# Patient Record
Sex: MALE | Race: BLACK OR AFRICAN AMERICAN | NOT HISPANIC OR LATINO | Employment: FULL TIME | URBAN - METROPOLITAN AREA
[De-identification: names, ages, dates, MRNs, and addresses within clinical notes are randomized per-mention and may not be internally consistent; named-entity substitution may affect disease eponyms.]

---

## 2017-01-03 ENCOUNTER — ALLSCRIPTS OFFICE VISIT (OUTPATIENT)
Dept: OTHER | Facility: OTHER | Age: 52
End: 2017-01-03

## 2017-02-10 ENCOUNTER — TRANSCRIBE ORDERS (OUTPATIENT)
Dept: URGENT CARE | Age: 52
End: 2017-02-10

## 2017-02-10 ENCOUNTER — HOSPITAL ENCOUNTER (OUTPATIENT)
Dept: RADIOLOGY | Age: 52
Discharge: HOME | End: 2017-02-10
Admitting: FAMILY MEDICINE
Payer: COMMERCIAL

## 2017-02-10 ENCOUNTER — OFFICE VISIT (OUTPATIENT)
Dept: URGENT CARE | Age: 52
End: 2017-02-10
Payer: COMMERCIAL

## 2017-02-10 DIAGNOSIS — M25.512 PAIN IN LEFT SHOULDER: ICD-10-CM

## 2017-02-10 PROCEDURE — 73030 X-RAY EXAM OF SHOULDER: CPT

## 2017-02-10 PROCEDURE — G0382 LEV 3 HOSP TYPE B ED VISIT: HCPCS | Performed by: FAMILY MEDICINE

## 2017-06-13 ENCOUNTER — ALLSCRIPTS OFFICE VISIT (OUTPATIENT)
Dept: OTHER | Facility: OTHER | Age: 52
End: 2017-06-13

## 2018-01-06 ENCOUNTER — OFFICE VISIT (OUTPATIENT)
Dept: URGENT CARE | Age: 53
End: 2018-01-06
Payer: COMMERCIAL

## 2018-01-06 PROCEDURE — 99213 OFFICE O/P EST LOW 20 MIN: CPT | Performed by: FAMILY MEDICINE

## 2018-01-06 PROCEDURE — 69209 REMOVE IMPACTED EAR WAX UNI: CPT

## 2018-01-14 VITALS
BODY MASS INDEX: 27.74 KG/M2 | WEIGHT: 183 LBS | SYSTOLIC BLOOD PRESSURE: 122 MMHG | HEART RATE: 82 BPM | DIASTOLIC BLOOD PRESSURE: 78 MMHG | RESPIRATION RATE: 16 BRPM | TEMPERATURE: 95.6 F | HEIGHT: 68 IN

## 2018-01-14 VITALS
HEIGHT: 69 IN | HEART RATE: 74 BPM | TEMPERATURE: 97 F | DIASTOLIC BLOOD PRESSURE: 80 MMHG | WEIGHT: 179 LBS | RESPIRATION RATE: 16 BRPM | SYSTOLIC BLOOD PRESSURE: 140 MMHG | BODY MASS INDEX: 26.51 KG/M2

## 2018-01-15 NOTE — RESULT NOTES
Message   pt needs appt for full physical, labs are back, PSA is elevated     Verified Results  (1) CBC/PLT/DIFF 01Apr2016 07:04AM Hue Zhou     Test Name Result Flag Reference   WBC 4 0 x10E3/uL  3 4-10 8   RBC 4 97 x10E6/uL  4 14-5 80   Hemoglobin 15 1 g/dL  12 6-17 7   Hematocrit 43 8 %  37 5-51 0   MCV 88 fL  79-97   MCH 30 4 pg  26 6-33 0   MCHC 34 5 g/dL  31 5-35 7   RDW 13 1 %  12 3-15 4   Platelets 190 Q54C0/XX  150-379   Neutrophils 32 %     Lymphs 57 %     Monocytes 6 %     Eos 4 %     Basos 1 %     Neutrophils (Absolute) 1 3 x10E3/uL L 1 4-7 0   Lymphs (Absolute) 2 3 x10E3/uL  0 7-3 1   Monocytes(Absolute) 0 2 x10E3/uL  0 1-0 9   Eos (Absolute) 0 2 x10E3/uL  0 0-0 4   Baso (Absolute) 0 0 x10E3/uL  0 0-0 2   Immature Granulocytes 0 %     Immature Grans (Abs) 0 0 x10E3/uL  0 0-0 1     (1) COMPREHENSIVE METABOLIC PANEL [id number] 40:91MN [employer]     Test Name Result Flag Reference   Glucose, Serum 98 mg/dL  65-99   BUN 12 mg/dL  6-24   Creatinine, Serum 1 08 mg/dL  0 76-1 27   eGFR If NonAfricn Am 79 mL/min/1 73  >59   eGFR If Africn Am 91 mL/min/1 73  >59   BUN/Creatinine Ratio 11  9-20   Sodium, Serum 139 mmol/L  134-144   Potassium, Serum 4 1 mmol/L  3 5-5 2   Chloride, Serum 99 mmol/L     Carbon Dioxide, Total 24 mmol/L  18-29   Calcium, Serum 9 3 mg/dL  8 7-10 2   Protein, Total, Serum 6 9 g/dL  6 0-8 5   Albumin, Serum 4 4 g/dL  3 5-5 5   Globulin, Total 2 5 g/dL  1 5-4 5   A/G Ratio 1 8  1 1-2 5   Bilirubin, Total 0 8 mg/dL  0 0-1 2   Alkaline Phosphatase, S 42 IU/L     AST (SGOT) 27 IU/L  0-40   ALT (SGPT) 30 IU/L  0-44     (1) LIPID PANEL, FASTING 01Apr2016 07:04AM Hue Epperson     Test Name Result Flag Reference   Cholesterol, Total 196 mg/dL  100-199   Triglycerides 142 mg/dL  0-149   HDL Cholesterol 45 mg/dL  >39   According to ATP-III Guidelines, HDL-C >59 mg/dL is considered a  negative risk factor for CHD     VLDL Cholesterol Sal 28 mg/dL  5-40   LDL Cholesterol Calc 123 mg/dL H 0-99   T  Chol/HDL Ratio 4 4 ratio units  0 0-5 0   T  Chol/HDL Ratio                                                             Men  Women                                               1/2 Avg  Risk  3 4    3 3                                                   Avg Risk  5 0    4 4                                                2X Avg  Risk  9 6    7 1                                                3X Avg  Risk 23 4   11 0     (1) PSA (SCREEN) (Dx V76 44 Screen for Prostate Cancer) 01Apr2016 07:04AM Lanette Wheeler     Test Name Result Flag Reference   Prostate Specific Ag, Serum 4 2 ng/mL H 0 0-4 0   Roche ECLIA methodology  According to the American Urological Association, Serum PSA should  decrease and remain at undetectable levels after radical  prostatectomy  The AUA defines biochemical recurrence as an initial  PSA value 0 2 ng/mL or greater followed by a subsequent confirmatory  PSA value 0 2 ng/mL or greater  Values obtained with different assay methods or kits cannot be used  interchangeably  Results cannot be interpreted as absolute evidence  of the presence or absence of malignant disease  (1) TSH 01Apr2016 07:04AM Lanette Wheeler     Test Name Result Flag Reference   TSH 1 380 uIU/mL  0 450-4 500     [facility]) Cardiovascular Risk Assessment 01Apr2016 07:04AM Silvino Mcnamara courtesy copy of this report has been sent to  the patient  Test Name Result Flag Reference   Interpretation Note     Supplement report is available  PDF Image

## 2018-01-23 VITALS
OXYGEN SATURATION: 100 % | BODY MASS INDEX: 27.28 KG/M2 | HEART RATE: 61 BPM | WEIGHT: 180 LBS | RESPIRATION RATE: 18 BRPM | HEIGHT: 68 IN | DIASTOLIC BLOOD PRESSURE: 75 MMHG | SYSTOLIC BLOOD PRESSURE: 134 MMHG | TEMPERATURE: 97.3 F
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
## 2018-05-14 ENCOUNTER — OFFICE VISIT (OUTPATIENT)
Dept: FAMILY MEDICINE CLINIC | Facility: CLINIC | Age: 53
End: 2018-05-14
Payer: COMMERCIAL

## 2018-05-14 VITALS
HEIGHT: 68 IN | BODY MASS INDEX: 27.58 KG/M2 | SYSTOLIC BLOOD PRESSURE: 116 MMHG | DIASTOLIC BLOOD PRESSURE: 74 MMHG | HEART RATE: 72 BPM | WEIGHT: 182 LBS | RESPIRATION RATE: 16 BRPM | TEMPERATURE: 95.9 F

## 2018-05-14 DIAGNOSIS — Z12.5 SCREENING FOR PROSTATE CANCER: ICD-10-CM

## 2018-05-14 DIAGNOSIS — Z13.6 SCREENING FOR CARDIOVASCULAR CONDITION: ICD-10-CM

## 2018-05-14 DIAGNOSIS — J02.8 PHARYNGITIS DUE TO OTHER ORGANISM: Primary | ICD-10-CM

## 2018-05-14 DIAGNOSIS — Z12.11 SCREENING FOR COLON CANCER: ICD-10-CM

## 2018-05-14 DIAGNOSIS — Z12.11 SCREEN FOR COLON CANCER: ICD-10-CM

## 2018-05-14 PROCEDURE — 99213 OFFICE O/P EST LOW 20 MIN: CPT | Performed by: FAMILY MEDICINE

## 2018-05-14 PROCEDURE — 3008F BODY MASS INDEX DOCD: CPT | Performed by: FAMILY MEDICINE

## 2018-05-14 PROCEDURE — 1036F TOBACCO NON-USER: CPT | Performed by: FAMILY MEDICINE

## 2018-05-14 RX ORDER — AZITHROMYCIN 250 MG/1
TABLET, FILM COATED ORAL
Qty: 6 TABLET | Refills: 0 | Status: SHIPPED | OUTPATIENT
Start: 2018-05-14 | End: 2018-05-19

## 2018-05-14 RX ORDER — ALPRAZOLAM 0.25 MG/1
TABLET ORAL
Refills: 0 | COMMUNITY
Start: 2018-05-05 | End: 2018-05-14 | Stop reason: ALTCHOICE

## 2018-05-14 NOTE — PROGRESS NOTES
Assessment/Plan:    Problem List Items Addressed This Visit     None      Visit Diagnoses     Pharyngitis due to other organism    -  Primary    Relevant Medications    azithromycin (ZITHROMAX) 250 mg tablet    Screening for cardiovascular condition        Relevant Orders    Comprehensive metabolic panel    Lipid Panel with Direct LDL reflex    Screening for prostate cancer        Relevant Orders    PSA, ultrasensitive    Screen for colon cancer        Relevant Orders    Ambulatory referral to Gastroenterology    Screening for colon cancer        Relevant Orders    Ambulatory referral to Gastroenterology          There are no Patient Instructions on file for this visit  Return for Annual physical     Subjective:      Patient ID: Deena Paulino is a 47 y o  male  Chief Complaint   Patient presents with    Cold Like Symptoms     lj    Cough    Sore Throat       Pt states he has a sore throat some HA  Pt states his left ear hurts states three days ago he started using an abx he was given by an ent in June - its a drop and he used it for three days /  Has helped    Pt states he would like to have labs for a follow up      Cough   Associated symptoms include chills, ear congestion, ear pain, headaches, myalgias, nasal congestion, postnasal drip and a sore throat  Pertinent negatives include no chest pain, fever, heartburn, hemoptysis, rash, rhinorrhea, shortness of breath, sweats, weight loss or wheezing  Sore Throat    Associated symptoms include coughing, ear pain and headaches  Pertinent negatives include no shortness of breath  The following portions of the patient's history were reviewed and updated as appropriate: allergies, current medications, past family history, past medical history, past social history, past surgical history and problem list     Review of Systems   Constitutional: Positive for chills  Negative for fever and weight loss     HENT: Positive for ear pain, postnasal drip and sore throat  Negative for rhinorrhea  Respiratory: Positive for cough  Negative for hemoptysis, shortness of breath and wheezing  Cardiovascular: Negative for chest pain  Gastrointestinal: Negative for heartburn  Musculoskeletal: Positive for myalgias  Skin: Negative for rash  Neurological: Positive for headaches  Current Outpatient Prescriptions   Medication Sig Dispense Refill    azithromycin (ZITHROMAX) 250 mg tablet 2 tabs on day 1, 1 tab a day for 4 days after 6 tablet 0     No current facility-administered medications for this visit  Objective:    /74   Pulse 72   Temp (!) 95 9 °F (35 5 °C)   Resp 16   Ht 5' 8" (1 727 m)   Wt 82 6 kg (182 lb)   BMI 27 67 kg/m²        Physical Exam   Constitutional: He appears well-developed and well-nourished  HENT:   Head: Normocephalic and atraumatic  Right Ear: Tympanic membrane is erythematous and bulging  Left Ear: Tympanic membrane is not bulging  Nose: Mucosal edema present  Mouth/Throat: Posterior oropharyngeal erythema present  Eyes: Conjunctivae and EOM are normal  Pupils are equal, round, and reactive to light  Neck: Normal range of motion  Cardiovascular: Normal rate, regular rhythm and intact distal pulses  No murmur heard  Pulmonary/Chest: Effort normal and breath sounds normal    Abdominal: Soft  Musculoskeletal: Normal range of motion  Lymphadenopathy:     He has no cervical adenopathy  Neurological: He is alert  Skin: He is not diaphoretic  Nursing note and vitals reviewed        Labs ordered for full physical       Isabela Ayers DO

## 2018-05-14 NOTE — LETTER
May 14, 2018     Patient: Isela Olivarez   YOB: 1965   Date of Visit: 5/14/2018       To Whom it May Concern:    Isela Olivarez is under my professional care  He was seen in my office on 5/14/2018  He may return to work on 5/15  If you have any questions or concerns, please don't hesitate to call           Sincerely,          Michaela Estes DO        CC: No Recipients

## 2018-05-21 LAB
ALBUMIN SERPL-MCNC: 4.7 G/DL (ref 3.5–5.5)
ALBUMIN/GLOB SERPL: 2 {RATIO} (ref 1.2–2.2)
ALP SERPL-CCNC: 44 IU/L (ref 39–117)
ALT SERPL-CCNC: 14 IU/L (ref 0–44)
AMBIG ABBREV DEFAULT: NORMAL
AST SERPL-CCNC: 17 IU/L (ref 0–40)
BILIRUB SERPL-MCNC: 1.1 MG/DL (ref 0–1.2)
BUN SERPL-MCNC: 14 MG/DL (ref 6–24)
BUN/CREAT SERPL: 12 (ref 9–20)
CALCIUM SERPL-MCNC: 9.5 MG/DL (ref 8.7–10.2)
CHLORIDE SERPL-SCNC: 101 MMOL/L (ref 96–106)
CHOLEST SERPL-MCNC: 187 MG/DL (ref 100–199)
CO2 SERPL-SCNC: 27 MMOL/L (ref 18–29)
CREAT SERPL-MCNC: 1.21 MG/DL (ref 0.76–1.27)
GLOBULIN SER-MCNC: 2.3 G/DL (ref 1.5–4.5)
GLUCOSE SERPL-MCNC: 104 MG/DL (ref 65–99)
HDLC SERPL-MCNC: 53 MG/DL
LDLC SERPL CALC-MCNC: 117 MG/DL (ref 0–99)
MICRODELETION SYND BLD/T FISH: NORMAL
POTASSIUM SERPL-SCNC: 4.4 MMOL/L (ref 3.5–5.2)
PROT SERPL-MCNC: 7 G/DL (ref 6–8.5)
PSA SERPL DL<=0.01 NG/ML-MCNC: 5.17 NG/ML (ref 0–4)
SL AMB EGFR AFRICAN AMERICAN: 79 ML/MIN/1.73
SL AMB EGFR NON AFRICAN AMERICAN: 68 ML/MIN/1.73
SODIUM SERPL-SCNC: 142 MMOL/L (ref 134–144)
TRIGL SERPL-MCNC: 84 MG/DL (ref 0–149)

## 2019-05-28 ENCOUNTER — OFFICE VISIT (OUTPATIENT)
Dept: FAMILY MEDICINE CLINIC | Facility: CLINIC | Age: 54
End: 2019-05-28
Payer: COMMERCIAL

## 2019-05-28 VITALS
WEIGHT: 189 LBS | HEART RATE: 64 BPM | SYSTOLIC BLOOD PRESSURE: 128 MMHG | RESPIRATION RATE: 16 BRPM | TEMPERATURE: 96.9 F | BODY MASS INDEX: 27.99 KG/M2 | DIASTOLIC BLOOD PRESSURE: 70 MMHG | HEIGHT: 69 IN

## 2019-05-28 DIAGNOSIS — Z13.6 SCREENING FOR CARDIOVASCULAR CONDITION: ICD-10-CM

## 2019-05-28 DIAGNOSIS — J30.9 ALLERGIC RHINITIS, UNSPECIFIED SEASONALITY, UNSPECIFIED TRIGGER: ICD-10-CM

## 2019-05-28 DIAGNOSIS — M25.512 CHRONIC LEFT SHOULDER PAIN: Primary | ICD-10-CM

## 2019-05-28 DIAGNOSIS — Z12.5 PROSTATE CANCER SCREENING: ICD-10-CM

## 2019-05-28 DIAGNOSIS — G89.29 CHRONIC LEFT SHOULDER PAIN: Primary | ICD-10-CM

## 2019-05-28 PROCEDURE — 99214 OFFICE O/P EST MOD 30 MIN: CPT | Performed by: NURSE PRACTITIONER

## 2019-05-28 RX ORDER — PREDNISONE 10 MG/1
TABLET ORAL
Qty: 20 TABLET | Refills: 0 | Status: SHIPPED | OUTPATIENT
Start: 2019-05-28 | End: 2019-06-07

## 2019-05-29 ENCOUNTER — APPOINTMENT (OUTPATIENT)
Dept: RADIOLOGY | Facility: CLINIC | Age: 54
End: 2019-05-29
Payer: COMMERCIAL

## 2019-05-29 ENCOUNTER — CONSULT (OUTPATIENT)
Dept: OBGYN CLINIC | Facility: CLINIC | Age: 54
End: 2019-05-29
Payer: COMMERCIAL

## 2019-05-29 VITALS
HEART RATE: 90 BPM | DIASTOLIC BLOOD PRESSURE: 80 MMHG | SYSTOLIC BLOOD PRESSURE: 133 MMHG | HEIGHT: 68 IN | BODY MASS INDEX: 28.04 KG/M2 | WEIGHT: 185 LBS

## 2019-05-29 DIAGNOSIS — M25.512 CHRONIC LEFT SHOULDER PAIN: ICD-10-CM

## 2019-05-29 DIAGNOSIS — G89.29 CHRONIC LEFT SHOULDER PAIN: ICD-10-CM

## 2019-05-29 PROCEDURE — 99203 OFFICE O/P NEW LOW 30 MIN: CPT | Performed by: ORTHOPAEDIC SURGERY

## 2019-05-29 PROCEDURE — 73030 X-RAY EXAM OF SHOULDER: CPT

## 2019-06-17 ENCOUNTER — HOSPITAL ENCOUNTER (OUTPATIENT)
Dept: RADIOLOGY | Facility: HOSPITAL | Age: 54
Discharge: HOME | End: 2019-06-17
Attending: ORTHOPAEDIC SURGERY
Payer: COMMERCIAL

## 2019-06-17 DIAGNOSIS — M25.512 CHRONIC LEFT SHOULDER PAIN: ICD-10-CM

## 2019-06-17 DIAGNOSIS — G89.29 CHRONIC LEFT SHOULDER PAIN: ICD-10-CM

## 2019-06-17 PROCEDURE — 73221 MRI JOINT UPR EXTREM W/O DYE: CPT

## 2019-06-21 ENCOUNTER — TELEPHONE (OUTPATIENT)
Dept: FAMILY MEDICINE CLINIC | Facility: CLINIC | Age: 54
End: 2019-06-21

## 2019-06-21 DIAGNOSIS — M75.102 TEAR OF LEFT SUPRASPINATUS TENDON: Primary | ICD-10-CM

## 2019-06-21 LAB
ALBUMIN SERPL-MCNC: 4.5 G/DL (ref 3.5–5.5)
ALBUMIN/GLOB SERPL: 1.9 {RATIO} (ref 1.2–2.2)
ALP SERPL-CCNC: 39 IU/L (ref 39–117)
ALT SERPL-CCNC: 13 IU/L (ref 0–44)
AST SERPL-CCNC: 16 IU/L (ref 0–40)
BILIRUB SERPL-MCNC: 0.9 MG/DL (ref 0–1.2)
BUN SERPL-MCNC: 15 MG/DL (ref 6–24)
BUN/CREAT SERPL: 12 (ref 9–20)
CALCIUM SERPL-MCNC: 9.4 MG/DL (ref 8.7–10.2)
CHLORIDE SERPL-SCNC: 102 MMOL/L (ref 96–106)
CHOLEST SERPL-MCNC: 196 MG/DL (ref 100–199)
CO2 SERPL-SCNC: 22 MMOL/L (ref 20–29)
CREAT SERPL-MCNC: 1.25 MG/DL (ref 0.76–1.27)
GLOBULIN SER-MCNC: 2.4 G/DL (ref 1.5–4.5)
GLUCOSE SERPL-MCNC: 98 MG/DL (ref 65–99)
HDLC SERPL-MCNC: 52 MG/DL
LABCORP COMMENT: NORMAL
LDLC SERPL CALC-MCNC: 129 MG/DL (ref 0–99)
MICRODELETION SYND BLD/T FISH: NORMAL
POTASSIUM SERPL-SCNC: 4 MMOL/L (ref 3.5–5.2)
PROT SERPL-MCNC: 6.9 G/DL (ref 6–8.5)
PSA SERPL-MCNC: 6.3 NG/ML (ref 0–4)
SL AMB EGFR AFRICAN AMERICAN: 75 ML/MIN/1.73
SL AMB EGFR NON AFRICAN AMERICAN: 65 ML/MIN/1.73
SODIUM SERPL-SCNC: 140 MMOL/L (ref 134–144)
TRIGL SERPL-MCNC: 75 MG/DL (ref 0–149)

## 2019-06-21 NOTE — TELEPHONE ENCOUNTER
Patient is asking if he can come August 7 for a physical he will be out of town before then  He stated he will be available next week Monday, however you are booked solid  [employer] told me that his PSA is abnormal please advise    Jacky Epley, MA

## 2019-06-21 NOTE — TELEPHONE ENCOUNTER
August 6th which is tuesday he will be coming up from [place]  It this the Tuesday you are referring to ?

## 2019-06-21 NOTE — TELEPHONE ENCOUNTER
Please call  Patient and let him know that to schedule physical  appt with dr Bina Deo to discuss his blood work as advised him on his appt   RAJENDRA Saini

## 2019-06-24 ENCOUNTER — OFFICE VISIT (OUTPATIENT)
Dept: OBGYN CLINIC | Facility: CLINIC | Age: 54
End: 2019-06-24
Payer: COMMERCIAL

## 2019-06-24 VITALS
SYSTOLIC BLOOD PRESSURE: 119 MMHG | DIASTOLIC BLOOD PRESSURE: 74 MMHG | HEART RATE: 57 BPM | BODY MASS INDEX: 27.28 KG/M2 | WEIGHT: 180 LBS | HEIGHT: 68 IN

## 2019-06-24 DIAGNOSIS — M75.102 TEAR OF LEFT SUPRASPINATUS TENDON: Primary | ICD-10-CM

## 2019-06-24 PROCEDURE — 99213 OFFICE O/P EST LOW 20 MIN: CPT | Performed by: ORTHOPAEDIC SURGERY

## 2019-06-24 PROCEDURE — 20610 DRAIN/INJ JOINT/BURSA W/O US: CPT | Performed by: ORTHOPAEDIC SURGERY

## 2019-06-24 RX ORDER — LIDOCAINE HYDROCHLORIDE 10 MG/ML
4 INJECTION, SOLUTION INFILTRATION; PERINEURAL
Status: COMPLETED | OUTPATIENT
Start: 2019-06-24 | End: 2019-06-24

## 2019-06-24 RX ORDER — DEXAMETHASONE SODIUM PHOSPHATE 100 MG/10ML
40 INJECTION INTRAMUSCULAR; INTRAVENOUS
Status: COMPLETED | OUTPATIENT
Start: 2019-06-24 | End: 2019-06-24

## 2019-06-24 RX ADMIN — LIDOCAINE HYDROCHLORIDE 4 ML: 10 INJECTION, SOLUTION INFILTRATION; PERINEURAL at 16:11

## 2019-06-24 RX ADMIN — DEXAMETHASONE SODIUM PHOSPHATE 40 MG: 100 INJECTION INTRAMUSCULAR; INTRAVENOUS at 16:11

## 2019-08-04 ENCOUNTER — APPOINTMENT (EMERGENCY)
Dept: RADIOLOGY | Facility: HOSPITAL | Age: 54
End: 2019-08-04
Payer: COMMERCIAL

## 2019-08-04 ENCOUNTER — HOSPITAL ENCOUNTER (EMERGENCY)
Facility: HOSPITAL | Age: 54
Discharge: HOME | End: 2019-08-04
Attending: EMERGENCY MEDICINE | Admitting: EMERGENCY MEDICINE
Payer: COMMERCIAL

## 2019-08-04 DIAGNOSIS — R50.9 FEVER: Primary | ICD-10-CM

## 2019-08-04 DIAGNOSIS — M79.10 MYALGIA: ICD-10-CM

## 2019-08-04 LAB
ALBUMIN SERPL BCP-MCNC: 4.1 G/DL (ref 3.5–5)
ALP SERPL-CCNC: 66 U/L (ref 46–116)
ALT SERPL W P-5'-P-CCNC: 28 U/L (ref 12–78)
ANION GAP SERPL CALCULATED.3IONS-SCNC: 9 MMOL/L (ref 4–13)
APTT PPP: 30 SECONDS (ref 23–37)
AST SERPL W P-5'-P-CCNC: 15 U/L (ref 5–45)
BASOPHILS # BLD AUTO: 0.03 THOUSANDS/ΜL (ref 0–0.1)
BASOPHILS NFR BLD AUTO: 0 % (ref 0–1)
BILIRUB SERPL-MCNC: 0.9 MG/DL (ref 0.2–1)
BILIRUB UR QL STRIP: NEGATIVE
BUN SERPL-MCNC: 11 MG/DL (ref 5–25)
CALCIUM SERPL-MCNC: 9.3 MG/DL (ref 8.3–10.1)
CHLORIDE SERPL-SCNC: 104 MMOL/L (ref 100–108)
CK SERPL-CCNC: 131 U/L (ref 39–308)
CLARITY UR: CLEAR
CO2 SERPL-SCNC: 30 MMOL/L (ref 21–32)
COLOR UR: YELLOW
CREAT SERPL-MCNC: 1.32 MG/DL (ref 0.6–1.3)
EOSINOPHIL # BLD AUTO: 0.48 THOUSAND/ΜL (ref 0–0.61)
EOSINOPHIL NFR BLD AUTO: 6 % (ref 0–6)
ERYTHROCYTE [DISTWIDTH] IN BLOOD BY AUTOMATED COUNT: 11.7 % (ref 11.6–15.1)
GFR SERPL CREATININE-BSD FRML MDRD: 70 ML/MIN/1.73SQ M
GLUCOSE SERPL-MCNC: 94 MG/DL (ref 65–140)
GLUCOSE UR STRIP-MCNC: NEGATIVE MG/DL
HCT VFR BLD AUTO: 47.7 % (ref 36.5–49.3)
HGB BLD-MCNC: 16.2 G/DL (ref 12–17)
HGB UR QL STRIP.AUTO: NEGATIVE
IMM GRANULOCYTES # BLD AUTO: 0.02 THOUSAND/UL (ref 0–0.2)
IMM GRANULOCYTES NFR BLD AUTO: 0 % (ref 0–2)
INR PPP: 1.05 (ref 0.84–1.19)
KETONES UR STRIP-MCNC: NEGATIVE MG/DL
LACTATE SERPL-SCNC: 1.4 MMOL/L (ref 0.5–2)
LEUKOCYTE ESTERASE UR QL STRIP: NEGATIVE
LYMPHOCYTES # BLD AUTO: 0.57 THOUSANDS/ΜL (ref 0.6–4.47)
LYMPHOCYTES NFR BLD AUTO: 7 % (ref 14–44)
MAGNESIUM SERPL-MCNC: 2.1 MG/DL (ref 1.6–2.6)
MCH RBC QN AUTO: 30.7 PG (ref 26.8–34.3)
MCHC RBC AUTO-ENTMCNC: 34 G/DL (ref 31.4–37.4)
MCV RBC AUTO: 90 FL (ref 82–98)
MONOCYTES # BLD AUTO: 0.49 THOUSAND/ΜL (ref 0.17–1.22)
MONOCYTES NFR BLD AUTO: 6 % (ref 4–12)
NEUTROPHILS # BLD AUTO: 6.42 THOUSANDS/ΜL (ref 1.85–7.62)
NEUTS SEG NFR BLD AUTO: 81 % (ref 43–75)
NITRITE UR QL STRIP: NEGATIVE
NRBC BLD AUTO-RTO: 0 /100 WBCS
PH UR STRIP.AUTO: 6 [PH] (ref 4.5–8)
PLATELET # BLD AUTO: 271 THOUSANDS/UL (ref 149–390)
PMV BLD AUTO: 9.3 FL (ref 8.9–12.7)
POTASSIUM SERPL-SCNC: 3.9 MMOL/L (ref 3.5–5.3)
PROT SERPL-MCNC: 7.8 G/DL (ref 6.4–8.2)
PROT UR STRIP-MCNC: NEGATIVE MG/DL
PROTHROMBIN TIME: 13.1 SECONDS (ref 11.6–14.5)
RBC # BLD AUTO: 5.28 MILLION/UL (ref 3.88–5.62)
SODIUM SERPL-SCNC: 143 MMOL/L (ref 136–145)
SP GR UR STRIP.AUTO: <=1.005 (ref 1–1.03)
TSH SERPL DL<=0.05 MIU/L-ACNC: 0.62 UIU/ML (ref 0.36–3.74)
UROBILINOGEN UR QL STRIP.AUTO: 0.2 E.U./DL
WBC # BLD AUTO: 8.01 THOUSAND/UL (ref 4.31–10.16)

## 2019-08-04 PROCEDURE — 86618 LYME DISEASE ANTIBODY: CPT | Performed by: EMERGENCY MEDICINE

## 2019-08-04 PROCEDURE — 84443 ASSAY THYROID STIM HORMONE: CPT | Performed by: EMERGENCY MEDICINE

## 2019-08-04 PROCEDURE — 83605 ASSAY OF LACTIC ACID: CPT | Performed by: EMERGENCY MEDICINE

## 2019-08-04 PROCEDURE — 85610 PROTHROMBIN TIME: CPT | Performed by: EMERGENCY MEDICINE

## 2019-08-04 PROCEDURE — 36415 COLL VENOUS BLD VENIPUNCTURE: CPT | Performed by: EMERGENCY MEDICINE

## 2019-08-04 PROCEDURE — 87177 OVA AND PARASITES SMEARS: CPT | Performed by: EMERGENCY MEDICINE

## 2019-08-04 PROCEDURE — 85730 THROMBOPLASTIN TIME PARTIAL: CPT | Performed by: EMERGENCY MEDICINE

## 2019-08-04 PROCEDURE — 82550 ASSAY OF CK (CPK): CPT | Performed by: EMERGENCY MEDICINE

## 2019-08-04 PROCEDURE — 83735 ASSAY OF MAGNESIUM: CPT | Performed by: EMERGENCY MEDICINE

## 2019-08-04 PROCEDURE — 96361 HYDRATE IV INFUSION ADD-ON: CPT

## 2019-08-04 PROCEDURE — 87207 SMEAR SPECIAL STAIN: CPT | Performed by: EMERGENCY MEDICINE

## 2019-08-04 PROCEDURE — 85025 COMPLETE CBC W/AUTO DIFF WBC: CPT | Performed by: EMERGENCY MEDICINE

## 2019-08-04 PROCEDURE — 80053 COMPREHEN METABOLIC PANEL: CPT | Performed by: EMERGENCY MEDICINE

## 2019-08-04 PROCEDURE — 87505 NFCT AGENT DETECTION GI: CPT | Performed by: EMERGENCY MEDICINE

## 2019-08-04 PROCEDURE — 96375 TX/PRO/DX INJ NEW DRUG ADDON: CPT

## 2019-08-04 PROCEDURE — 81003 URINALYSIS AUTO W/O SCOPE: CPT

## 2019-08-04 PROCEDURE — 71046 X-RAY EXAM CHEST 2 VIEWS: CPT

## 2019-08-04 PROCEDURE — 87209 SMEAR COMPLEX STAIN: CPT | Performed by: EMERGENCY MEDICINE

## 2019-08-04 PROCEDURE — 96365 THER/PROPH/DIAG IV INF INIT: CPT

## 2019-08-04 PROCEDURE — 99284 EMERGENCY DEPT VISIT MOD MDM: CPT

## 2019-08-04 PROCEDURE — 87040 BLOOD CULTURE FOR BACTERIA: CPT | Performed by: EMERGENCY MEDICINE

## 2019-08-04 PROCEDURE — 99284 EMERGENCY DEPT VISIT MOD MDM: CPT | Performed by: EMERGENCY MEDICINE

## 2019-08-04 RX ORDER — DOXYCYCLINE HYCLATE 100 MG/1
100 CAPSULE ORAL 2 TIMES DAILY
Qty: 14 CAPSULE | Refills: 0 | Status: SHIPPED | OUTPATIENT
Start: 2019-08-04 | End: 2019-08-11

## 2019-08-04 RX ORDER — ACETAMINOPHEN 325 MG/1
975 TABLET ORAL ONCE
Status: COMPLETED | OUTPATIENT
Start: 2019-08-04 | End: 2019-08-04

## 2019-08-04 RX ORDER — KETOROLAC TROMETHAMINE 30 MG/ML
15 INJECTION, SOLUTION INTRAMUSCULAR; INTRAVENOUS ONCE
Status: COMPLETED | OUTPATIENT
Start: 2019-08-04 | End: 2019-08-04

## 2019-08-04 RX ADMIN — DOXYCYCLINE 100 MG: 100 INJECTION, POWDER, LYOPHILIZED, FOR SOLUTION INTRAVENOUS at 21:01

## 2019-08-04 RX ADMIN — SODIUM CHLORIDE 1000 ML: 0.9 INJECTION, SOLUTION INTRAVENOUS at 19:13

## 2019-08-04 RX ADMIN — ACETAMINOPHEN 975 MG: 325 TABLET ORAL at 19:45

## 2019-08-04 RX ADMIN — KETOROLAC TROMETHAMINE 15 MG: 30 INJECTION, SOLUTION INTRAMUSCULAR at 19:13

## 2019-08-04 NOTE — ED PROVIDER NOTES
History  Chief Complaint   Patient presents with    Generalized Body Aches     Pt  c/o generalized body aches with diarrhea for two days  Pt  visited [place] ([place] two weeks ago and was dx with malaria  Pt  treated there before coming home  History provided by:  Patient   used: No    Generalized Body Aches   Associated symptoms: fatigue, fever and myalgias    Associated symptoms: no abdominal pain, no nausea, no shortness of breath and no vomiting     46year-old otherwise healthy male who returned from a three-week trip to [place] ([place]) 2 days ago presented with generalized body and joint aches as well as some diarrhea  States that he started feeling unwell initially 5 days into his trip with similar sx  Reports having a history of malaria, grew up in [facility] ([place])  Reports that he saw care there and was diagnosed with malaria and treated with a 3 day course of a medicine he believes was called aplax  He had been taking mefloquine once weekly as prophylaxis  Stated that after he was treated he felt better until he returned to the [place]  He does have a low-grade temperature here appears nontoxic  He states all of his joints and muscles ache  He has not taken any medication prior to arrival   No difficulty breathing, abdominal pain, nausea, vomiting, headache, neck pain  Plan labs, fluids, analgesics, re-evaluate  None       Past Medical History:   Diagnosis Date    Dermatitis     Resolved 3/31/2016     Dyshidrosis     Resolved 3/31/2016        Past Surgical History:   Procedure Laterality Date    SKIN BIOPSY         Family History   Problem Relation Age of Onset    Arthritis Mother     Deep vein thrombosis Mother     Prostate cancer Father    [facility] Other Son         Blurring vision  Vision problems due to auto-immune   Mental illness Neg Hx      I have reviewed and agree with the history as documented      Social History     Tobacco Use    Smoking status: Never Smoker    Smokeless tobacco: Never Used   Substance Use Topics    Alcohol use: Not on file    Drug use: Not on file        Review of Systems   Constitutional: Positive for activity change, fatigue and fever  Negative for appetite change  Respiratory: Negative for chest tightness and shortness of breath  Gastrointestinal: Negative for abdominal pain, nausea and vomiting  Musculoskeletal: Positive for arthralgias and myalgias  Skin: Negative for color change  Neurological: Negative for dizziness and weakness  All other systems reviewed and are negative  Physical Exam  Physical Exam   Constitutional: He is oriented to person, place, and time  He appears well-developed and well-nourished  No distress  HENT:   Head: Normocephalic  Mouth/Throat: Oropharynx is clear and moist    Cardiovascular: Normal rate and regular rhythm  Pulmonary/Chest: Effort normal    Abdominal: Soft  He exhibits no distension  There is no tenderness  Musculoskeletal: Normal range of motion  He exhibits no edema  Neurological: He is alert and oriented to person, place, and time  Skin: Skin is warm and dry  He is not diaphoretic  Psychiatric: He has a normal mood and affect  His behavior is normal    Nursing note and vitals reviewed        Vital Signs  ED Triage Vitals   Temperature Pulse Respirations Blood Pressure SpO2   08/04/19 1638 08/04/19 1638 08/04/19 1638 08/04/19 1638 08/04/19 1638   99 °F (37 2 °C) 103 20 (!) 171/80 97 %      Temp Source Heart Rate Source Patient Position - Orthostatic VS BP Location FiO2 (%)   08/04/19 1638 08/04/19 1913 08/04/19 1913 08/04/19 1913 --   Oral Monitor Sitting Left arm       Pain Score       08/04/19 1638       Worst Possible Pain           Vitals:    08/04/19 1638 08/04/19 1913 08/04/19 2108 08/04/19 2220   BP: (!) 171/80 142/67 132/69 141/61   Pulse: 103 84 92 83   Patient Position - Orthostatic VS:  Sitting Lying Lying         Visual Acuity      ED Medications  Medications   sodium chloride 0 9 % bolus 1,000 mL (0 mL Intravenous Stopped 8/4/19 2013)   ketorolac (TORADOL) injection 15 mg (15 mg Intravenous Given 8/4/19 1913)   acetaminophen (TYLENOL) tablet 975 mg (975 mg Oral Given 8/4/19 1945)   doxycycline (VIBRAMYCIN) 100 mg in sodium chloride 0 9 % 100 mL IVPB (0 mg Intravenous Stopped 8/4/19 2220)       Diagnostic Studies  Results Reviewed     Procedure Component Value Units Date/Time    Stool Enteric Bacterial Panel by PCR [[id number]]  (Normal) Collected:  08/04/19 2132    Lab Status:  Final result Specimen:  Stool from Rectum Updated:  08/05/19 1450     Salmonella sp PCR None Detected     Shigella sp/Enteroinvasive E  coli (EIEC) PCR None Detected     Campylobacter sp (jejuni and coli) PCR None Detected     Shiga toxin 1/Shiga toxin 2 genes PCR None Detected    Path Slide Review [[id number]] Collected:  08/04/19 1844    Lab Status:  Final result Specimen:  Blood from Arm, Left Updated:  08/05/19 0954     Path Review No blood parasites seen  Reviewed by Dr Irineo Riley  Blood Parasite smear [[id number]] Collected:  08/04/19 1844    Lab Status:  Final result Specimen:  Blood from Arm, Left Updated:  08/05/19 0954     % Parasitemia 0     Is Blood Parasite Present No    Ova and parasite examination [[id number]] Collected:  08/04/19 2132    Lab Status:   In process Specimen:  Stool from Rectum Updated:  08/04/19 2135    ED Urine Macroscopic [[id number]] Collected:  08/04/19 2137    Lab Status:  Final result Specimen:  Urine Updated:  08/04/19 2127     Color, UA Yellow     Clarity, UA Clear     pH, UA 6 0     Leukocytes, UA Negative     Nitrite, UA Negative     Protein, UA Negative mg/dl      Glucose, UA Negative mg/dl      Ketones, UA Negative mg/dl      Urobilinogen, UA 0 2 E U /dl      Bilirubin, UA Negative     Blood, UA Negative     Specific Gravity, UA <=1 005    Narrative:       CLINITEK RESULT    TSH [[id number]]  (Normal) Collected:  08/04/19 1844    Lab Status:  Final result Specimen:  Blood from Arm, Left Updated:  08/04/19 1958     TSH 3RD GENERATON 0 624 uIU/mL     Narrative:       Patients undergoing fluorescein dye angiography may retain small amounts of fluorescein in the body for 48-72 hours post procedure  Samples containing fluorescein can produce falsely depressed TSH values  If the patient had this procedure,a specimen should be resubmitted post fluorescein clearance  Magnesium [[id number]]  (Normal) Collected:  08/04/19 1844    Lab Status:  Final result Specimen:  Blood from Arm, Left Updated:  08/04/19 1958     Magnesium 2 1 mg/dL     CK Total with Reflex CKMB [[id number]]  (Normal) Collected:  08/04/19 1844    Lab Status:  Final result Specimen:  Blood from Arm, Left Updated:  08/04/19 1958     Total  U/L     yellow fever IGM and IGG - Miscellaneous Test [[id number]] Collected:  08/04/19 1944    Lab Status: In process Specimen:  Blood from Arm, Right Updated:  08/04/19 1950    Blood culture #2 [[id number]] Collected:  08/04/19 1943    Lab Status: In process Specimen:  Blood from Arm, Right Updated:  08/04/19 1949    Lactic acid, plasma [[id number]]  (Normal) Collected:  08/04/19 1844    Lab Status:  Final result Specimen:  Blood from Arm, Left Updated:  08/04/19 1936     LACTIC ACID 1 4 mmol/L     Narrative:       Result may be elevated if tourniquet was used during collection      Comprehensive metabolic panel [[id number]]  (Abnormal) Collected:  08/04/19 1844    Lab Status:  Final result Specimen:  Blood from Arm, Left Updated:  08/04/19 1933     Sodium 143 mmol/L      Potassium 3 9 mmol/L      Chloride 104 mmol/L      CO2 30 mmol/L      ANION GAP 9 mmol/L      BUN 11 mg/dL      Creatinine 1 32 mg/dL      Glucose 94 mg/dL      Calcium 9 3 mg/dL      AST 15 U/L      ALT 28 U/L      Alkaline Phosphatase 66 U/L      Total Protein 7 8 g/dL      Albumin 4 1 g/dL      Total Bilirubin 0 90 mg/dL      eGFR 70 ml/min/1 73sq m     Narrative:       [place] guidelines for Chronic Kidney Disease (CKD):     Stage 1 with normal or high GFR (GFR > 90 mL/min/1 73 square meters)    Stage 2 Mild CKD (GFR = 60-89 mL/min/1 73 square meters)    Stage 3A Moderate CKD (GFR = 45-59 mL/min/1 73 square meters)    Stage 3B Moderate CKD (GFR = 30-44 mL/min/1 73 square meters)    Stage 4 Severe CKD (GFR = 15-29 mL/min/1 73 square meters)    Stage 5 End Stage CKD (GFR <15 mL/min/1 73 square meters)  Note: GFR calculation is accurate only with a steady state creatinine    Protime-INR [[id number]]  (Normal) Collected:  08/04/19 1844    Lab Status:  Final result Specimen:  Blood from Arm, Left Updated:  08/04/19 1928     Protime 13 1 seconds      INR 1 05    APTT [[id number]]  (Normal) Collected:  08/04/19 1844    Lab Status:  Final result Specimen:  Blood from Arm, Left Updated:  08/04/19 1928     PTT 30 seconds     CBC and differential [[id number]]  (Abnormal) Collected:  08/04/19 1844    Lab Status:  Final result Specimen:  Blood from Arm, Left Updated:  08/04/19 1914     WBC 8 01 Thousand/uL      RBC 5 28 Million/uL      Hemoglobin 16 2 g/dL      Hematocrit 47 7 %      MCV 90 fL      MCH 30 7 pg      MCHC 34 0 g/dL      RDW 11 7 %      MPV 9 3 fL      Platelets 664 Thousands/uL      nRBC 0 /100 WBCs      Neutrophils Relative 81 %      Immat GRANS % 0 %      Lymphocytes Relative 7 %      Monocytes Relative 6 %      Eosinophils Relative 6 %      Basophils Relative 0 %      Neutrophils Absolute 6 42 Thousands/µL      Immature Grans Absolute 0 02 Thousand/uL      Lymphocytes Absolute 0 57 Thousands/µL      Monocytes Absolute 0 49 Thousand/µL      Eosinophils Absolute 0 48 Thousand/µL      Basophils Absolute 0 03 Thousands/µL     Blood culture #1 [[id number]] Collected:  08/04/19 1859    Lab Status: In process Specimen:  Blood Updated:  08/04/19 1859    Lyme Antibody Profile with reflex to [facility] [[id number]] Collected:  08/04/19 1844    Lab Status:   In process Specimen:  Blood from Arm, Left Updated: 08/04/19 1859                 XR chest 2 views   ED Interpretation by Donna Cantu MD (75/80 [id number])   Normal       Final Result by Joe Cali MD (08/05 [id number])      No acute cardiopulmonary disease  Workstation performed: [id number]                    Procedures  Procedures       ED Course                               MDM  Number of Diagnoses or Management Options  Fever: new and requires workup  Myalgia: new and requires workup  Diagnosis management comments: 58-year-old male returning from [facility] ([place]) presented with generalized body aches and low-grade fever  He was treated for malaria while in [facility] ([place])  Reports having symptomatic improvement and then worsening 2 days ago  Lab work thus far unremarkable  Peripheral blood smear, Lyme antibody, yellow fever antibody pending  Will begin treatment empirically with doxycycline for possible recurrent malaria  Amount and/or Complexity of Data Reviewed  Clinical lab tests: ordered and reviewed  Obtain history from someone other than the patient: yes    Patient Progress  Patient progress: improved      Disposition  Final diagnoses:   Fever   Myalgia     Time reflects when diagnosis was documented in both MDM as applicable and the Disposition within this note     Time User Action Codes Description Comment    8/4/2019  8:11 PM Isabell FERNANDEZ Add [R50 9] Fever     8/4/2019  8:11 PM Jimmy Yang Add [M79 10] Myalgia       ED Disposition     ED Disposition Condition Date/Time Comment    Discharge Stable Sun Aug 4, 2019  8:11 PM [facility] discharge to home/self care              Follow-up Information     Follow up With Specialties Details Why Contact Info Additional [address] Emergency Department Emergency Medicine  If symptoms worsen [address]  [phone] [facility] ED, [address], [place], [facility],  Family Medicine, Wound [address]  [phone]             Discharge Medication List as of 8/4/2019  9:21 PM      START taking these medications    Details   doxycycline hyclate (VIBRAMYCIN) 100 mg capsule Take 1 capsule (100 mg total) by mouth 2 (two) times a day for 7 days, Starting Sun 8/4/2019, Until Sun 8/11/2019, Print           No discharge procedures on file      ED Provider  Electronically Signed by           Samaria Lee MD  08/05/19 0719

## 2019-08-05 VITALS
RESPIRATION RATE: 16 BRPM | BODY MASS INDEX: 28.41 KG/M2 | WEIGHT: 181 LBS | SYSTOLIC BLOOD PRESSURE: 141 MMHG | HEIGHT: 67 IN | OXYGEN SATURATION: 97 % | TEMPERATURE: 98.4 F | HEART RATE: 83 BPM | DIASTOLIC BLOOD PRESSURE: 61 MMHG

## 2019-08-05 LAB
% PARASITEMIA: 0
CAMPYLOBACTER DNA SPEC NAA+PROBE: NORMAL
PARASITE BLD: NO
PATHOLOGIST INTERPRETATION: NORMAL
SALMONELLA DNA SPEC QL NAA+PROBE: NORMAL
SHIGA TOXIN STX GENE SPEC NAA+PROBE: NORMAL
SHIGELLA DNA SPEC QL NAA+PROBE: NORMAL

## 2019-08-06 LAB
B BURGDOR IGG SER IA-ACNC: 0.08
B BURGDOR IGM SER IA-ACNC: 0.23

## 2019-08-10 LAB
BACTERIA BLD CULT: NORMAL
BACTERIA BLD CULT: NORMAL

## 2019-08-12 LAB — O+P STL CONC: NORMAL

## 2019-08-23 ENCOUNTER — OFFICE VISIT (OUTPATIENT)
Dept: OBGYN CLINIC | Facility: CLINIC | Age: 54
End: 2019-08-23
Payer: COMMERCIAL

## 2019-08-23 VITALS
HEART RATE: 58 BPM | HEIGHT: 67 IN | WEIGHT: 186 LBS | BODY MASS INDEX: 29.19 KG/M2 | SYSTOLIC BLOOD PRESSURE: 113 MMHG | DIASTOLIC BLOOD PRESSURE: 70 MMHG

## 2019-08-23 DIAGNOSIS — M75.102 TEAR OF LEFT SUPRASPINATUS TENDON: Primary | ICD-10-CM

## 2019-08-23 PROCEDURE — 99214 OFFICE O/P EST MOD 30 MIN: CPT | Performed by: ORTHOPAEDIC SURGERY

## 2019-08-23 RX ORDER — ACETAMINOPHEN 325 MG/1
650 TABLET ORAL EVERY 6 HOURS PRN
COMMUNITY

## 2019-08-23 NOTE — PROGRESS NOTES
Assessment/Plan:  1  Tear of left supraspinatus tendon  Ambulatory referral to Physical Therapy    Comfort Arm Sling    [facility]ude w/Pad       Scribe Attestation    I,:   Usha Tyson am acting as a scribe while in the presence of the attending physician :        I,:   Kimberlyn Lyons MD personally performed the services described in this documentation    as scribed in my presence :              Lynne Hirsch upon examination and review the MRI of his left shoulder does demonstrate a high-grade partial-thickness tear of the supraspinatus tendon  He does demonstrate painful symptoms that correlate with the findings on the MRI such as a painful empty can test as well as a positive drop arm test   I did discuss with Didier conservative measures of treatment of more physical therapy as well as corticosteroid injections  However he did recently have a corticosteroid injection that did only provided him with 1 weeks worth of relief  Additionally I did note to him that should he undergo a corticosteroid injection he would be restricted from having a left shoulder arthroscopy with rotator cuff repair due to the increased risk of infection from the corticosteroid injection  I do recommend that he use Tylenol and ice to control his painful symptoms  He may also heat his shoulder in the morning  I did discuss the left shoulder arthroscopy with rotator cuff repair and subacromial decompression with him today and associated recovery and risks including but not limited to bleeding, infection, blood clots, nerve injury resulting weakness and pain, stiffness, failure of surgery, recurrent injury, and need for further surgery  Additionally I did note the Lynne Hirsch that he would be restricted from driving until he is progressed out of his postoperative sling    Lynne Hirsch did verbalize understanding to all treatment options put forth for him today and wished to provide signed consent for a left shoulder arthroscopy with rotator cuff repair subacromial decompression  He will be fit with a postoperative sling today by my DME fitter  He will meet with my surgical scheduler to set up a date and time as per my schedule  Additionally Hugo Contreras will meet with his employer in regards to appropriate timing that he may undergo the procedure and recover  I will see Hugo Contreras back on the date of his surgery  Subjective:   Jr Green is a 46 y o  male who presents to the office today for surgical evaluation of his left shoulder  He is referred to us today by Dr Litzy Orozco for surgical consultation  He has had ongoing pain into his left shoulder for many years  However recently it has progressed into an intermittent and moderate to severe sharp pain about the anterior lateral aspect of her shoulder  He notes that reaching overhead as well as at the side exacerbates painful symptoms  He notes that he did do physical therapy in the past unfortunately this did fail  He is also received corticosteroid injections with the latest being on 6/24/2019  He states the received approximately 1 week of relief from the corticosteroid injection  He was recently on a surgery trip to [facility] ([place]) and contracted malaria that was subsequently treated there as well as back here  He states that he is feeling much better  However he notes that the compensation that he is doing in regards to his painful left shoulder is causing postural issues that is resulted subsequent lower extremity discomfort  Hugo Contreras denies experiencing any numbness or tingling sensations into his left upper extremity  Review of Systems   Constitutional: Negative for chills, fever and unexpected weight change  HENT: Negative for hearing loss, nosebleeds and sore throat  Eyes: Negative for pain, redness and visual disturbance  Respiratory: Negative for cough, shortness of breath and wheezing  Cardiovascular: Negative for chest pain, palpitations and leg swelling     Gastrointestinal: Negative for abdominal pain, nausea and vomiting  Endocrine: Negative for polyphagia and polyuria  Genitourinary: Negative for dysuria and hematuria  Musculoskeletal: Positive for arthralgias and myalgias  See HPI   Skin: Negative for rash and wound  Neurological: Negative for dizziness, numbness and headaches  Psychiatric/Behavioral: Negative for decreased concentration and suicidal ideas  The patient is not nervous/anxious  Past Medical History:   Diagnosis Date    Dermatitis     Resolved 3/31/2016     Dyshidrosis     Resolved 3/31/2016        Past Surgical History:   Procedure Laterality Date    SKIN BIOPSY         Family History   Problem Relation Age of Onset    Arthritis Mother     Deep vein thrombosis Mother     Prostate cancer Father    [facility] Other Son         Blurring vision  Vision problems due to auto-immune   No Known Problems Sister     No Known Problems Brother     No Known Problems Maternal Aunt     No Known Problems Maternal Uncle     No Known Problems Paternal Aunt     No Known Problems Paternal Uncle     No Known Problems Maternal Grandmother     No Known Problems Maternal Grandfather     No Known Problems Paternal Grandmother     No Known Problems Paternal Grandfather     Mental illness Neg Hx        Social History     Occupational History    Not on file   Tobacco Use    Smoking status: Never Smoker    Smokeless tobacco: Never Used   Substance and Sexual Activity    Alcohol use: Not on file    Drug use: Not on file    Sexual activity: Not on file         Current Outpatient Medications:     acetaminophen (TYLENOL) 325 mg tablet, Take 650 mg by mouth every 6 (six) hours as needed for mild pain, Disp: , Rfl:     No Known Allergies    Objective:  Vitals:    08/23/19 1428   BP: 113/70   Pulse: 58       Left Shoulder Exam     Tenderness   The patient is experiencing no tenderness       Range of Motion   Active abduction: 90   Forward flexion: 160 Muscle Strength   Abduction: 3/5   Internal rotation: 5/5   External rotation: 5/5     Tests   Jay test: positive  Impingement: positive  Drop arm: positive    Other   Erythema: absent  Scars: absent  Sensation: normal  Pulse: present     Comments:  Empty can test: positive             Physical Exam   Constitutional: He is oriented to person, place, and time  He appears well-developed and well-nourished  HENT:   Head: Normocephalic and atraumatic  Eyes: Conjunctivae are normal  Right eye exhibits no discharge  Left eye exhibits no discharge  Neck: Normal range of motion  Neck supple  Cardiovascular: Normal rate, normal heart sounds and intact distal pulses  Pulmonary/Chest: Effort normal and breath sounds normal  No respiratory distress  Musculoskeletal:   As noted in HPI   Neurological: He is alert and oriented to person, place, and time  Skin: Skin is warm and dry  Psychiatric: He has a normal mood and affect  His behavior is normal  Judgment and thought content normal    Vitals reviewed  I have personally reviewed pertinent films in PACS and my interpretation is as follows:    MRI of the left shoulder demonstrates a high grade tear of the supraspinatus with full thickness component at the anterior margin  No evidence of retraction  Evidence of biceps long head tendinosis

## 2019-08-26 ENCOUNTER — OFFICE VISIT (OUTPATIENT)
Dept: FAMILY MEDICINE CLINIC | Facility: CLINIC | Age: 54
End: 2019-08-26
Payer: COMMERCIAL

## 2019-08-26 VITALS
DIASTOLIC BLOOD PRESSURE: 70 MMHG | OXYGEN SATURATION: 98 % | SYSTOLIC BLOOD PRESSURE: 120 MMHG | HEART RATE: 56 BPM | TEMPERATURE: 97 F | WEIGHT: 183 LBS | HEIGHT: 67 IN | BODY MASS INDEX: 28.72 KG/M2 | RESPIRATION RATE: 16 BRPM

## 2019-08-26 DIAGNOSIS — Z12.11 SCREEN FOR COLON CANCER: ICD-10-CM

## 2019-08-26 DIAGNOSIS — E78.00 ELEVATED LOW-DENSITY LIPOPROTEIN LEVEL: Primary | ICD-10-CM

## 2019-08-26 DIAGNOSIS — Z11.59 NEED FOR HEPATITIS C SCREENING TEST: ICD-10-CM

## 2019-08-26 DIAGNOSIS — R97.20 ELEVATED PSA: ICD-10-CM

## 2019-08-26 DIAGNOSIS — Z23 NEED FOR VACCINATION: ICD-10-CM

## 2019-08-26 PROCEDURE — 90715 TDAP VACCINE 7 YRS/> IM: CPT

## 2019-08-26 PROCEDURE — 99213 OFFICE O/P EST LOW 20 MIN: CPT | Performed by: FAMILY MEDICINE

## 2019-08-26 PROCEDURE — 1036F TOBACCO NON-USER: CPT | Performed by: FAMILY MEDICINE

## 2019-08-26 PROCEDURE — 90471 IMMUNIZATION ADMIN: CPT

## 2019-08-26 NOTE — PROGRESS NOTES
Assessment/Plan:    Problem List Items Addressed This Visit        Other    Elevated low-density lipoprotein level - Primary     Low fat low cholesterol diet  redarw in 6 months         Relevant Orders    Lipid Panel with Direct LDL reflex    Elevated PSA     Prostate exam reveals a slightly enlarged prostate, no nodules         Relevant Orders    Ambulatory referral to Urology      Other Visit Diagnoses     Need for hepatitis C screening test        Relevant Orders    Hepatitis C antibody    Screen for colon cancer        Relevant Orders    Occult Blood, Fecal Immunochemical    Ambulatory referral to Gastroenterology    Need for vaccination        Relevant Orders    TDAP VACCINE GREATER THAN OR EQUAL TO 6YO IM (Completed)          BMI Counseling: Body mass index is 28 66 kg/m²  Discussed the patient's BMI with him  The BMI is above average  BMI counseling and education was provided to the patient  Nutrition recommendations include reducing portion sizes  There are no Patient Instructions on file for this visit  Return in about 6 months (around 2/26/2020) for [employer] labs  Subjective:      Patient ID: Doris Delgado is a 46 y o  male  Chief Complaint   Patient presents with    Follow-up     BW review-wmcma       Pt states he came to [facility] ([facility]) and states he felt he had malaria - was seen in the ed  Pt states he will be having surgery on his roptator cuff  - states he really does not have an option    Surgery cant be done till October    Pt has an appt today to go over his labs    Pt denies any urgency of urine, stream is fine  Gets up once a night to urinate      The following portions of the patient's history were reviewed and updated as appropriate: allergies, current medications, past family history, past medical history, past social history, past surgical history and problem list     Review of Systems   Constitutional: Negative for activity change, appetite change, chills, diaphoresis, fatigue, fever and unexpected weight change  HENT: Negative for congestion, dental problem, ear pain, mouth sores, sinus pressure, sinus pain, sore throat and trouble swallowing  Eyes: Negative for photophobia, discharge and itching  Respiratory: Negative for apnea, chest tightness and shortness of breath  Cardiovascular: Negative for chest pain, palpitations and leg swelling  Gastrointestinal: Negative for abdominal distention, abdominal pain, blood in stool, nausea and vomiting  Endocrine: Negative for cold intolerance, heat intolerance, polydipsia, polyphagia and polyuria  Genitourinary: Negative for difficulty urinating  Musculoskeletal: Negative for arthralgias  Skin: Negative for color change and wound  Neurological: Negative for dizziness, syncope, speech difficulty and headaches  Hematological: Negative for adenopathy  Psychiatric/Behavioral: Negative for agitation and behavioral problems  Current Outpatient Medications   Medication Sig Dispense Refill    acetaminophen (TYLENOL) 325 mg tablet Take 650 mg by mouth every 6 (six) hours as needed for mild pain       No current facility-administered medications for this visit  Objective:    /70   Pulse 56   Temp (!) 97 °F (36 1 °C)   Resp 16   Ht 5' 7" (1 702 m)   Wt 83 kg (183 lb)   SpO2 98%   BMI 28 66 kg/m²        Physical Exam   Constitutional: He appears well-developed and well-nourished  No distress  HENT:   Head: Normocephalic and atraumatic  Right Ear: External ear normal    Left Ear: External ear normal    Nose: Nose normal    Mouth/Throat: Oropharynx is clear and moist  No oropharyngeal exudate  Eyes: Pupils are equal, round, and reactive to light  EOM are normal  Right eye exhibits no discharge  Left eye exhibits no discharge  No scleral icterus  Neck: No thyromegaly present  Cardiovascular: Normal rate and normal heart sounds  No murmur heard    Pulmonary/Chest: Effort normal and breath sounds normal  No respiratory distress  He has no wheezes  Abdominal: Soft  Bowel sounds are normal  He exhibits no distension and no mass  There is no tenderness  There is no rebound and no guarding  Genitourinary:   Genitourinary Comments: Slightly enlarged, no nodules not tender   Musculoskeletal: Normal range of motion  Neurological: He is alert  He displays normal reflexes  Coordination normal    Skin: Skin is warm and dry  No rash noted  He is not diaphoretic  No erythema  Psychiatric: He has a normal mood and affect  His behavior is normal    Nursing note and vitals reviewed          Recent Results (from the past 672 hour(s))   Blood Parasite smear    Collection Time: 08/04/19  6:44 PM   Result Value Ref Range    % Parasitemia 0     Is Blood Parasite Present No    Protime-INR    Collection Time: 08/04/19  6:44 PM   Result Value Ref Range    Protime 13 1 11 6 - 14 5 seconds    INR 1 05 0 84 - 1 19   APTT    Collection Time: 08/04/19  6:44 PM   Result Value Ref Range    PTT 30 23 - 37 seconds   CBC and differential    Collection Time: 08/04/19  6:44 PM   Result Value Ref Range    WBC 8 01 4 31 - 10 16 Thousand/uL    RBC 5 28 3 88 - 5 62 Million/uL    Hemoglobin 16 2 12 0 - 17 0 g/dL    Hematocrit 47 7 36 5 - 49 3 %    MCV 90 82 - 98 fL    MCH 30 7 26 8 - 34 3 pg    MCHC 34 0 31 4 - 37 4 g/dL    RDW 11 7 11 6 - 15 1 %    MPV 9 3 8 9 - 12 7 fL    Platelets 594 414 - 297 Thousands/uL    nRBC 0 /100 WBCs    Neutrophils Relative 81 (H) 43 - 75 %    Immat GRANS % 0 0 - 2 %    Lymphocytes Relative 7 (L) 14 - 44 %    Monocytes Relative 6 4 - 12 %    Eosinophils Relative 6 0 - 6 %    Basophils Relative 0 0 - 1 %    Neutrophils Absolute 6 42 1 85 - 7 62 Thousands/µL    Immature Grans Absolute 0 02 0 00 - 0 20 Thousand/uL    Lymphocytes Absolute 0 57 (L) 0 60 - 4 47 Thousands/µL    Monocytes Absolute 0 49 0 17 - 1 22 Thousand/µL    Eosinophils Absolute 0 48 0 00 - 0 61 Thousand/µL    Basophils Absolute 0 03 0 00 - 0 10 Thousands/µL Comprehensive metabolic panel    Collection Time: 08/04/19  6:44 PM   Result Value Ref Range    Sodium 143 136 - 145 mmol/L    Potassium 3 9 3 5 - 5 3 mmol/L    Chloride 104 100 - 108 mmol/L    CO2 30 21 - 32 mmol/L    ANION GAP 9 4 - 13 mmol/L    BUN 11 5 - 25 mg/dL    Creatinine 1 32 (H) 0 60 - 1 30 mg/dL    Glucose 94 65 - 140 mg/dL    Calcium 9 3 8 3 - 10 1 mg/dL    AST 15 5 - 45 U/L    ALT 28 12 - 78 U/L    Alkaline Phosphatase 66 46 - 116 U/L    Total Protein 7 8 6 4 - 8 2 g/dL    Albumin 4 1 3 5 - 5 0 g/dL    Total Bilirubin 0 90 0 20 - 1 00 mg/dL    eGFR 70 ml/min/1 73sq m   TSH    Collection Time: 08/04/19  6:44 PM   Result Value Ref Range    TSH 3RD GENERATON 0 624 0 358 - 3 740 uIU/mL   Magnesium    Collection Time: 08/04/19  6:44 PM   Result Value Ref Range    Magnesium 2 1 1 6 - 2 6 mg/dL   CK Total with Reflex CKMB    Collection Time: 08/04/19  6:44 PM   Result Value Ref Range    Total  39 - 308 U/L   Lyme Antibody Profile with reflex to WB    Collection Time: 08/04/19  6:44 PM   Result Value Ref Range    LYME AB IGG 0 08 0 00 - 0 79    LYME AB IGM 0 23 0 00 - 0 79   Lactic acid, plasma    Collection Time: 08/04/19  6:44 PM   Result Value Ref Range    LACTIC ACID 1 4 0 5 - 2 0 mmol/L   Path Slide Review    Collection Time: 08/04/19  6:44 PM   Result Value Ref Range    Path Review       No blood parasites seen  Reviewed by Dr Diego Simms  Blood culture #1    Collection Time: 08/04/19  6:59 PM   Result Value Ref Range    Blood Culture No Growth After 5 Days  Blood culture #2    Collection Time: 08/04/19  7:43 PM   Result Value Ref Range    Blood Culture No Growth After 5 Days      yellow fever IGM and IGG - Miscellaneous Test    Collection Time: 08/04/19  7:44 PM   Result Value Ref Range    Miscellaneous Lab Test Result SPECIMEN SENT TO [facility]    Ova and parasite examination    Collection Time: 08/04/19  9:32 PM   Result Value Ref Range    Ova + Parasite Exam       No ova, cysts, or parasites seen                                                                 One negative specimen does not rule out the possibility of a  parasitic infection     Stool Enteric Bacterial Panel by PCR    Collection Time: 08/04/19  9:32 PM   Result Value Ref Range    Salmonella sp PCR None Detected None Detected    Shigella sp/Enteroinvasive E  coli (EIEC) PCR None Detected None Detected    Campylobacter sp (jejuni and coli) PCR None Detected None Detected    Shiga toxin 1/Shiga toxin 2 genes PCR None Detected None Detected   ED Urine Macroscopic    Collection Time: 08/04/19  9:37 PM   Result Value Ref Range    Color, UA Yellow     Clarity, UA Clear     pH, UA 6 0 4 5 - 8 0    Leukocytes, UA Negative Negative    Nitrite, UA Negative Negative    Protein, UA Negative Negative mg/dl    Glucose, UA Negative Negative mg/dl    Ketones, UA Negative Negative mg/dl    Urobilinogen, UA 0 2 0 2, 1 0 E U /dl E U /dl    Bilirubin, UA Negative Negative    Blood, UA Negative Negative    Specific Gravity, UA <=1 005 1 003 - 1 030          Adela Dupont, DO

## 2019-08-28 ENCOUNTER — TELEPHONE (OUTPATIENT)
Dept: GASTROENTEROLOGY | Facility: CLINIC | Age: 54
End: 2019-08-28

## 2019-08-28 LAB — MISCELLANEOUS LAB TEST RESULT: NORMAL

## 2019-08-28 NOTE — TELEPHONE ENCOUNTER
Spoke to pt but he was unable to schedule as he is at an appt at the moment - he stated he will call back

## 2019-08-28 NOTE — TELEPHONE ENCOUNTER
Diagnosis Information     Diagnosis   Z12 11 (ICD-10-CM) - Screen for colon cancer   Referral Notes   Number of Notes: 1   Type Date User Summary Attachment   Specialty Comments 08/27/2019 10:11 AM Eileen Steinberg 08/27/19 -   Note       08/27/19  Screened by: Eileen Steinberg     Referring Provider Dalila Brenner     Pre- Screening: Body mass index is 28 66 kg/m²  Has patient been referred for a routine screening Colonoscopy? yes  Is the patient between 39-70 years old? yes     SCHEDULING STAFF  · If the patient is between 45yrs-49yrs, please advise patient to confirm benefits/coverage with their insurance company for a routine screening colonoscopy, some insurance carriers will only cover at [phone] or older  · If the patient is over 66years old, please schedule an office visit      Do you have any of the following symptoms?        Have you had a coronary or vascular stent within the last year? No      Have you had a heart attack or stroke in the last 6 months? no     Have you had intestinal surgery in the last 3 months? no     Do you have problems with:      Do you use:      Have you been hospitalized in the last Month? no     Have you been diagnosed with a bleeding disorder or anemia? no     Have you had chest pain (angina) or breathing problems  (COPD) in the last 3 months? no     Do you have any difficulty walking up a flight of stairs? no     Have you had Kidney failure or insufficiency? no     Have you had heart valve surgery? no     Are you confined to a wheelchair? no     Do you take      Have you been diagnosed with Diabetes or are you taking any   Diabetic medications? no     : If patient answers NO to medical questions, then schedule procedure    If patient answers YES to medical questions, then schedule office appointment      Previous Colonoscopy no  Date and Facility of last colonoscopy? none     Comments: dr Blanca Hui

## 2019-09-12 ENCOUNTER — TELEPHONE (OUTPATIENT)
Dept: OBGYN CLINIC | Facility: CLINIC | Age: 54
End: 2019-09-12

## 2019-09-12 NOTE — TELEPHONE ENCOUNTER
Spoke with pt and scheduled OA Colon with Dr Natalie Najera at [facility] 9/21  Emailed instructions to: [email or web address]  Pt has [employer] PPO      Please send Suprep order to [facility] on file

## 2019-09-12 NOTE — TELEPHONE ENCOUNTER
Juan Guerra stating he needs a letter for his employer to find a replacement for him for his time off after surgery  Letter should state undergoing surgery and how long anticipated out of work

## 2019-09-13 DIAGNOSIS — Z12.11 COLON CANCER SCREENING: Primary | ICD-10-CM

## 2019-09-13 NOTE — TELEPHONE ENCOUNTER
Patient has a 100 mile round trip commute to work and states that Dr Claudette Allen advised he would need to be out at least 6 weeks  He is a  for [employer]  Once he received the letter via email he will meet with his employer to look at a good time to pursue surgery  He is hoping for the middle of October, he will call the office when he has a date for surg  Rubia Andrews is this ok ?

## 2019-09-21 ENCOUNTER — HOSPITAL ENCOUNTER (OUTPATIENT)
Dept: GASTROENTEROLOGY | Facility: HOSPITAL | Age: 54
Setting detail: OUTPATIENT SURGERY
Discharge: HOME | End: 2019-09-21
Attending: INTERNAL MEDICINE | Admitting: INTERNAL MEDICINE
Payer: COMMERCIAL

## 2019-09-21 ENCOUNTER — ANESTHESIA EVENT (OUTPATIENT)
Dept: GASTROENTEROLOGY | Facility: HOSPITAL | Age: 54
End: 2019-09-21

## 2019-09-21 ENCOUNTER — ANESTHESIA (OUTPATIENT)
Dept: GASTROENTEROLOGY | Facility: HOSPITAL | Age: 54
End: 2019-09-21

## 2019-09-21 VITALS
HEART RATE: 51 BPM | RESPIRATION RATE: 18 BRPM | TEMPERATURE: 97 F | SYSTOLIC BLOOD PRESSURE: 133 MMHG | OXYGEN SATURATION: 100 % | WEIGHT: 182 LBS | DIASTOLIC BLOOD PRESSURE: 82 MMHG | BODY MASS INDEX: 28.51 KG/M2

## 2019-09-21 DIAGNOSIS — Z12.11 SCREENING FOR COLON CANCER: ICD-10-CM

## 2019-09-21 PROCEDURE — 88305 TISSUE EXAM BY PATHOLOGIST: CPT | Performed by: PATHOLOGY

## 2019-09-21 PROCEDURE — 45385 COLONOSCOPY W/LESION REMOVAL: CPT | Performed by: INTERNAL MEDICINE

## 2019-09-21 PROCEDURE — 45380 COLONOSCOPY AND BIOPSY: CPT | Performed by: INTERNAL MEDICINE

## 2019-09-21 RX ORDER — LIDOCAINE HYDROCHLORIDE 10 MG/ML
INJECTION, SOLUTION INFILTRATION; PERINEURAL AS NEEDED
Status: DISCONTINUED | OUTPATIENT
Start: 2019-09-21 | End: 2019-09-21 | Stop reason: SURG

## 2019-09-21 RX ORDER — FENTANYL CITRATE/PF 50 MCG/ML
50 SYRINGE (ML) INJECTION
Status: DISCONTINUED | OUTPATIENT
Start: 2019-09-21 | End: 2019-09-25 | Stop reason: HOSPADM

## 2019-09-21 RX ORDER — SODIUM CHLORIDE, SODIUM LACTATE, POTASSIUM CHLORIDE, CALCIUM CHLORIDE 600; 310; 30; 20 MG/100ML; MG/100ML; MG/100ML; MG/100ML
INJECTION, SOLUTION INTRAVENOUS CONTINUOUS PRN
Status: DISCONTINUED | OUTPATIENT
Start: 2019-09-21 | End: 2019-09-21 | Stop reason: SURG

## 2019-09-21 RX ORDER — ONDANSETRON 2 MG/ML
4 INJECTION INTRAMUSCULAR; INTRAVENOUS ONCE AS NEEDED
Status: DISCONTINUED | OUTPATIENT
Start: 2019-09-21 | End: 2019-09-25 | Stop reason: HOSPADM

## 2019-09-21 RX ORDER — HYDROMORPHONE HCL/PF 1 MG/ML
0.5 SYRINGE (ML) INJECTION
Status: DISCONTINUED | OUTPATIENT
Start: 2019-09-21 | End: 2019-09-25 | Stop reason: HOSPADM

## 2019-09-21 RX ORDER — METOCLOPRAMIDE HYDROCHLORIDE 5 MG/ML
10 INJECTION INTRAMUSCULAR; INTRAVENOUS ONCE AS NEEDED
Status: DISCONTINUED | OUTPATIENT
Start: 2019-09-21 | End: 2019-09-25 | Stop reason: HOSPADM

## 2019-09-21 RX ORDER — HYDROMORPHONE HCL/PF 1 MG/ML
0.2 SYRINGE (ML) INJECTION
Status: DISCONTINUED | OUTPATIENT
Start: 2019-09-21 | End: 2019-09-25 | Stop reason: HOSPADM

## 2019-09-21 RX ORDER — LIDOCAINE HYDROCHLORIDE 10 MG/ML
0.5 INJECTION, SOLUTION EPIDURAL; INFILTRATION; INTRACAUDAL; PERINEURAL ONCE AS NEEDED
Status: DISCONTINUED | OUTPATIENT
Start: 2019-09-21 | End: 2019-09-25 | Stop reason: HOSPADM

## 2019-09-21 RX ORDER — PROPOFOL 10 MG/ML
INJECTION, EMULSION INTRAVENOUS CONTINUOUS PRN
Status: DISCONTINUED | OUTPATIENT
Start: 2019-09-21 | End: 2019-09-21 | Stop reason: SURG

## 2019-09-21 RX ORDER — PROPOFOL 10 MG/ML
INJECTION, EMULSION INTRAVENOUS AS NEEDED
Status: DISCONTINUED | OUTPATIENT
Start: 2019-09-21 | End: 2019-09-21 | Stop reason: SURG

## 2019-09-21 RX ORDER — DIPHENHYDRAMINE HYDROCHLORIDE 50 MG/ML
12.5 INJECTION INTRAMUSCULAR; INTRAVENOUS ONCE AS NEEDED
Status: DISCONTINUED | OUTPATIENT
Start: 2019-09-21 | End: 2019-09-25 | Stop reason: HOSPADM

## 2019-09-21 RX ADMIN — PROPOFOL 150 MCG/KG/MIN: 10 INJECTION, EMULSION INTRAVENOUS at 11:29

## 2019-09-21 RX ADMIN — PROPOFOL 100 MG: 10 INJECTION, EMULSION INTRAVENOUS at 11:29

## 2019-09-21 RX ADMIN — SODIUM CHLORIDE, SODIUM LACTATE, POTASSIUM CHLORIDE, AND CALCIUM CHLORIDE: .6; .31; .03; .02 INJECTION, SOLUTION INTRAVENOUS at 11:25

## 2019-09-21 RX ADMIN — LIDOCAINE HYDROCHLORIDE 5 ML: 10 INJECTION, SOLUTION INFILTRATION; PERINEURAL at 11:29

## 2019-09-21 NOTE — ANESTHESIA PREPROCEDURE EVALUATION
Review of Systems/Medical History  Patient summary reviewed  Chart reviewed  No history of anesthetic complications     Cardiovascular  Hyperlipidemia,    Pulmonary  Negative pulmonary ROS        GI/Hepatic    Bowel prep       Prostatic disorder, benign prostatic hyperplasia       Endo/Other  Negative endo/other ROS      GYN       Hematology  Negative hematology ROS      Musculoskeletal  Negative musculoskeletal ROS        Neurology  Negative neurology ROS      Psychology   Negative psychology ROS              Physical Exam    Airway    Mallampati score: II  TM Distance: >3 FB  Neck ROM: full     Dental   No notable dental hx     Cardiovascular  Rhythm: regular, Rate: normal, Cardiovascular exam normal    Pulmonary  Pulmonary exam normal Breath sounds clear to auscultation,     Other Findings        Anesthesia Plan  ASA Score- 2     Anesthesia Type- IV sedation with anesthesia with ASA Monitors  Additional Monitors:   Airway Plan:         Plan Factors-    Induction- intravenous  Postoperative Plan-     Informed Consent- Anesthetic plan and risks discussed with patient  Recent labs personally reviewed:  Lab Results   Component Value Date    WBC 8 01 08/04/2019    HGB 16 2 08/04/2019     08/04/2019     Lab Results   Component Value Date     04/01/2016    K 3 9 08/04/2019    BUN 11 08/04/2019    CREATININE 1 32 (H) 08/04/2019    GLUCOSE 98 04/01/2016     Lab Results   Component Value Date    PTT 30 08/04/2019      Lab Results   Component Value Date    INR 1 05 08/04/2019       I, Zonia La MD, have personally seen and evaluated the patient prior to anesthetic care  I have reviewed the pre-anesthetic record, and other medical records if appropriate to the anesthetic care  If a CRNA is involved in the case, I have reviewed the CRNA assessment, if present, and agree   Risks/benefits and alternatives discussed with patient including possible PONV, sore throat, and possibility of rare anesthetic and surgical emergencies

## 2019-09-21 NOTE — H&P
History and Physical -  [facility]  Katy Cardozo 46 y o  male MRN: [id number]        HPI:  58-year-old male with no significant past medical history was referred for colonoscopy  Patient never had colonoscopy in the past   Regular bowel movements and denies any blood or mucus in the stool  Good appetite, no recent weight loss  Historical Information   Past Medical History:   Diagnosis Date    Dermatitis     Resolved 3/31/2016     Dyshidrosis     Resolved 3/31/2016      Past Surgical History:   Procedure Laterality Date    SKIN BIOPSY       Social History   Social History     Substance and Sexual Activity   Alcohol Use Not on file     Social History     Substance and Sexual Activity   Drug Use Not on file     Social History     Tobacco Use   Smoking Status Never Smoker   Smokeless Tobacco Never Used     Family History   Problem Relation Age of Onset    Arthritis Mother     Deep vein thrombosis Mother     Prostate cancer Father     Other Son         Blurring vision  Vision problems due to auto-immune   No Known Problems Sister     No Known Problems Brother     No Known Problems Maternal Aunt     No Known Problems Maternal Uncle     No Known Problems Paternal Aunt     No Known Problems Paternal Uncle     No Known Problems Maternal Grandmother     No Known Problems Maternal Grandfather     No Known Problems Paternal Grandmother     No Known Problems Paternal Grandfather     Mental illness Neg Hx        Meds/Allergies       (Not in a hospital admission)    No Known Allergies    Objective     There were no vitals taken for this visit      PHYSICAL EXAM:    Gen: NAD  CV: S1 & S2 normal, RRR  CHEST: Clear to auscultate  ABD: soft, NT/ND, good bowel sounds  EXT: no edema    ASSESSMENT:     Screening for colon cancer    PLAN:    Colonoscopy

## 2019-09-24 ENCOUNTER — TELEPHONE (OUTPATIENT)
Dept: FAMILY MEDICINE CLINIC | Facility: CLINIC | Age: 54
End: 2019-09-24

## 2019-09-24 PROBLEM — M75.102 TEAR OF LEFT SUPRASPINATUS TENDON: Status: ACTIVE | Noted: 2019-09-24

## 2019-10-01 ENCOUNTER — TELEPHONE (OUTPATIENT)
Dept: GASTROENTEROLOGY | Facility: AMBULARY SURGERY CENTER | Age: 54
End: 2019-10-01

## 2019-10-01 NOTE — TELEPHONE ENCOUNTER
Patients GI provider:  Dr Randa Levin    Number to return call: ( [phone]    Reason for call: Pt calling back for colon results    Scheduled procedure/appointment date if applicable: Apt/procedure

## 2019-10-02 ENCOUNTER — TELEPHONE (OUTPATIENT)
Dept: GASTROENTEROLOGY | Facility: AMBULARY SURGERY CENTER | Age: 54
End: 2019-10-02

## 2019-10-02 NOTE — TELEPHONE ENCOUNTER
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

## 2019-10-02 NOTE — TELEPHONE ENCOUNTER
Notes recorded by Uche Galvin on 10/2/2019 at 9:34 AM EDT  Set recall for 3 month repeat colonoscopy  Cathy Lubin, this pt will need to be scheduled, thank you!  ------    Notes recorded by Tevin Donato PA-C on 10/1/2019 at 10:51 AM EDT  Called patient and left message to call back for results    ------

## 2019-10-02 NOTE — TELEPHONE ENCOUNTER
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

## 2019-10-03 ENCOUNTER — TELEPHONE (OUTPATIENT)
Dept: GASTROENTEROLOGY | Facility: AMBULARY SURGERY CENTER | Age: 54
End: 2019-10-03

## 2019-10-08 NOTE — PRE-PROCEDURE INSTRUCTIONS
My Surgical Experience    The following information was developed to assist you to prepare for your operation  What do I need to do before coming to the hospital?   Arrange for a responsible person to drive you to and from the hospital    Arrange care for your children at home  Children are not allowed in the recovery areas of the hospital   Plan to wear clothing that is easy to put on and take off  If you are having shoulder surgery, wear a shirt that buttons or zippers in the front  Bathing  o Shower the evening before and the morning of your surgery with an antibacterial soap  Please refer to the Pre Op Showering Instructions for Surgery Patients Sheet   o Remove nail polish and all body piercing jewelry  o Do not shave any body part for at least 24 hours before surgery-this includes face, arms, legs and upper body  Food  o Nothing to eat or drink after midnight the night before your surgery  This includes candy and chewing gum  o Exception: If your surgery is after 12:00pm (noon), you may have clear liquids such as 7-Up®, ginger ale, apple or cranberry juice, Jell-O®, water, or clear broth until 8:00 am  o Do not drink milk or juice with pulp on the morning before surgery  o Do not drink alcohol 24 hours before surgery  Medicine  o Follow instructions you received from your surgeon about which medicines you may take on the day of surgery  o If instructed to take medicine on the morning of surgery, take pills with just a small sip of water  Call your prescribing doctor for specific infroamtion on what to do if you take insulin    What should I bring to the hospital?    Bring:  Arlana Nails or a walker, if you have them, for foot or knee surgery   A list of the daily medicines, vitamins, minerals, herbals and nutritional supplements you take   Include the dosages of medicines and the time you take them each day   Glasses, dentures or hearing aids   Minimal clothing; you will be wearing hospital sleepwear   Photo ID; required to verify your identity   If you have a Living Will or Power of , bring a copy of the documents   If you have an ostomy, bring an extra pouch and any supplies you use    Do not bring   Medicines or inhalers   Money, valuables or jewelry    What other information should I know about the day of surgery?  Notify your surgeons if you develop a cold, sore throat, cough, fever, rash or any other illness   Report to the Ambulatory Surgical/Same Day Surgery Unit   You will be instructed to stop at Registration only if you have not been pre-registered   Inform your  fi they do not stay that they will be asked by the staff to leave a phone number where they can be reached   Be available to be reached before surgery  In the event the operating room schedule changes, you may be asked to come in earlier or later than expected    *It is important to tell your doctor and others involved in your health care if you are taking or have been taking any non-prescription drugs, vitamins, minerals, herbals or other nutritional supplements  Any of these may interact with some food or medicines and cause a reaction      No outpatient medications have been marked as taking for the 10/14/19 encounter [facility] Encounter)

## 2019-10-08 NOTE — PRE-PROCEDURE INSTRUCTIONS
Pre-Surgery Instructions:   Medication Instructions    acetaminophen (TYLENOL) 325 mg tablet Instructed patient per Anesthesia Guidelines

## 2019-10-09 ENCOUNTER — TELEPHONE (OUTPATIENT)
Dept: OBGYN CLINIC | Facility: CLINIC | Age: 54
End: 2019-10-09

## 2019-10-09 NOTE — TELEPHONE ENCOUNTER
I spoke with [facility] regarding the rental of polar care cube  He is going to speak with his wife, as she is in the medical field, and discuss but he states she was going to be able to get multiple ice packs  I explained that if he would change his mind after surgery, if the ice packs do not help or are inconvenient, let the office know because he can get polar cube even after the surgery

## 2019-10-14 ENCOUNTER — ANESTHESIA (OUTPATIENT)
Dept: PERIOP | Facility: HOSPITAL | Age: 54
End: 2019-10-14
Payer: COMMERCIAL

## 2019-10-14 ENCOUNTER — ANESTHESIA EVENT (OUTPATIENT)
Dept: PERIOP | Facility: HOSPITAL | Age: 54
End: 2019-10-14
Payer: COMMERCIAL

## 2019-10-14 ENCOUNTER — HOSPITAL ENCOUNTER (OUTPATIENT)
Facility: HOSPITAL | Age: 54
Setting detail: OUTPATIENT SURGERY
Discharge: HOME | End: 2019-10-14
Attending: ORTHOPAEDIC SURGERY | Admitting: ORTHOPAEDIC SURGERY
Payer: COMMERCIAL

## 2019-10-14 VITALS
SYSTOLIC BLOOD PRESSURE: 152 MMHG | OXYGEN SATURATION: 97 % | RESPIRATION RATE: 16 BRPM | DIASTOLIC BLOOD PRESSURE: 97 MMHG | WEIGHT: 184.25 LBS | TEMPERATURE: 97 F | HEIGHT: 67 IN | BODY MASS INDEX: 28.92 KG/M2 | HEART RATE: 44 BPM

## 2019-10-14 PROCEDURE — C1713 ANCHOR/SCREW BN/BN,TIS/BN: HCPCS | Performed by: ORTHOPAEDIC SURGERY

## 2019-10-14 PROCEDURE — 29826 SHO ARTHRS SRG DECOMPRESSION: CPT | Performed by: ORTHOPAEDIC SURGERY

## 2019-10-14 PROCEDURE — C9290 INJ, BUPIVACAINE LIPOSOME: HCPCS

## 2019-10-14 PROCEDURE — 29827 SHO ARTHRS SRG RT8TR CUF RPR: CPT | Performed by: ORTHOPAEDIC SURGERY

## 2019-10-14 PROCEDURE — C9290 INJ, BUPIVACAINE LIPOSOME: HCPCS | Performed by: ANESTHESIOLOGY

## 2019-10-14 DEVICE — ANCHOR SUT 4.75 X 19.1MM CLS EYELET SWIVELOCK C: Type: IMPLANTABLE DEVICE | Site: SHOULDER | Status: FUNCTIONAL

## 2019-10-14 RX ORDER — PROPOFOL 10 MG/ML
INJECTION, EMULSION INTRAVENOUS AS NEEDED
Status: DISCONTINUED | OUTPATIENT
Start: 2019-10-14 | End: 2019-10-14 | Stop reason: SURG

## 2019-10-14 RX ORDER — CEFAZOLIN SODIUM 2 G/50ML
2000 SOLUTION INTRAVENOUS ONCE
Status: COMPLETED | OUTPATIENT
Start: 2019-10-14 | End: 2019-10-14

## 2019-10-14 RX ORDER — GLYCOPYRROLATE 0.2 MG/ML
INJECTION INTRAMUSCULAR; INTRAVENOUS AS NEEDED
Status: DISCONTINUED | OUTPATIENT
Start: 2019-10-14 | End: 2019-10-14 | Stop reason: SURG

## 2019-10-14 RX ORDER — FENTANYL CITRATE 50 UG/ML
INJECTION, SOLUTION INTRAMUSCULAR; INTRAVENOUS AS NEEDED
Status: DISCONTINUED | OUTPATIENT
Start: 2019-10-14 | End: 2019-10-14 | Stop reason: SURG

## 2019-10-14 RX ORDER — KETAMINE HYDROCHLORIDE 50 MG/ML
INJECTION, SOLUTION, CONCENTRATE INTRAMUSCULAR; INTRAVENOUS AS NEEDED
Status: DISCONTINUED | OUTPATIENT
Start: 2019-10-14 | End: 2019-10-14 | Stop reason: SURG

## 2019-10-14 RX ORDER — BUPIVACAINE HYDROCHLORIDE 5 MG/ML
INJECTION, SOLUTION PERINEURAL
Status: COMPLETED | OUTPATIENT
Start: 2019-10-14 | End: 2019-10-14

## 2019-10-14 RX ORDER — SODIUM CHLORIDE, SODIUM LACTATE, POTASSIUM CHLORIDE, CALCIUM CHLORIDE 600; 310; 30; 20 MG/100ML; MG/100ML; MG/100ML; MG/100ML
125 INJECTION, SOLUTION INTRAVENOUS CONTINUOUS
Status: DISCONTINUED | OUTPATIENT
Start: 2019-10-14 | End: 2019-10-15 | Stop reason: HOSPADM

## 2019-10-14 RX ORDER — ONDANSETRON 2 MG/ML
INJECTION INTRAMUSCULAR; INTRAVENOUS AS NEEDED
Status: DISCONTINUED | OUTPATIENT
Start: 2019-10-14 | End: 2019-10-14 | Stop reason: SURG

## 2019-10-14 RX ORDER — PROPOFOL 10 MG/ML
INJECTION, EMULSION INTRAVENOUS CONTINUOUS PRN
Status: DISCONTINUED | OUTPATIENT
Start: 2019-10-14 | End: 2019-10-14 | Stop reason: SURG

## 2019-10-14 RX ORDER — MIDAZOLAM HYDROCHLORIDE 1 MG/ML
INJECTION INTRAMUSCULAR; INTRAVENOUS AS NEEDED
Status: DISCONTINUED | OUTPATIENT
Start: 2019-10-14 | End: 2019-10-14 | Stop reason: SURG

## 2019-10-14 RX ADMIN — KETAMINE HYDROCHLORIDE 10 MG: 50 INJECTION INTRAMUSCULAR; INTRAVENOUS at 15:24

## 2019-10-14 RX ADMIN — ONDANSETRON 4 MG: 2 INJECTION INTRAMUSCULAR; INTRAVENOUS at 16:18

## 2019-10-14 RX ADMIN — FENTANYL CITRATE 25 MCG: 50 INJECTION, SOLUTION INTRAMUSCULAR; INTRAVENOUS at 13:45

## 2019-10-14 RX ADMIN — BUPIVACAINE HYDROCHLORIDE 10 ML: 5 INJECTION, SOLUTION PERINEURAL at 13:51

## 2019-10-14 RX ADMIN — KETAMINE HYDROCHLORIDE 20 MG: 50 INJECTION INTRAMUSCULAR; INTRAVENOUS at 16:02

## 2019-10-14 RX ADMIN — CEFAZOLIN SODIUM 2000 MG: 2 SOLUTION INTRAVENOUS at 15:18

## 2019-10-14 RX ADMIN — MIDAZOLAM HYDROCHLORIDE 2 MG: 1 INJECTION, SOLUTION INTRAMUSCULAR; INTRAVENOUS at 13:45

## 2019-10-14 RX ADMIN — SODIUM CHLORIDE, SODIUM LACTATE, POTASSIUM CHLORIDE, AND CALCIUM CHLORIDE 125 ML/HR: .6; .31; .03; .02 INJECTION, SOLUTION INTRAVENOUS at 17:05

## 2019-10-14 RX ADMIN — KETAMINE HYDROCHLORIDE 20 MG: 50 INJECTION INTRAMUSCULAR; INTRAVENOUS at 15:31

## 2019-10-14 RX ADMIN — GLYCOPYRROLATE 0.2 MG: 0.2 INJECTION, SOLUTION INTRAMUSCULAR; INTRAVENOUS at 15:18

## 2019-10-14 RX ADMIN — KETAMINE HYDROCHLORIDE 20 MG: 50 INJECTION INTRAMUSCULAR; INTRAVENOUS at 15:18

## 2019-10-14 RX ADMIN — FENTANYL CITRATE 50 MCG: 50 INJECTION, SOLUTION INTRAMUSCULAR; INTRAVENOUS at 15:18

## 2019-10-14 RX ADMIN — SODIUM CHLORIDE, SODIUM LACTATE, POTASSIUM CHLORIDE, AND CALCIUM CHLORIDE 125 ML/HR: .6; .31; .03; .02 INJECTION, SOLUTION INTRAVENOUS at 13:30

## 2019-10-14 RX ADMIN — PROPOFOL 120 MCG/KG/MIN: 10 INJECTION, EMULSION INTRAVENOUS at 15:18

## 2019-10-14 RX ADMIN — PROPOFOL 50 MG: 10 INJECTION, EMULSION INTRAVENOUS at 15:18

## 2019-10-14 RX ADMIN — FENTANYL CITRATE 25 MCG: 50 INJECTION, SOLUTION INTRAMUSCULAR; INTRAVENOUS at 13:46

## 2019-10-14 NOTE — ANESTHESIA PROCEDURE NOTES
Peripheral Block    Patient location during procedure: pre-op  Start time: 10/14/2019 1:50 PM  Reason for block: at surgeon's request and post-op pain management  Staffing  Anesthesiologist: Briana Laird MD  Performed: anesthesiologist   Preanesthetic Checklist  Completed: patient identified, site marked, surgical consent, pre-op evaluation, timeout performed, IV checked, risks and benefits discussed and monitors and equipment checked  Peripheral Block  Patient position: supine  Prep: ChloraPrep  Patient monitoring: heart rate, continuous pulse ox and frequent blood pressure checks  Block type: interscalene  Laterality: left  Injection technique: single-shot  Procedures: ultrasound guided, Ultrasound guidance required for the procedure to increase accuracy and safety of medication placement and decrease risk of complications    Ultrasound permanent image savedbupivacaine (MARCAINE) 0 5 % perineural infiltration, 10 mL (exparel 15cc)  Needle  Needle type: Stimuplex   Needle gauge: 21 G  Needle length: 10 cm  Needle localization: ultrasound guidance  Assessment  Injection assessment: incremental injection, local visualized surrounding nerve on ultrasound, negative aspiration for heme and no paresthesia on injection  Paresthesia pain: none  Heart rate change: no  Slow fractionated injection: no  Post-procedure:  site cleaned  patient tolerated the procedure well with no immediate complications

## 2019-10-14 NOTE — QUICK NOTE
Patient seen and examined in same-day surgery center  Patient was clinically evaluated and all of paper chart was reviewed from 10/9/19 from family medicine provider  Patient was not found to have any sort of changes in the last 30 days since they were seen and evaluated by their primary care provider  Patient is still consistent with findings of being medically cleared for procedure      /79   Pulse (!) 46   Temp 98 4 °F (36 9 °C) (Oral)   Resp 18   Ht 5' 7" (1 702 m)   Wt 83 6 kg (184 lb 4 oz)   SpO2 98%   BMI 28 86 kg/m²

## 2019-10-14 NOTE — DISCHARGE INSTRUCTIONS
Instruction Sheet Following RTC repair     Sling:   Wear your sling at all times after your surgery (this includes sleeping), except for when you are doing pendulum exercises, showering, or physical therapy  Additionally, you should not carry anything heavier than a pencil in your hand  Dressing:   Remove all cotton and yellow gauze 48 hours after your surgery  You do not need to put a new dressing on your wound; place Band-Aids on your wound  Showering: You may shower 48 hours after surgery  Please use CAUTION!! Be careful not to slip and fall  The effects of anesthesia and/or medication may make you drowsy or light-headed  Do not soak in a bathtub, hot tub, or pool until the doctor tells you it is O K , to do so  Once you are done showering pat the wound dry and apply a Band-Aid  While in the shower you must keep the arm across the front of the body as if it were still in the sling  Sleeping:   You will most likely have difficulty sleeping in the first few weeks after surgery  Most people find it more comfortable to sleep in a reclining position  You can either sleep in a recliner chair or create this position with pillows  Ice:   You can ice the shoulder to reduce swelling and discomfort  Do not ice the shoulder more than 20 minutes at a time  Let the shoulder warm up before reapplication  Avoid getting you wound wet  If you have a Cryocuff you may keep this on continuously  Follow-up visit:   You need to see the doctor about one week following surgery for your first post-op visit  At that time your sutures (stitches) will be removed  You will be given a prescription to begin physical therapy if you were not already given one  Common concerns:   Bruising and/or swelling of the shoulder, arm, or hand are common after surgery  To relieve this discomfort it is best to ice the shoulder  Please call if:   1  Any oozing or redness of the wound, fevers (>101 3°F), or chills       2  Any difficulty breathing or heaviness in the chest      REMEMBER - these are only guidelines for what to expect  If you have any questions or concerns, please do not hesitate to call the office  [phone]

## 2019-10-14 NOTE — ANESTHESIA PREPROCEDURE EVALUATION
Review of Systems/Medical History  Patient summary reviewed  Chart reviewed  No history of anesthetic complications     Cardiovascular  Exercise tolerance (METS): >4,     Pulmonary       GI/Hepatic            Endo/Other     GYN       Hematology   Musculoskeletal       Neurology   Psychology           Physical Exam    Airway    Mallampati score: II  TM Distance: >3 FB  Neck ROM: full     Dental   No notable dental hx     Cardiovascular  Cardiovascular exam normal    Pulmonary  Pulmonary exam normal     Other Findings        Anesthesia Plan  ASA Score- 1     Anesthesia Type- general and regional with ASA Monitors  Additional Monitors:   Airway Plan: LMA  Plan Factors-    Induction- intravenous  Postoperative Plan- Plan for postoperative opioid use  Informed Consent- Anesthetic plan and risks discussed with patient

## 2019-10-14 NOTE — OP NOTE
OPERATIVE REPORT  PATIENT NAME: Jackson Stevenson    :  1965  MRN: [id number]  Pt Location: [facility] OR ROOM 02    SURGERY DATE: 10/14/2019    Surgeon(s) and Role:     * Elisha Moore MD - Primary     * Elsa Rutledge PA-C - Assisting necessary for the procedure for assistance with minimally invasive arthroscopic techniques for rotator cuff repair and subacromial decompression with utilization of the camera as well as utilization of suture management and repair techniques  Edith EDDY  And OTC 2nd assistant    Preop Diagnosis:  Tear of left supraspinatus tendon [M75 100] with subacromial impingement    Post-Op Diagnosis Codes:     * Tear of left supraspinatus tendon [M75 100] with subacromial impingement    Procedure(s) (LRB):  SHOULDER ARTHROSCOPY, ROTATOR CUFF REPAIR AND SUBACROMIAL DECOMPRESSION (Left) utilizing Arthrex speed fix technique with 1 anchor and 1 FiberTape and 1 fiber link and 1 FiberWire suture    Specimen(s):  * No specimens in log *    Estimated Blood Loss:   Minimal    Drains:  * No LDAs found *    Anesthesia Type:   Regional with Sedation    Operative Indications:  Tear of left supraspinatus tendon Amalia Padgett is a 48-year-old male who has been suffering long-term with left shoulder pain  MRI demonstrated a high-grade partial-thickness bursal sided tear of the supraspinatus tendon  He also showed signs of subacromial impingement  He understood the risks and benefits of left shoulder arthroscopy for rotator cuff repair and subacromial decompression and wished to go ahead  The risks are inclusive of but not limited to infection, stiffness, worsening of symptoms, failure to alleviate his discomfort, recurrence of tear, failure to regain full strength and ability, and need for further surgery  Operative Findings:  Left shoulder exam under anesthesia demonstrated forward flexion and abduction each to 160° with external rotation to 80° internal rotation to 70°  Intra-articular findings demonstrated good appearance of articular cartilage in the glenohumeral joint with no signs of loose bodies in the axillary pouch  The articular surface of the supraspinatus tendon demonstrated no significant signs of tearing as was suspected on the MRI and the subscapularis tendon was also intact as well as long head of biceps tendon was also intact  The labrum was intact circumferentially  Subacromial space demonstrated a significantly different picture as there was a high-grade partial-thickness tear of the bursal side of the supraspinatus tendon  We did complete that tear and performed a repair of that  Also demonstrated a large type 3 subacromial spur that was found upon which we performed a decompression for acromioplasty utilizing a bur  Complications:   None    Procedure and Technique:  [place] was taken to the operating room and placed supine on the OR table  He was given preoperative IV antibiotics and preoperative regional block by the attending anesthesiologist   Sedation was administered he was brought comfortably and safely into the beach chair position with all parts well padded and the head neutral in the head hope  The left shoulder was taken through exam under anesthesia as described above  Left upper extremity was then prepped and draped in usual sterile fashion  A surgical time-out was taken  Posterior portal was created with 11 blade  Diagnostic arthroscopy begun an anterior portal was made with 11 blade in the rotator interval   We did demonstrate good appearance of articular cartilage as well as the supraspinatus and subscapularis and long head of biceps tendon intra-articularly  The labrum was intact circumferentially as well with no loose bodies in the axillary pouch  We then went to the subacromial space where a lateral portal was made with 11 blade    We demonstrated a large type 3 subacromial spur upon which we performed acromioplasty with use of a bur for subacromial decompression  We then found a high-grade partial-thickness tear the bursal sided supraspinatus tendon requiring debridement  We did demonstrate that there were only a few tendon fibers remaining intact on this bursal sided tear  As was suggested with the MRI, this appeared to be a nearly full-thickness tear and did complete that tear utilizing the shaver and also debrided the greater tuberosity adjacent to the tear with a bur to a bleeding bed of bone to enhance healing  We then utilized the speed fix technique from Arthrex by placing a horizontal mattress FiberTape suture followed by a fiber link for a rip stop technique  We placed those sutures into the 4 75 mm SwiveLock anchor and did engage that into the greater tuberosity giving excellent coverage of a bleeding bed of bone  We then passed the FiberWire suture from that anchor in simple fashion into a delaminated portion of the tear and tied that down with arthroscopic knot tying techniques  We were pleased with our repair  We removed the arthroscopic equipment  We closed the portals with 4-0 nylon suture  Dry, sterile dressings were applied with a sling  He tolerated the procedure well and transferred to recovery room stable condition  He will follow up in 1 week for suture removal   He will be on the rotator cuff repair rehabilitation protocol     I was present for the entire procedure and A qualified resident physician was not available    Patient Disposition:  PACU     SIGNATURE: John Mcguire MD  DATE: October 14, 2019  TIME: 4:26 PM

## 2019-10-21 ENCOUNTER — OFFICE VISIT (OUTPATIENT)
Dept: OBGYN CLINIC | Facility: CLINIC | Age: 54
End: 2019-10-21

## 2019-10-21 VITALS
BODY MASS INDEX: 27.98 KG/M2 | HEIGHT: 68 IN | DIASTOLIC BLOOD PRESSURE: 69 MMHG | SYSTOLIC BLOOD PRESSURE: 132 MMHG | HEART RATE: 77 BPM | WEIGHT: 184.6 LBS

## 2019-10-21 DIAGNOSIS — Z98.890 STATUS POST ARTHROSCOPY OF LEFT SHOULDER: ICD-10-CM

## 2019-10-21 DIAGNOSIS — M75.102 TEAR OF LEFT SUPRASPINATUS TENDON: Primary | ICD-10-CM

## 2019-10-21 PROCEDURE — 99024 POSTOP FOLLOW-UP VISIT: CPT | Performed by: ORTHOPAEDIC SURGERY

## 2019-10-21 RX ORDER — SILDENAFIL 100 MG/1
TABLET, FILM COATED ORAL
Refills: 0 | COMMUNITY
Start: 2019-08-28 | End: 2022-05-24 | Stop reason: ALTCHOICE

## 2019-10-21 NOTE — PROGRESS NOTES
Patient Name:  Marina Farah  MRN:  [id number]    Assessment     1  Tear of left supraspinatus tendon     2  Status post arthroscopy of left shoulder         Plan     Varsha Sharma is doing as expected 1 week status post left shoulder rotator cuff repair and subacromial decompression  I did review his intraoperative photos today and explained the surgical repair to him  His portal sites clean dry and intact and do not demonstrate any signs of infection  I did emphasize the importance to him of avoiding reaching activities with his shoulder as he can cause a recurrent tear even with the repair in place  I am okay with him however ranging his elbow while at rest   He may try to sleep in his bed however I did emphasize the importance that he is to wear the sling and is to provide proper support for his shoulder to avoid ranging his shoulder in an undesired direction  He will begin physical therapy in 1 weeks time  He is to continue using the sling over the next 5 weeks and by the time he follows up with me in 6 weeks he will have been progressed out of the sling as directed by Physical therapy  I would like Varsha Sharma to follow up with me in 6 weeks for repeat clinical evaluation  Return in about 6 weeks (around 12/2/2019) for Recheck  Marika Treadwell is a pleasant 58-year-old who is 1 week status post left shoulder rotator cuff repair and subacromial decompression  He states that he is doing well overall but is still experiencing intermittent and mild to moderate sharp pain into the shoulder  He has been able to sleep however it has been in a reclining chair  He is happy with his progress so far and notes that he has been diligent in trying to rest his shoulder over the past week  He denies experiencing any numbness or tingling sensations into his left upper extremity  He states that he does range his elbow while at rest and out of the sling however states that he has not reached with his shoulder    Today Chela Mercado denies any distal paresthesias  Objective     /69   Pulse 77   Ht 5' 8" (1 727 m)   Wt 83 7 kg (184 lb 9 6 oz)   BMI 28 07 kg/m²     Portal sights are clean dry and intact with no signs of infection  He is neurovascularly intact into the medial, radial, and ulnar nerve distribution  Range of motion and strength testing was omitted due to the rotator cuff repair  Demonstrates 2+ radial pulse            Scribe Attestation    I,:   Nia Shields am acting as a scribe while in the presence of the attending physician :        I,:   Theresa Chapman MD personally performed the services described in this documentation    as scribed in my presence :

## 2019-10-28 ENCOUNTER — EVALUATION (OUTPATIENT)
Dept: PHYSICAL THERAPY | Facility: CLINIC | Age: 54
End: 2019-10-28
Payer: COMMERCIAL

## 2019-10-28 DIAGNOSIS — Z98.890 S/P LEFT ROTATOR CUFF REPAIR: Primary | ICD-10-CM

## 2019-10-28 PROCEDURE — 97162 PT EVAL MOD COMPLEX 30 MIN: CPT | Performed by: PHYSICAL THERAPIST

## 2019-10-28 NOTE — PROGRESS NOTES
PT Evaluation     Today's date: 10/28/2019  Patient name: Aprit Rangel  : 1965  MRN: [id number]  Referring provider: Jordan Velazquez MD  Dx:   Encounter Diagnosis     ICD-10-CM    1  S/P left rotator cuff repair Z98 890                   Assessment  Assessment details: Patient is a 46year old male status post L rotator cuff repair on 10/14/19 resulting in difficulty with ADLs and functional activities  He demonstrates deficits in ROM, flexibility, and strength as anticipated post-operatively  He was educated on phases of healing and progression of rehabilitation according to surgical protocol  He verbalized understanding  He was agreeable to POC 2x/week  Impairments: abnormal muscle firing, abnormal or restricted ROM, abnormal movement, activity intolerance, impaired physical strength, lacks appropriate home exercise program, pain with function, scapular dyskinesis and poor posture   Understanding of Dx/Px/POC: excellent  Goals  ST  Decrease pain to 3/10 at worst in 4 weeks  2  Increase L shoulder PROM flexion ~ 125 in 4 weeks  LT  Able to perform ADLs without difficulty/compensation in 8 weeks  2  Increase L shoulder strength globally 4+/5 in 12 weeks  3  Independent in HEP in 8 weeks       Plan  Patient would benefit from: skilled physical therapy  Planned modality interventions: cryotherapy and TENS  Planned therapy interventions: flexibility, home exercise program, functional ROM exercises, therapeutic exercise, therapeutic activities, stretching, strengthening, patient education, postural training, neuromuscular re-education, motor coordination training, Marquez taping, manual therapy and joint mobilization  Frequency: 2x week  Duration in weeks: 12  Treatment plan discussed with: patient and referring physician        Subjective Evaluation    History of Present Illness  Mechanism of injury: He notes that he started with L shoulder pain gradually, on and off for about 2 years ago  He notes that he slipped on the stairs at work last year and he thinks that aggravated the shoulder  He notes that he had PT for the shoulder at that time  He notes that he would feel better, but then he'd used the arm and the pain would returned  He notes that he did have an MRI in  which revealed a tear  Because [place] was [place] out of the [place] they gave him a cortisone injection  He then scheduled surgery for October  He notes that he had surgery on 10/14/19  He notes that he has been compliant with sling wear  He notes that he does get tired in the hand and elbow  He notes difficulty with shower/dressing  He notes that he has driven short distances with seat belt under the sling  He notes that the incision is well healed  He notes that he has been sleeping in the recliner with the sling  He notes that he did try to sleep in his bed with sling  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  He is scheduled to return to work after 6 weeks pending physician approval  He notes that he drives 50 miles to work  He notes that he likes to travel  Pain  Current pain ratin  At best pain ratin  At worst pain ratin  Location: L elbow/bicep, L anterior shoulder   Quality: sharp (tingling)  Relieving factors: ice and medications  Progression: no change      Diagnostic Tests  MRI studies: abnormal  Treatments  Previous treatment: physical therapy  Current treatment: physical therapy  Patient Goals  Patient goals for therapy: decreased pain, increased motion, increased strength, independence with ADLs/IADLs and return to work  Patient goal: have my arm function normal         Objective     Static Posture     Comments  Incision clean/dry/intact      Patient presents in abduction sling    Active Range of Motion     Right Shoulder   Flexion: 155 degrees   Abduction: 170 degrees   External rotation BTH: C7   Internal rotation BTB: L5     Additional Active Range of Motion Details  L AROM assessment deferred due to phases of healing and surgical protocol  Strength/Myotome Testing     Right Shoulder   Normal muscle strength    Additional Strength Details  L shoulder strength assessment deferred due to phases of healing and surgical protocol          Diagnosis: S/p L RTC repair (DOS 10/14/19)   Precautions: NO passive abduction (6 wk), no ER in ABD (6 wk), Flx to  (125 by end of wk 4)   Manual Therapy 10/28/19                                               Exercise Diary  10/28/19       Pendulums        Elbow AROM        Scapular retractions                                                                                                                                                Modalities

## 2019-10-30 ENCOUNTER — OFFICE VISIT (OUTPATIENT)
Dept: PHYSICAL THERAPY | Facility: CLINIC | Age: 54
End: 2019-10-30
Payer: COMMERCIAL

## 2019-10-30 DIAGNOSIS — Z98.890 S/P LEFT ROTATOR CUFF REPAIR: Primary | ICD-10-CM

## 2019-10-30 PROCEDURE — 97110 THERAPEUTIC EXERCISES: CPT | Performed by: PHYSICAL THERAPIST

## 2019-10-30 PROCEDURE — 97140 MANUAL THERAPY 1/> REGIONS: CPT | Performed by: PHYSICAL THERAPIST

## 2019-10-30 PROCEDURE — 97112 NEUROMUSCULAR REEDUCATION: CPT | Performed by: PHYSICAL THERAPIST

## 2019-10-30 NOTE — PROGRESS NOTES
Daily Note     Today's date: 10/30/2019  Patient name: Landy Yao  : 1965  MRN: [id number]  Referring provider: Winsome Ambrosio MD  Dx:   Encounter Diagnosis     ICD-10-CM    1  S/P left rotator cuff repair Z98 890                   Subjective: Patient notes that he hasn't been sleeping well in the morning due to being anxious about being home and not working  He notes mild discomfort in the posterior shoulder  Objective: See treatment diary below      Assessment: Tolerated treatment well  Patient would benefit from continued PT  He tolerated PROM well without any resistance detected in tissues at this range  He was stretched within surgical protocol  He was educated on elbow, wrist AROM and  strengthening permitted  He responded well to upper trap stretch  He would benefit from continued PT to restore appropriate PROM  Plan: Continue per plan of care  Trial wrist supination/pronation as able        Diagnosis: S/p L RTC repair (DOS 10/14/19)   Precautions: NO passive abduction (6 wk), no ER in ABD (6 wk), Flx to  (125 by end of wk 4)   Manual Therapy 10/28/19 10/30/19      PROM per protocol  15' RS                                      Exercise Diary  10/28/19 10/30/19      Pendulums  10x a/p, medial, lateral      Elbow AROM  20x      Scapular retractions  5"x20      Gripper  2' Red      Wrist Flx/Ext  Red 20xea      Wrist Pro/Sup        Upper Trap Stretch  10x10"                                      TB row on R, scapular retraction in sling on L                                                                        Modalities  10/30/19      CP  10'

## 2019-11-05 ENCOUNTER — OFFICE VISIT (OUTPATIENT)
Dept: PHYSICAL THERAPY | Facility: CLINIC | Age: 54
End: 2019-11-05
Payer: COMMERCIAL

## 2019-11-05 DIAGNOSIS — Z98.890 S/P LEFT ROTATOR CUFF REPAIR: Primary | ICD-10-CM

## 2019-11-05 PROCEDURE — 97110 THERAPEUTIC EXERCISES: CPT | Performed by: PHYSICAL THERAPIST

## 2019-11-05 PROCEDURE — 97112 NEUROMUSCULAR REEDUCATION: CPT | Performed by: PHYSICAL THERAPIST

## 2019-11-05 PROCEDURE — 97140 MANUAL THERAPY 1/> REGIONS: CPT | Performed by: PHYSICAL THERAPIST

## 2019-11-05 NOTE — PROGRESS NOTES
Daily Note     Today's date: 2019  Patient name: Cesar Adair  : 1965  MRN: [id number]  Referring provider: Flaquita Bello MD  Dx:   Encounter Diagnosis     ICD-10-CM    1  S/P left rotator cuff repair Z98 890                   Subjective: He reports increased soreness and pain at the end of the day, but he uses ice and it helps  Objective: See treatment diary below      Assessment: Tolerated treatment well  Patient would benefit from continued PT  He demonstrated improved technique with pendulums this date, but did require cues to avoid active shoulder movement vs  Passive movement via momentum  He had increased tolerance of shoulder extension to neutral while lying supine this date  He demonstrated improved PROM L shoulder flexion this date  He required verbal cues to avoid elbow flexion with resisted wrist flexion/extension this date  He did require cues for slow controlled movements with wrist pronation/supination  His progression remains limited due to phases of healing and surgical protocol  Plan: Continue per plan of care  Progress PROM as able per surgical protocol        Diagnosis: S/p L RTC repair (DOS 10/14/19)   Precautions: NO passive abduction (6 wk), no ER in ABD (6 wk), Flx to  (125 by end of wk 4)   Manual Therapy 10/28/19 10/30/19 11/5/19     PROM per protocol  15' RS 15' RS                                     Exercise Diary  10/28/19 10/30/19 11/5/19     Pendulums  10x a/p, medial, lateral 10xea     Elbow AROM  20x 20x      Scapular retractions  5"x20 5"x20     Gripper  2' Red 2' Red     Wrist Flx/Ext  Red 20xea Red 20xea     Wrist Pro/Sup   Red therabar 20xea     Upper Trap Stretch  10x10" 10x10"                                     TB row on R, scapular retraction in sling on L                                                                        Modalities  10/30/19 11/5/19     CP  10'  10'

## 2019-11-07 ENCOUNTER — OFFICE VISIT (OUTPATIENT)
Dept: PHYSICAL THERAPY | Facility: CLINIC | Age: 54
End: 2019-11-07
Payer: COMMERCIAL

## 2019-11-07 DIAGNOSIS — Z98.890 S/P LEFT ROTATOR CUFF REPAIR: Primary | ICD-10-CM

## 2019-11-07 PROCEDURE — 97140 MANUAL THERAPY 1/> REGIONS: CPT | Performed by: PHYSICAL THERAPIST

## 2019-11-07 PROCEDURE — 97110 THERAPEUTIC EXERCISES: CPT | Performed by: PHYSICAL THERAPIST

## 2019-11-07 NOTE — PROGRESS NOTES
Daily Note     Today's date: 2019  Patient name: Rob Shin  : 1965  MRN: [id number]  Referring provider: Janeen Wellington MD  Dx:   Encounter Diagnosis     ICD-10-CM    1  S/P left rotator cuff repair Z98 890                   Subjective: Patient notes that he has some soreness at the end of the day which improves with ice  Objective: See treatment diary below      Assessment: Tolerated treatment well  Patient would benefit from continued PT  He demonstrates well maintained gains in PROM  He remains limited in progression due to phases of healing and surgical protocol  He was able to perform pendulums this date with only momentum vs  Attempts at active movement this date  He is appropriate for initiation of submaximal isometric next visit per phases of healing and surgical protocol  Plan: Continue per plan of care  Trial wall 4-way isometrics        Diagnosis: S/p L RTC repair (DOS 10/14/19)   Precautions: NO passive abduction (6 wk), no ER in ABD (6 wk), Flx to  (125 by end of wk 4)   Manual Therapy 10/28/19 10/30/19 11/5/19 11/7/19    PROM per protocol  15' RS 15' RS 10' RS                                    Exercise Diary  10/28/19 10/30/19 11/5/19 11/7/19    Pendulums  10x a/p, medial, lateral 10xea 10xea    Elbow AROM  20x 20x  20x    Scapular retractions  5"x20 5"x20 5"x20    Gripper  2' Red 2' Red     Wrist Flx/Ext  Red 20xea Red 20xea Grn 20xea    Wrist Pro/Sup   Red therabar 20xea Red therabar 20xea    Upper Trap Stretch  10x10" 10x10" 10x10"                                    TB row on R, scapular retraction in sling on L                                                                        Modalities  10/30/19 11/5/19 11/7/19    CP  10'  10'

## 2019-11-11 ENCOUNTER — OFFICE VISIT (OUTPATIENT)
Dept: PHYSICAL THERAPY | Facility: CLINIC | Age: 54
End: 2019-11-11
Payer: COMMERCIAL

## 2019-11-11 DIAGNOSIS — Z98.890 S/P LEFT ROTATOR CUFF REPAIR: Primary | ICD-10-CM

## 2019-11-11 PROCEDURE — 97110 THERAPEUTIC EXERCISES: CPT | Performed by: PHYSICAL THERAPIST

## 2019-11-11 PROCEDURE — 97112 NEUROMUSCULAR REEDUCATION: CPT | Performed by: PHYSICAL THERAPIST

## 2019-11-11 PROCEDURE — 97140 MANUAL THERAPY 1/> REGIONS: CPT | Performed by: PHYSICAL THERAPIST

## 2019-11-11 NOTE — PROGRESS NOTES
Daily Note     Today's date: 2019  Patient name: Davon Melendez  : 1965  MRN: [id number]  Referring provider: Michelle Benton MD  Dx:   Encounter Diagnosis     ICD-10-CM    1  S/P left rotator cuff repair Z98 890                   Subjective: Patient reports that he had some soreness yesterday which was better with ice  Objective: See treatment diary below      Assessment: Tolerated treatment well  Patient would benefit from continued PT  He did tolerate progression of PROM L shoulder flexion, but required cues to decrease muscle activation and guarding  He tolerates active AROM elbow in standing vs  Sitting due to stretch at terminal extension of elbow  He was challenged with initiation of submaximal isometrics this date, but had no increase in subjective report of pain  He remains limited in progression of rehab per phases of healing and surgical protocol  Plan: Continue per plan of care         Diagnosis: S/p L RTC repair (DOS 10/14/19)   Precautions: NO passive abduction (6 wk), no ER in ABD (6 wk), Flx to  (125 by end of wk 4)   Manual Therapy 10/28/19 10/30/19 11/5/19 11/7/19 11/11/19   PROM per protocol  15' RS 15' RS 10' RS 15' RS                                   Exercise Diary  10/28/19 10/30/19 11/5/19 11/7/19 11/11/19   Pendulums  10x a/p, medial, lateral 10xea 10xea    Elbow AROM  20x 20x  20x 20x   Scapular retractions  5"x20 5"x20 5"x20 5"x20   Gripper  2' Red 2' Red     Wrist Flx/Ext  Red 20xea Red 20xea Grn 20xea    Wrist Pro/Sup   Red therabar 20xea Red therabar 20xea    Upper Trap Stretch  10x10" 10x10" 10x10" 10x10"                           Wall isometric 4-way     Sub max at punching bag 5"x10ea   TB row on R, scapular retraction in sling on L                                                                        Modalities  10/30/19 11/5/19 11/7/19 11/11/19   CP  10'  10' 10' 10'

## 2019-11-13 ENCOUNTER — APPOINTMENT (OUTPATIENT)
Dept: PHYSICAL THERAPY | Facility: CLINIC | Age: 54
End: 2019-11-13
Payer: COMMERCIAL

## 2019-11-14 ENCOUNTER — OFFICE VISIT (OUTPATIENT)
Dept: PHYSICAL THERAPY | Facility: CLINIC | Age: 54
End: 2019-11-14
Payer: COMMERCIAL

## 2019-11-14 DIAGNOSIS — Z98.890 S/P LEFT ROTATOR CUFF REPAIR: Primary | ICD-10-CM

## 2019-11-14 PROCEDURE — 97110 THERAPEUTIC EXERCISES: CPT | Performed by: PHYSICAL MEDICINE & REHABILITATION

## 2019-11-14 PROCEDURE — 97140 MANUAL THERAPY 1/> REGIONS: CPT | Performed by: PHYSICAL MEDICINE & REHABILITATION

## 2019-11-14 NOTE — PROGRESS NOTES
Daily Note     Today's date: 2019  Patient name: Kat Mcgowan  : 1965  MRN: [id number]  Referring provider: Iveth Dobbs MD  Dx:   Encounter Diagnosis     ICD-10-CM    1  S/P left rotator cuff repair Z98 890                   Subjective: Patient reports increased soreness today of unknown origin  Objective: See treatment diary below      Assessment: Tolerated treatment well  No complaints of pain with intervention as charted  Firm end feel with flexion to 90 today  Patient would benefit from continued PT  Will continue per patient tolerance and protocol  Plan: Continue per plan of care         Diagnosis: S/p L RTC repair (DOS 10/14/19)   Precautions: NO passive abduction (6 wk), no ER in ABD (6 wk), Flx to  (125 by end of wk 4)   Manual Therapy 19   PROM per protocol LH  15' RS 10' RS 15' RS                                   Exercise Diary  19   Pendulums 20x m/l, a/p  10xea 10xea    Elbow AROM 20x  20x  20x 20x   Scapular retractions 20x5"  5"x20 5"x20 5"x20   Gripper   2' Red     Wrist Flx/Ext   Red 20xea Grn 20xea    Wrist Pro/Sup   Red therabar 20xea Red therabar 20xea    Upper Trap Stretch 10x10"  10x10" 10x10" 10x10"                           Wall isometric 4-way Submax at punching bag 10x5" ea    Sub max at punching bag 5"x10ea   TB row on R, scapular retraction in sling on L                                                                        Modalities 19   CP 10' post  10' 10' 10'

## 2019-11-18 ENCOUNTER — TELEPHONE (OUTPATIENT)
Dept: GASTROENTEROLOGY | Facility: AMBULARY SURGERY CENTER | Age: 54
End: 2019-11-18

## 2019-11-19 ENCOUNTER — TELEPHONE (OUTPATIENT)
Dept: GASTROENTEROLOGY | Facility: AMBULARY SURGERY CENTER | Age: 54
End: 2019-11-19

## 2019-11-19 ENCOUNTER — OFFICE VISIT (OUTPATIENT)
Dept: PHYSICAL THERAPY | Facility: CLINIC | Age: 54
End: 2019-11-19
Payer: COMMERCIAL

## 2019-11-19 DIAGNOSIS — Z98.890 S/P LEFT ROTATOR CUFF REPAIR: Primary | ICD-10-CM

## 2019-11-19 DIAGNOSIS — Z86.010 HISTORY OF ADENOMATOUS POLYP OF COLON: Primary | ICD-10-CM

## 2019-11-19 PROCEDURE — 97140 MANUAL THERAPY 1/> REGIONS: CPT | Performed by: PHYSICAL THERAPIST

## 2019-11-19 PROCEDURE — 97110 THERAPEUTIC EXERCISES: CPT | Performed by: PHYSICAL THERAPIST

## 2019-11-19 PROCEDURE — 97112 NEUROMUSCULAR REEDUCATION: CPT | Performed by: PHYSICAL THERAPIST

## 2019-11-19 NOTE — PROGRESS NOTES
Daily Note     Today's date: 2019  Patient name: Rob Shin  : 1965  MRN: [id number]  Referring provider: Janeen Wellington MD  Dx:   Encounter Diagnosis     ICD-10-CM    1  S/P left rotator cuff repair Z98 890                   Subjective: Patient reports that he's doing better  He notes that he is able to use L arm some what for showering  He notes that he has his follow-up with surgeon tomorrow  He notes that he wants to go back to work next week  Objective: See treatment diary below      Assessment: Tolerated treatment well  Patient would benefit from continued PT  He tolerated initiation of AAROM with cane press and flexion this date in supine  He had no increase in subjective report of pain  He demonstrated increased flexion ROM this date  He had increased L shoulder flexion in hooklying vs  Supine due to lat restriction  He had no difficulty with initiation of posterior capsule stretch  He was challenged with scapular retraction, requiring tactile cues to avoid rowing and upper trap compensation  Plan: Continue per plan of care  Progress AAROM per protocol  Re-evaluation next visit        Diagnosis: S/p L RTC repair (DOS 10/14/19)   Precautions: NO passive abduction (6 wk), no ER in ABD (6 wk), Flx to  (125 by end of wk 4)   Manual Therapy 19   PROM per protocol LH 15' RS 15' RS 10' RS 15' RS                                   Exercise Diary  19   Pendulums 20x m/l, a/p  10xea 10xea    Elbow AROM 20x  20x  20x 20x   Scapular retractions 20x5"  5"x20 5"x20 5"x20   Gripper   2' Red     Wrist Flx/Ext   Red 20xea Grn 20xea    Wrist Pro/Sup   Red therabar 20xea Red therabar 20xea    Upper Trap Stretch 10x10"  10x10" 10x10" 10x10"   Supine cane press/flexion  10xea      Posterior capsule stretch  10x10"      Bent over table supported scapular retraction  5"x10              Wall isometric 4-way Submax at punching bag 10x5" ea    Sub max at punching bag 5"x10ea   TB row on R, scapular retraction in sling on L                                                                        Modalities 11/14 11/19/19 11/5/19 11/7/19 11/11/19   CP 10' post 10'  10' 10' 10'

## 2019-11-19 NOTE — TELEPHONE ENCOUNTER
Called pt to offer Saturday dec 7th for colonoscopy w/ dr Orlando Obregon (pt requested a Saturday) pt said he will call me back

## 2019-11-19 NOTE — TELEPHONE ENCOUNTER
Pt is now on the schedule for 12/7/2019 w/ dr Jus Tejada at [facility]/suprep ordered/instructions emailed

## 2019-11-20 ENCOUNTER — TELEPHONE (OUTPATIENT)
Dept: OBGYN CLINIC | Facility: CLINIC | Age: 54
End: 2019-11-20

## 2019-11-20 ENCOUNTER — OFFICE VISIT (OUTPATIENT)
Dept: OBGYN CLINIC | Facility: CLINIC | Age: 54
End: 2019-11-20

## 2019-11-20 VITALS
DIASTOLIC BLOOD PRESSURE: 68 MMHG | WEIGHT: 184.6 LBS | BODY MASS INDEX: 27.98 KG/M2 | HEART RATE: 69 BPM | SYSTOLIC BLOOD PRESSURE: 111 MMHG | HEIGHT: 68 IN

## 2019-11-20 DIAGNOSIS — Z98.890 STATUS POST ARTHROSCOPY OF LEFT SHOULDER: Primary | ICD-10-CM

## 2019-11-20 PROCEDURE — 99024 POSTOP FOLLOW-UP VISIT: CPT | Performed by: ORTHOPAEDIC SURGERY

## 2019-11-20 NOTE — LETTER
November 20, 2019     Patient: Hugh Tovar   YOB: 1965   Date of Visit: 11/20/2019       To Whom it May Concern:    Hugh Tovar is under my professional care  He was seen in my office on 11/20/2019  He may return to work on 11/25/2019       If you have any questions or concerns, please don't hesitate to call           Sincerely,          Lady Douglas MD        CC: No Recipients

## 2019-11-20 NOTE — PROGRESS NOTES
Assessment/Plan:  1  Status post arthroscopy of left shoulder         Scribe Attestation    I,:   Jayce Mireles Call am acting as a scribe while in the presence of the attending physician :        I,:   Wily Maxwell MD personally performed the services described in this documentation    as scribed in my presence :              [place] is doing well  I am agreeable to him returning to work on Monday  A note was provided  He will now begin to wean himself out of the sling  I did encourage him to continue to use it in crowded situations  I explained he will now transition into next stage of his protocol  His therapist will work with him performing active and assistive range of motion slowly and very light strength training  He will follow up with me in 6 weeks  Subjective:   Dale Davidson is a 53 y o  male who presents to the office today for postop evaluation of the left shoulder arthroscopic supraspinatus tendon repair and subacromial decompression performed just over 5 weeks ago  He states he is doing well  He has no complaints of excessive pain  He states physical therapy has begun active assistive range of motion exercises which has made him somewhat more sore  He states he understands that this is necessary progression  He is sleeping well and has a normal appetite  He is eager to return to work  He is a teacher for the [employer]      Past Medical History:   Diagnosis Date    Dermatitis     Resolved 3/31/2016     Dyshidrosis     Resolved 3/31/2016        Past Surgical History:   Procedure Laterality Date    COLONOSCOPY      [place] SHLDR ARTHROSCOP,SURG,W/ROTAT CUFF REPR Left 10/14/2019    Procedure: SHOULDER ARTHROSCOPY, ROTATOR CUFF REPAIR AND SUBACROMIAL DECOMPRESSION;  Surgeon: Wily Maxwell MD;  [facility]: [facility];  Service: Orthopedics    SKIN BIOPSY      VASECTOMY         Family History   Problem Relation Age of Onset    Arthritis Mother    [facility] Deep vein thrombosis Mother     Prostate cancer Father    Yolanda Tran Other Son         Blurring vision  Vision problems due to auto-immune   No Known Problems Sister     No Known Problems Brother     No Known Problems Maternal Aunt     No Known Problems Maternal Uncle     No Known Problems Paternal Aunt     No Known Problems Paternal Uncle     No Known Problems Maternal Grandmother     No Known Problems Maternal Grandfather     No Known Problems Paternal Grandmother     No Known Problems Paternal Grandfather     Mental illness Neg Hx        Social History     Occupational History    Not on file   Tobacco Use    Smoking status: Never Smoker    Smokeless tobacco: Never Used   Substance and Sexual Activity    Alcohol use: Never     Frequency: Never    Drug use: Never    Sexual activity: Not on file         Current Outpatient Medications:     acetaminophen (TYLENOL) 325 mg tablet, Take 650 mg by mouth every 6 (six) hours as needed for mild pain, Disp: , Rfl:     Na Sulfate-K Sulfate-Mg Sulf (SUPREP BOWEL PREP KIT) 17 5-3 13-1 6 GM/177ML SOLN, Take 2 Bottles by mouth see administration instructions Suprep bowel prep  Please follow the instructions from the office, Disp: 2 Bottle, Rfl: 0    sildenafil (VIAGRA) 100 mg tablet, TK 1 T PO PRN, Disp: , Rfl: 0    No Known Allergies    Objective:  Vitals:    11/20/19 0853   BP: 111/68   Pulse: 69       Left Shoulder Exam     Range of Motion   Forward flexion: 90     Other   Erythema: absent  Scars: present  Sensation: normal  Pulse: present (2+ radial)     Comments:  Surgical incisions are well healed and benign in appearance  Normal strength in the hand intrinsics  Physical Exam    I have personally reviewed pertinent films in PACS and my interpretation is as follows: No images reviewed today

## 2019-11-21 ENCOUNTER — EVALUATION (OUTPATIENT)
Dept: PHYSICAL THERAPY | Facility: CLINIC | Age: 54
End: 2019-11-21
Payer: COMMERCIAL

## 2019-11-21 DIAGNOSIS — Z98.890 S/P LEFT ROTATOR CUFF REPAIR: Primary | ICD-10-CM

## 2019-11-21 PROCEDURE — 97140 MANUAL THERAPY 1/> REGIONS: CPT | Performed by: PHYSICAL THERAPIST

## 2019-11-21 PROCEDURE — 97110 THERAPEUTIC EXERCISES: CPT | Performed by: PHYSICAL THERAPIST

## 2019-11-21 NOTE — PROGRESS NOTES
PT Re-Evaluation     Today's date: 2019  Patient name: Gerhardt Cords  : 1965  MRN: [id number]  Referring provider: Floridalma Macario MD  Dx:   Encounter Diagnosis     ICD-10-CM    1  S/P left rotator cuff repair Z98 890                   Assessment  Assessment details: Patient is a 46year old male status post L rotator cuff repair on 10/14/19 resulting in difficulty with ADLs and functional activities  He demonstrates improved PROM and AAROM at this time; AROM assessment was deferred as patient is not yet permitted for AROM at this time due to integrity of tissues and phases of healing  He is appropriate for weaning from sling and increased functional use of LUE at waist level  He has been cleared to return to work, but was 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  Her verbalized understanding  He would benefit from continued skilled PT to progress AAROM and initiate AROM and strengthening as appropriate  Impairments: abnormal muscle firing, abnormal or restricted ROM, abnormal movement, activity intolerance, impaired physical strength, lacks appropriate home exercise program, pain with function, scapular dyskinesis and poor posture   Understanding of Dx/Px/POC: excellent  Goals  ST  Decrease pain to 3/10 at worst in 4 weeks  - Progressing  2  Increase L shoulder PROM flexion ~ 125 in 4 weeks  - MET  3  Increase L shoulder AROM flexion ~120 degrees in 4 weeks  LT  Able to perform ADLs without difficulty/compensation in 8 weeks  - Progressing  2  Increase L shoulder strength globally 4+/5 in 12 weeks  - Not met  3  Independent in HEP in 8 weeks   - Not met    Plan  Patient would benefit from: skilled physical therapy  Planned modality interventions: cryotherapy and TENS  Planned therapy interventions: flexibility, home exercise program, functional ROM exercises, therapeutic exercise, therapeutic activities, stretching, strengthening, patient education, postural training, neuromuscular re-education, motor coordination training, Marquez taping, manual therapy and joint mobilization  Frequency: 2x week  Duration in weeks: 8  Treatment plan discussed with: patient and referring physician        Subjective Evaluation    Pain  Current pain ratin  At best pain ratin  At worst pain ratin  Location: L elbow/bicep, L anterior shoulder   Quality: soreness  Relieving factors: ice and medications  Progression: improved      Diagnostic Tests  MRI studies: abnormal  Treatments  Previous treatment: physical therapy  Current treatment: physical therapy  Patient Goals  Patient goals for therapy: decreased pain, increased motion, increased strength, independence with ADLs/IADLs and return to work  Patient goal: have my arm function normal     Patient reports 70% improvement since start of PT  He notes that he is able to use his L arm more for showering/ADLs and gentle every day activities  He notes that he does have soreness after waking and with exercises  He notes that he has increased pain with cold weather  He notes that he is returning to work on Monday  He reports that he had follow-up with physician this week who cleared him to return, wean from sling, and progress AAROM and AROM  Objective     Static Posture     Comments  Incision clean/dry/intact  Patient resting with LUE more comfortably without arm in sling with arm resting at side  Active Range of Motion     Right Shoulder   Flexion: 155 degrees   Abduction: 170 degrees   External rotation BTH: C7   Internal rotation BTB: L5     Additional Active Range of Motion Details  L AROM assessment deferred due to phases of healing and surgical protocol         L AAROM flexion with cane 125 degrees  L AAROM flexion with pulleys 135 degrees    Strength/Myotome Testing     Right Shoulder   Normal muscle strength    Additional Strength Details  L shoulder strength assessment deferred due to phases of healing and surgical protocol        Flowsheet Rows      Most Recent Value   PT/OT G-Codes   Current Score  52   Projected Score  73   FOTO information reviewed  Yes        Diagnosis: S/p L RTC repair (DOS 10/14/19)   Precautions: NO passive abduction (6 wk), no ER in ABD (6 wk), Flx to  (125 by end of wk 4)   Manual Therapy 11/14/19 11/19/19 11/21/19 11/7/19 11/11/19   PROM per protocol LH 15' RS 15' RS 10' RS 15' RS                           Re-Evaluation   10' RS     Exercise Diary  11/14/19 11/19/19 11/21/19 11/7/19 11/11/19   Pulleys   3'     Pendulums 20x m/l, a/p   10xea    Elbow AROM 20x   20x 20x   Scapular retractions 20x5"   5"x20 5"x20   Gripper        Wrist Flx/Ext    Grn 20xea    Wrist Pro/Sup    Red therabar 20xea    Upper Trap Stretch 10x10"   10x10" 10x10"   Supine cane press/flexion  10xea 10xea +ER     Posterior capsule stretch  10x10"      Bent over table supported scapular retraction  5"x10              Wall isometric 4-way Submax at punching bag 10x5" ea    Sub max at punching bag 5"x10ea   TB row on R, scapular retraction in sling on L                                                                        Modalities 11/14 11/19/19 11/21/19 11/7/19 11/11/19   CP 10' post 10'  10' 10' 10'

## 2019-11-25 ENCOUNTER — APPOINTMENT (OUTPATIENT)
Dept: PHYSICAL THERAPY | Facility: CLINIC | Age: 54
End: 2019-11-25
Payer: COMMERCIAL

## 2019-11-25 ENCOUNTER — OFFICE VISIT (OUTPATIENT)
Dept: PHYSICAL THERAPY | Facility: CLINIC | Age: 54
End: 2019-11-25
Payer: COMMERCIAL

## 2019-11-25 DIAGNOSIS — Z98.890 S/P LEFT ROTATOR CUFF REPAIR: Primary | ICD-10-CM

## 2019-11-25 PROCEDURE — 97140 MANUAL THERAPY 1/> REGIONS: CPT | Performed by: PHYSICAL THERAPIST

## 2019-11-25 PROCEDURE — 97110 THERAPEUTIC EXERCISES: CPT | Performed by: PHYSICAL THERAPIST

## 2019-11-25 NOTE — PROGRESS NOTES
Daily Note     Today's date: 2019  Patient name: Elana Stahl  : 1965  MRN: [id number]  Referring provider: Chanell Leon MD  Dx:   Encounter Diagnosis     ICD-10-CM    1  S/P left rotator cuff repair Z98 890        Start Time: 221  Stop Time:   Total time in clinic (min): 44 minutes    Subjective: Patient notes slight increase in soreness this date  He notes that he returned to work today, so he wore his sling as protection and warning to others  He thinks that resulted in increased soreness  Objective: See treatment diary below      Assessment: Tolerated treatment well  Patient would benefit from continued PT  He tolerated initiation of abduction this date as he was permitted secondary to phases of healing and surgical protocol  He tolerated initiation of cane abduction this date  His session was shortened due to patient arriving late due to traffic  He did demonstrate upper trap compensation with scapular retraction this date, requiring cues  He does maintain gains in PROM well  Plan: Continue per plan of care  Trial sidelying AROM as able        Diagnosis: S/p L RTC repair (DOS 10/14/19)   Precautions: NO passive abduction (6 wk ), no ER in ABD (6 wk ), Flx to  (125 by end of wk 4)   Manual Therapy 19   PROM per protocol LH 15' RS 10' RS +abd 10' RS 15' RS                                   Exercise Diary  19   Pulleys   3' flexion     Pendulums 20x m/l, a/p   10xea    Elbow AROM 20x   20x 20x   Scapular retractions 20x5"   5"x20 5"x20   Gripper        Wrist Flx/Ext    Grn 20xea    Wrist Pro/Sup    Red therabar 20xea    Upper Trap Stretch 10x10"   10x10" 10x10"   Supine cane press/flexion  10xea flx 10x  ER 10x  +abd 5x     Posterior capsule stretch  10x10" 10x10"     Bent over table supported scapular retraction  5"x10 5"X10             Wall isometric 4-way Submax at punching bag 10x5" ea    Sub max at punching bag 5"x10ea   TB row on R, scapular retraction in sling on L                                                                        Modalities 11/14 11/19/19 11/25/19 11/7/19 11/11/19   CP 10' post 10'  10'  10' 10'

## 2019-11-27 ENCOUNTER — APPOINTMENT (OUTPATIENT)
Dept: PHYSICAL THERAPY | Facility: CLINIC | Age: 54
End: 2019-11-27
Payer: COMMERCIAL

## 2019-11-29 ENCOUNTER — OFFICE VISIT (OUTPATIENT)
Dept: PHYSICAL THERAPY | Facility: CLINIC | Age: 54
End: 2019-11-29
Payer: COMMERCIAL

## 2019-11-29 DIAGNOSIS — Z98.890 S/P LEFT ROTATOR CUFF REPAIR: Primary | ICD-10-CM

## 2019-11-29 PROCEDURE — 97110 THERAPEUTIC EXERCISES: CPT

## 2019-11-29 PROCEDURE — 97112 NEUROMUSCULAR REEDUCATION: CPT

## 2019-11-29 PROCEDURE — 97140 MANUAL THERAPY 1/> REGIONS: CPT

## 2019-11-29 NOTE — PROGRESS NOTES
Daily Note     Today's date: 2019  Patient name: Landy Marcelo  : 1965  MRN: [id number]  Referring provider: Chioma Boggs MD  Dx:   Encounter Diagnosis     ICD-10-CM    1  S/P left rotator cuff repair Z98 890                   Subjective: Patient states that his shoulder is feeling good, most of his pain is in his bicep  1:1 45 min      Objective: See treatment diary below            Manual Therapy 19   PROM per protocol LH 15' RS 10' RS +abd x10' HS 10' RS 15' RS                                       Exercise Diary  19   Pulleys   3' flexion 5'     Pendulums 20x m/l, a/p    10xea    Elbow AROM 20x    20x 20x   Scapular retractions 20x5"    5"x20 5"x20   Gripper         Wrist Flx/Ext     Grn 20xea    Wrist Pro/Sup     Red therabar 20xea    Upper Trap Stretch 10x10"    10x10" 10x10"   Supine cane press/flexion  10xea flx 10x  ER 10x  +abd 5x flx x10  ER x10   Abd x10     Posterior capsule stretch  10x10" 10x10"      Bent over table supported scapular retraction  5"x10 5"X10 5"x10              Wall isometric 4-way Submax at punching bag 10x5" ea     Sub max at punching bag 5"x10ea   TB row on R, scapular retraction in sling on L         sidelying AROM flex/ER    2x10                                                                       Modalities 19   CP 10' post 10'  10'  10' 10' 10'                                       Assessment: Pt ROM shows progress, some muscle guarding  Pt did well with sidelying ER and flex  Plan: Continue per plan of care  Trial sidelying AROM as able

## 2019-12-03 ENCOUNTER — APPOINTMENT (OUTPATIENT)
Dept: PHYSICAL THERAPY | Facility: CLINIC | Age: 54
End: 2019-12-03
Payer: COMMERCIAL

## 2019-12-05 ENCOUNTER — APPOINTMENT (OUTPATIENT)
Dept: PHYSICAL THERAPY | Facility: CLINIC | Age: 54
End: 2019-12-05
Payer: COMMERCIAL

## 2019-12-05 ENCOUNTER — ANESTHESIA EVENT (OUTPATIENT)
Dept: GASTROENTEROLOGY | Facility: HOSPITAL | Age: 54
End: 2019-12-05

## 2019-12-07 ENCOUNTER — ANESTHESIA (OUTPATIENT)
Dept: GASTROENTEROLOGY | Facility: HOSPITAL | Age: 54
End: 2019-12-07

## 2019-12-07 ENCOUNTER — HOSPITAL ENCOUNTER (OUTPATIENT)
Dept: GASTROENTEROLOGY | Facility: HOSPITAL | Age: 54
Setting detail: OUTPATIENT SURGERY
Discharge: HOME | End: 2019-12-07
Attending: INTERNAL MEDICINE
Payer: COMMERCIAL

## 2019-12-07 VITALS
SYSTOLIC BLOOD PRESSURE: 128 MMHG | TEMPERATURE: 97 F | DIASTOLIC BLOOD PRESSURE: 78 MMHG | OXYGEN SATURATION: 98 % | RESPIRATION RATE: 16 BRPM | HEART RATE: 78 BPM

## 2019-12-07 DIAGNOSIS — Z86.010 HX OF COLONIC POLYPS: ICD-10-CM

## 2019-12-07 PROCEDURE — G0105 COLORECTAL SCRN; HI RISK IND: HCPCS | Performed by: INTERNAL MEDICINE

## 2019-12-07 RX ORDER — PROPOFOL 10 MG/ML
INJECTION, EMULSION INTRAVENOUS AS NEEDED
Status: DISCONTINUED | OUTPATIENT
Start: 2019-12-07 | End: 2019-12-07 | Stop reason: SURG

## 2019-12-07 RX ORDER — LIDOCAINE HYDROCHLORIDE 10 MG/ML
INJECTION, SOLUTION INFILTRATION; PERINEURAL AS NEEDED
Status: DISCONTINUED | OUTPATIENT
Start: 2019-12-07 | End: 2019-12-07 | Stop reason: SURG

## 2019-12-07 RX ORDER — SODIUM CHLORIDE, SODIUM LACTATE, POTASSIUM CHLORIDE, CALCIUM CHLORIDE 600; 310; 30; 20 MG/100ML; MG/100ML; MG/100ML; MG/100ML
100 INJECTION, SOLUTION INTRAVENOUS CONTINUOUS
Status: DISCONTINUED | OUTPATIENT
Start: 2019-12-07 | End: 2019-12-11 | Stop reason: HOSPADM

## 2019-12-07 RX ADMIN — LIDOCAINE HYDROCHLORIDE 50 MG: 10 INJECTION, SOLUTION INFILTRATION; PERINEURAL at 09:29

## 2019-12-07 RX ADMIN — SODIUM CHLORIDE, SODIUM LACTATE, POTASSIUM CHLORIDE, AND CALCIUM CHLORIDE 100 ML/HR: .6; .31; .03; .02 INJECTION, SOLUTION INTRAVENOUS at 09:13

## 2019-12-07 RX ADMIN — PROPOFOL 30 MG: 10 INJECTION, EMULSION INTRAVENOUS at 09:31

## 2019-12-07 RX ADMIN — PROPOFOL 120 MG: 10 INJECTION, EMULSION INTRAVENOUS at 09:29

## 2019-12-07 NOTE — H&P
History and Physical - [facility]  Willamtamekakojo Yao 53 y o  male MRN: [id number]        HPI:  50-year-old male had large tubulovillous adenoma removed from the transverse colon on the recent colonoscopy few months ago  Regular bowel movements  Historical Information   Past Medical History:   Diagnosis Date    Dermatitis     Resolved 3/31/2016     Dyshidrosis     Resolved 3/31/2016      Past Surgical History:   Procedure Laterality Date    COLONOSCOPY      [place] SHLDR ARTHROSCOP,SURG,W/ROTAT CUFF REPR Left 10/14/2019    Procedure: SHOULDER ARTHROSCOPY, ROTATOR CUFF REPAIR AND SUBACROMIAL DECOMPRESSION;  Surgeon: Elesa Homans, MD;  Location: [facility];  Service: Orthopedics    ROTATOR CUFF REPAIR      SKIN BIOPSY      VASECTOMY       Social History   Social History     Substance and Sexual Activity   Alcohol Use Never    Frequency: Never     Social History     Substance and Sexual Activity   Drug Use Never     Social History     Tobacco Use   Smoking Status Never Smoker   Smokeless Tobacco Never Used     Family History   Problem Relation Age of Onset    Arthritis Mother     Deep vein thrombosis Mother     Prostate cancer Father     Other Son         Blurring vision  Vision problems due to auto-immune   No Known Problems Sister     No Known Problems Brother     No Known Problems Maternal Aunt     No Known Problems Maternal Uncle     No Known Problems Paternal Aunt     No Known Problems Paternal Uncle     No Known Problems Maternal Grandmother     No Known Problems Maternal Grandfather     No Known Problems Paternal Grandmother     No Known Problems Paternal Grandfather     Mental illness Neg Hx        Meds/Allergies       (Not in a hospital admission)    No Known Allergies    Objective     Blood pressure 134/81, pulse 87, temperature 98 2 °F (36 8 °C), resp  rate 16, SpO2 98 %      PHYSICAL EXAM:    Gen: NAD  CV: S1 & S2 normal, RRR  CHEST: Clear to auscultate  ABD: soft, NT/ND, good bowel sounds  EXT: no edema    ASSESSMENT:     History of tubulovillous adenoma    PLAN:    Colonoscopy

## 2019-12-07 NOTE — ANESTHESIA PREPROCEDURE EVALUATION
Review of Systems/Medical History  Patient summary reviewed  Chart reviewed  No history of anesthetic complications     Cardiovascular  Exercise tolerance (METS): >4,  Hyperlipidemia,    Pulmonary  Recent URI (residual cough) ,        GI/Hepatic    Bowel prep       Negative  ROS        Endo/Other  Negative endo/other ROS      GYN       Hematology  Negative hematology ROS      Musculoskeletal  Negative musculoskeletal ROS        Neurology  Negative neurology ROS      Psychology   Negative psychology ROS              Physical Exam    Airway       Dental       Cardiovascular      Pulmonary      Other Findings        Anesthesia Plan  ASA Score- 1     Anesthesia Type- IV sedation with anesthesia with ASA Monitors  Additional Monitors:   Airway Plan:         Plan Factors-    Induction- intravenous  Postoperative Plan-     Informed Consent- Anesthetic plan and risks discussed with patient

## 2019-12-07 NOTE — ANESTHESIA POSTPROCEDURE EVALUATION
Post-Op Assessment Note    CV Status:  Stable    Pain management: adequate     Mental Status:  Alert and awake   Hydration Status:  Euvolemic   PONV Controlled:  Controlled   Airway Patency:  Patent   Post Op Vitals Reviewed: Yes      Staff: Anesthesiologist           /82 (12/07/19 0941)    Temp (!) 97 °F (36 1 °C) (12/07/19 0941)    Pulse 86 (12/07/19 0941)   Resp 18 (12/07/19 0941)    SpO2 100 % (12/07/19 0941)

## 2019-12-10 ENCOUNTER — OFFICE VISIT (OUTPATIENT)
Dept: PHYSICAL THERAPY | Facility: CLINIC | Age: 54
End: 2019-12-10
Payer: COMMERCIAL

## 2019-12-10 DIAGNOSIS — Z98.890 S/P LEFT ROTATOR CUFF REPAIR: Primary | ICD-10-CM

## 2019-12-10 PROCEDURE — 97110 THERAPEUTIC EXERCISES: CPT | Performed by: PHYSICAL THERAPIST

## 2019-12-10 PROCEDURE — 97140 MANUAL THERAPY 1/> REGIONS: CPT | Performed by: PHYSICAL THERAPIST

## 2019-12-10 PROCEDURE — 97112 NEUROMUSCULAR REEDUCATION: CPT | Performed by: PHYSICAL THERAPIST

## 2019-12-10 NOTE — PROGRESS NOTES
Daily Note     Today's date: 12/10/2019  Patient name: Karrie Fox  : 1965  MRN: [id number]  Referring provider: Julio Renteria MD  Dx:   Encounter Diagnosis     ICD-10-CM    1  S/P left rotator cuff repair Z98 890        Start Time: 1700  Stop Time: 4669  Total time in clinic (min): 55 minutes    Subjective: patient reports his shoulder feels okay, no complaints after last session  He mentions he missed last week secondary to being sick  Objective: See treatment diary below      Assessment: Patient tolerated treatment well  He is able to achieve approximately 90 degrees AROM flexion in sidelying prior to onset of discomfort  No discomfort noted with sidelying ER  He was able to increase repetitions for cane ROM  He demonstrates and reports fatigue post treatment  He will benefit from continued physical therapy  Plan: Continue per plan of care        Manual Therapy 11/14/19 11/19/19 11/25/19 11/29 12/10/19 11/11/19   PROM per protocol LH 15' RS 10' RS +abd x10' HS x10' SK 15' RS                                       Exercise Diary  11/14/19 11/19/19 11/25/19 11/29 12/10/19 11/11/19   Pulleys   3' flexion 5' 5'    Pendulums 20x m/l, a/p        Elbow AROM 20x     20x   Scapular retractions 20x5"     5"x20   Gripper         Wrist Flx/Ext         Wrist Pro/Sup         Upper Trap Stretch 10x10"     10x10"   Supine cane press/flexion  10xea flx 10x  ER 10x  +abd 5x flx x10  ER x10   Abd x10 flx x12  ER x12  Abd x12    Posterior capsule stretch  10x10" 10x10"      Bent over table supported scapular retraction  5"x10 5"X10 5"x10 5"x10             Wall isometric 4-way Submax at punching bag 10x5" ea     Sub max at punching bag 5"x10ea   TB row on R, scapular retraction in sling on L         sidelying AROM flex/ER    2x10    2x10ea                                                                   Modalities 11/14 11/19/19 11/25/19 11/29/19 12/10/19 11/11/19   CP 10' post 10'  10'  10' 10' 10'

## 2019-12-12 ENCOUNTER — APPOINTMENT (OUTPATIENT)
Dept: PHYSICAL THERAPY | Facility: CLINIC | Age: 54
End: 2019-12-12
Payer: COMMERCIAL

## 2019-12-17 ENCOUNTER — EVALUATION (OUTPATIENT)
Dept: PHYSICAL THERAPY | Facility: CLINIC | Age: 54
End: 2019-12-17
Payer: COMMERCIAL

## 2019-12-17 DIAGNOSIS — Z98.890 S/P LEFT ROTATOR CUFF REPAIR: Primary | ICD-10-CM

## 2019-12-17 PROCEDURE — 97140 MANUAL THERAPY 1/> REGIONS: CPT | Performed by: PHYSICAL THERAPIST

## 2019-12-17 PROCEDURE — 97110 THERAPEUTIC EXERCISES: CPT | Performed by: PHYSICAL THERAPIST

## 2019-12-17 PROCEDURE — 97112 NEUROMUSCULAR REEDUCATION: CPT | Performed by: PHYSICAL THERAPIST

## 2019-12-17 NOTE — PROGRESS NOTES
PT Re-Evaluation     Today's date: 2019  Patient name: Hugh Tovar  : 1965  MRN: [id number]  Referring provider: Peggy Castaneda MD  Dx:   Encounter Diagnosis     ICD-10-CM    1  S/P left rotator cuff repair Z98 890                   Assessment  Assessment details: Patient is a 46year old male status post L rotator cuff repair on 10/14/19 resulting in difficulty with ADLs and functional activities  He demonstrates improved PROM and ability to reach actively with LUE now as he has progressed through phases of healing and progression of his rehabilitation based on surgical protocol  His progression was limited recently secondary to one week of noncompliance with POC due to patient illness  He is appropriate for progression of AROM and strengthening at this time  He does demonstrate impaired scapulohumeral rhythm which is impacting his end-range flexion/abduction/upward rotation secondary to restriction of L scapular mobility  He responded well to mobilization with movement and scapular mobility this date  He is appropriate for continued skilled PT to progress and allow return to PLOF  Impairments: abnormal muscle firing, abnormal or restricted ROM, abnormal movement, impaired physical strength, lacks appropriate home exercise program, scapular dyskinesis and poor posture   Understanding of Dx/Px/POC: excellent  Goals  ST  Decrease pain to 3/10 at worst in 4 weeks  - Progressing  2  Increase L shoulder PROM flexion ~ 125 in 4 weeks  - MET  3  Increase L shoulder AROM flexion ~120 degrees in 4 weeks  - MET  4  Return to full PROM in 4 weeks of L shoulder  5  Increase L IR AROM greater than sacrum in 4 weeks  LT  Able to perform ADLs without difficulty/compensation in 8 weeks  - Partially Met  2  Increase L shoulder strength globally 4+/5 in 12 weeks  - Not met  3  Independent in HEP in 8 weeks  - Not met    Plan  Plan details: Progress AROM to strengthening as able     Patient would benefit from: skilled physical therapy  Planned modality interventions: cryotherapy and TENS  Planned therapy interventions: flexibility, home exercise program, functional ROM exercises, therapeutic exercise, therapeutic activities, stretching, strengthening, patient education, postural training, neuromuscular re-education, motor coordination training, Marquez taping, manual therapy and joint mobilization  Frequency: 2x week  Duration in weeks: 8  Treatment plan discussed with: patient and referring physician        Subjective Evaluation    Pain  Current pain ratin  At best pain ratin  At worst pain ratin  Location: L elbow/bicep, L anterior shoulder   Quality: soreness  Relieving factors: ice and medications  Progression: improved      Diagnostic Tests  MRI studies: abnormal  Treatments  Previous treatment: physical therapy  Current treatment: physical therapy  Patient Goals  Patient goals for therapy: decreased pain, increased motion, increased strength, independence with ADLs/IADLs and return to work  Patient goal: have my arm function normal     Patient reports 80% improvement since start of PT  He reports that he has better functional use of the LUE  He notes that he is using the arm more for reaching toward microwave, washing, shopping at the grocery store, etc  He notes that he missed one week due to illness  He notes that overall he has weakness of the L arm still which is most limiting factor at this time  He notes that he has been sleeping in bed without being propped up  He notes that he isn't comfortable with L sidelying yet  Objective     Static Posture     Comments  Impaired scapulohumeral rhythm with overhead ROM/upward rotation    Active Range of Motion   Left Shoulder   Flexion: 130 degrees   Abduction: 82 degrees   External rotation BTH: Active external rotation behind the head: compensation C3  Internal rotation BTB: Active internal rotation behind the back: R glute       Right Shoulder   Flexion: 155 degrees   Abduction: 170 degrees   External rotation BTH: C7   Internal rotation BTB: L5     Passive Range of Motion   Left Shoulder   Flexion: 135 degrees     Strength/Myotome Testing     Left Shoulder     Planes of Motion   Flexion: 4   Abduction: 4-   External rotation at 0°: 4   Internal rotation at 0°: 4+     Right Shoulder   Normal muscle strength    Additional Strength Details  L shoulder strength assessment deferred due to phases of healing and surgical protocol          Diagnosis: S/p L RTC repair (DOS 10/14/19)   Precautions: Protocol Attached     Manual Therapy 11/14/19 11/19/19 11/25/19 11/29 12/10/19 12/17/19   PROM per protocol LH 15' RS 10' RS +abd x10' HS x10' SK 10' RS   Scapular Mobility      5' RS   MWM sidelying abduction/flexion      2' RS            Re-Evaluation      20' RS   Exercise Diary  11/14/19 11/19/19 11/25/19 11/29 12/10/19 12/17/19   Pulleys   3' flexion 5' 5'    Prone scapular retraction         Prone row         Prone T         Bicep Curls: supinated/netural         Sleeper stretch         IR towel Stretch with strap         Sidelying AROM      10xea   Wedge Table ER stretch         Posterior Capsule Stretch                           Supine cane press/flexion  10xea flx 10x  ER 10x  +abd 5x flx x10  ER x10   Abd x10 flx x12  ER x12  Abd x12    TB ER/IR Walkout         TB Rows         Push-up Plus         Wall Slide                                                                                          Modalities 11/14 11/19/19 11/25/19 11/29/19 12/10/19    CP 10' post 10'  10'  10' 10'

## 2019-12-19 ENCOUNTER — OFFICE VISIT (OUTPATIENT)
Dept: PHYSICAL THERAPY | Facility: CLINIC | Age: 54
End: 2019-12-19
Payer: COMMERCIAL

## 2019-12-19 DIAGNOSIS — Z98.890 S/P LEFT ROTATOR CUFF REPAIR: Primary | ICD-10-CM

## 2019-12-19 PROCEDURE — 97140 MANUAL THERAPY 1/> REGIONS: CPT | Performed by: PHYSICAL THERAPIST

## 2019-12-19 PROCEDURE — 97112 NEUROMUSCULAR REEDUCATION: CPT | Performed by: PHYSICAL THERAPIST

## 2019-12-19 PROCEDURE — 97110 THERAPEUTIC EXERCISES: CPT | Performed by: PHYSICAL THERAPIST

## 2019-12-19 NOTE — PROGRESS NOTES
Daily Note     Today's date: 2019  Patient name: Freddie Glass  : 1965  MRN: [id number]  Referring provider: Pat Metcalf MD  Dx:   Encounter Diagnosis     ICD-10-CM    1  S/P left rotator cuff repair Z98 890                   Subjective: He notes that he didn't even need ice after last session  Objective: See treatment diary below      Assessment: Tolerated treatment well  Patient would benefit from continued PT  He demonstrates greatest restriction at end-range flexion with PROM  He was able to tolerated increase repetitions of sidelying ER  He was able to tolerate initiation of serratus punches requiring cues for scapular protraction vs  Full punch motion  He required assistance and tactile cues for scapular retraction and prone T to avoid upper trap compensations  He required increased time to complete exercises this date due to increased cueing for correct form required  He would benefit from progression of rotator cuff strengthening as able to tolerate  Plan: Continue per plan of care  Trial resisted ER/IR as able        Diagnosis: S/p L RTC repair (DOS 10/14/19)   Precautions: Protocol Attached     Manual Therapy 12/19/19 11/19/19 11/25/19 11/29 12/10/19 12/17/19   PROM  10' RS 15' RS 10' RS +abd x10' HS x10' SK 10' RS   Scapular Mobility      5' RS   MWM sidelying abduction/flexion      2' RS            Re-Evaluation      20' RS   Exercise Diary  12/19/19 11/19/19 11/25/19 11/29 12/10/19 12/17/19   Pulleys 3'/3'  3' flexion 5' 5'    Prone scapular retraction 5"x20        Prone row 20x        Prone T 15x        Bicep Curls: supinated/netural         Sleeper stretch         IR towel Stretch with strap         Sidelying AROM ER 20x     10xea   Wedge Table ER stretch         Posterior Capsule Stretch                           Supine cane press/flexion  10xea flx 10x  ER 10x  +abd 5x flx x10  ER x10   Abd x10 flx x12  ER x12  Abd x12    TB ER/IR Walkout         TB Rows Push-up Plus         Wall Slide                                                                                          Modalities 12/19/19 11/19/19 11/25/19 11/29/19 12/10/19    CP 10' 10'  10'  10' 10'

## 2019-12-23 ENCOUNTER — OFFICE VISIT (OUTPATIENT)
Dept: PHYSICAL THERAPY | Facility: CLINIC | Age: 54
End: 2019-12-23
Payer: COMMERCIAL

## 2019-12-23 DIAGNOSIS — Z98.890 S/P LEFT ROTATOR CUFF REPAIR: Primary | ICD-10-CM

## 2019-12-23 PROCEDURE — 97110 THERAPEUTIC EXERCISES: CPT | Performed by: PHYSICAL THERAPIST

## 2019-12-23 PROCEDURE — 97112 NEUROMUSCULAR REEDUCATION: CPT | Performed by: PHYSICAL THERAPIST

## 2019-12-23 NOTE — PROGRESS NOTES
Daily Note     Today's date: 2019  Patient name: Berny Alonso  : 1965  MRN: [id number]  Referring provider: Rafiq Torres MD  Dx:   Encounter Diagnosis     ICD-10-CM    1  S/P left rotator cuff repair Z98 890                   Subjective: He notes that his shoulder was sore so he had to ice it this morning  Objective: See treatment diary below      Assessment: Tolerated treatment well  Patient would benefit from continued PT  He tolerated added resistance for sidelying ER  He demonstrated full AROM in sidelying this date for flexion/abduction  He tolerated initiation of sleeper stretch well this date  He requires increased time throughout session and redirection for attention to rep count  His session was shortened due to patient arriving late to session  He tolerated initiation of posterior capsule stretch well this date  He tolerated initiation of TB ER/IR walkouts and would benefit from progression of resisted active ER/IR  He tolerated resisted TB rows this date  Held manual stretching this date as he demonstrated full AROM in sidelying  Plan: Continue per plan of care  Progress ER/IR strengthening as able        Diagnosis: S/p L RTC repair (DOS 10/14/19)   Precautions: Protocol Attached     Manual Therapy 12/19/19 12/23/19 11/25/19 11/29 12/10/19 12/17/19   PROM  10' RS  10' RS +abd x10' HS x10' SK 10' RS   Scapular Mobility      5' RS   MWM sidelying abduction/flexion      2' RS            Re-Evaluation      20' RS   Exercise Diary  12/19/19 12/23/19 11/25/19 11/29 12/10/19 12/17/19   Pulleys 3'/3' 3'/3' 3' flexion 5' 5'    Prone scapular retraction 5"x20        Prone row 20x        Prone T 15x        Bicep Curls: supinated/netural         Sleeper stretch  10x10"       IR towel Stretch with strap         Sidelying AROM ER 20x ER 20x 2#  Flx 20x  Abd 20x    10xea   Wedge Table ER stretch         Posterior Capsule Stretch  10x10"                         Supine cane press/flexion flx 10x  ER 10x  +abd 5x flx x10  ER x10   Abd x10 flx x12  ER x12  Abd x12    TB ER/IR Walkout  Red 10x       TB Rows  Grn 20x       Push-up Plus         Wall Slide                                                                                          Modalities 12/19/19 12/23/19 11/25/19 11/29/19 12/10/19    CP 10'  10'  10' 10'

## 2019-12-26 ENCOUNTER — APPOINTMENT (OUTPATIENT)
Dept: PHYSICAL THERAPY | Facility: CLINIC | Age: 54
End: 2019-12-26
Payer: COMMERCIAL

## 2019-12-27 ENCOUNTER — OFFICE VISIT (OUTPATIENT)
Dept: PHYSICAL THERAPY | Facility: CLINIC | Age: 54
End: 2019-12-27
Payer: COMMERCIAL

## 2019-12-27 DIAGNOSIS — Z98.890 S/P LEFT ROTATOR CUFF REPAIR: Primary | ICD-10-CM

## 2019-12-27 PROCEDURE — 97140 MANUAL THERAPY 1/> REGIONS: CPT | Performed by: PHYSICAL THERAPIST

## 2019-12-27 PROCEDURE — 97112 NEUROMUSCULAR REEDUCATION: CPT | Performed by: PHYSICAL THERAPIST

## 2019-12-27 PROCEDURE — 97110 THERAPEUTIC EXERCISES: CPT | Performed by: PHYSICAL THERAPIST

## 2019-12-27 NOTE — PROGRESS NOTES
Daily Note     Today's date: 2019  Patient name: Suresh Manuel  : 1965  MRN: [id number]  Referring provider: Nova Peterson MD  Dx:   Encounter Diagnosis     ICD-10-CM    1  S/P left rotator cuff repair Z98 890                   Subjective: Patient reports that he has had a lot of soreness in the shoulder  Objective: See treatment diary below      Assessment: Tolerated treatment well  Patient would benefit from continued PT  He demonstrates improved scapular control this date  He remains most limited in IR and ER  He tolerated added resistance for prone row this date  He was provided an updated HEP  He tolerated initiation of towel IR stretch well without increase in subjective report of pain  He tolerated today's progression well with mild increase in subjective report of pain  He would benefit from progression of rotator cuff strengthening as able to tolerate  Plan: Continue per plan of care  Trial active resisted TB ER/IR as able        Diagnosis: S/p L RTC repair (DOS 10/14/19)   Precautions: Protocol Attached     Manual Therapy 12/19/19 12/23/19 12/27/19 11/29 12/10/19 12/17/19   PROM  10' RS  10' RS x10' HS x10' SK 10' RS   Scapular Mobility      5' RS   MWM sidelying abduction/flexion      2' RS            Re-Evaluation      20' RS   Exercise Diary  12/19/19 12/23/19 12/27/19 11/29 12/10/19 12/17/19   Pulleys 3'/3' 3'/3' 3'/3' 5' 5'    Prone scapular retraction 5"x20  5"x15      Prone row 20x  4# 20x      Prone T 15x  10x      Bicep Curls: supinated/netural         Sleeper stretch  10x10" 10x10"      IR towel Stretch with strap   10x10"      Sidelying AROM ER 20x ER 20x 2#  Flx 20x  Abd 20x    10xea   Table ER stretch   10x10"      Posterior Capsule Stretch  10x10"       Seated T/S 1/2 FR shoulder flexion with cane   10x      Serratus punches                  Supine cane press/flexion    flx x10  ER x10   Abd x10 flx x12  ER x12  Abd x12    TB ER/IR Walkout  Red 10x       TB Rows Grn 20x       Push-up Plus         Wall Slide   15x                                                                                       Modalities 12/19/19 12/23/19 12/27/19 11/29/19 12/10/19    CP 10'  10'  10' 10'

## 2019-12-30 ENCOUNTER — OFFICE VISIT (OUTPATIENT)
Dept: PHYSICAL THERAPY | Facility: CLINIC | Age: 54
End: 2019-12-30
Payer: COMMERCIAL

## 2019-12-30 DIAGNOSIS — Z98.890 S/P LEFT ROTATOR CUFF REPAIR: Primary | ICD-10-CM

## 2019-12-30 PROCEDURE — 97112 NEUROMUSCULAR REEDUCATION: CPT | Performed by: PHYSICAL THERAPIST

## 2019-12-30 PROCEDURE — 97140 MANUAL THERAPY 1/> REGIONS: CPT | Performed by: PHYSICAL THERAPIST

## 2019-12-30 PROCEDURE — 97110 THERAPEUTIC EXERCISES: CPT | Performed by: PHYSICAL THERAPIST

## 2019-12-30 NOTE — PROGRESS NOTES
Daily Note     Today's date: 2019  Patient name: Berny Alonso  : 1965  MRN: [id number]  Referring provider: Rafiq Torres MD  Dx:   Encounter Diagnosis     ICD-10-CM    1  S/P left rotator cuff repair Z98 890                   Subjective: He notes mild soreness after last session and because he's been using it  Objective: See treatment diary below      Assessment: Tolerated treatment well  Patient would benefit from continued PT  He demonstrates full flexion ROM with wall slide without upper trap compensation  He required consistent cues for correct technique with wall push-up plus, plus only with greater difficulty on R with upper trap compensation evident  Session was shortened due to patient arriving late to session  He tolerated resisted active isotonic IR/ER with theraband without increase in subjective report of pain  He tolerated serratus punches well with resistance  He also tolerated sidelying ER with initiation of resistance at 2 lbs  He would benefit from progression of AROM and strengthening to allow return to PLOF  Plan: Continue per plan of care  Trial scaption as able        Diagnosis: S/p L RTC repair (DOS 10/14/19)   Precautions: Protocol Attached     Manual Therapy 19   PROM  10' RS  10' RS 10' RS  10' RS   Scapular Mobility    +lat stretch with scap mobilizations in supine   4' RS  5' RS   MWM sidelying abduction/flexion      2' RS            Re-Evaluation      20' RS   Exercise Diary  19   Pulleys 3'/3' 3'/3' 3'/3' 3'/3'     Prone scapular retraction 5"x20  5"x15      Prone row 20x  4# 20x      Prone T 15x  10x      Bicep Curls: supinated/netural         Sleeper stretch  10x10" 10x10"      IR towel Stretch with strap   10x10"      Sidelying AROM ER 20x ER 20x 2#  Flx 20x  Abd 20x  ER 2# 2x10  10xea   Table ER stretch   10x10"      Posterior Capsule Stretch  10x10"       Seated T/S 1/2 FR shoulder flexion with cane   10x      Serratus punches    4# 5"x20                       TB ER/IR Walkout  Red 10x  Active TB Ir/ER  Red 2x10 ^    TB Rows  Grn 20x       Push-up Plus    10x10" + only     Wall Slide   15x 15x     scaption         Cane extension                                                                        Modalities 12/19/19 12/23/19 12/27/19 12/30/19     CP 10'  10'  10'

## 2020-01-06 ENCOUNTER — APPOINTMENT (OUTPATIENT)
Dept: PHYSICAL THERAPY | Facility: CLINIC | Age: 55
End: 2020-01-06
Payer: COMMERCIAL

## 2020-01-07 ENCOUNTER — OFFICE VISIT (OUTPATIENT)
Dept: PHYSICAL THERAPY | Facility: CLINIC | Age: 55
End: 2020-01-07
Payer: COMMERCIAL

## 2020-01-07 DIAGNOSIS — Z98.890 S/P LEFT ROTATOR CUFF REPAIR: Primary | ICD-10-CM

## 2020-01-07 PROCEDURE — 97112 NEUROMUSCULAR REEDUCATION: CPT | Performed by: PHYSICAL THERAPIST

## 2020-01-07 PROCEDURE — 97110 THERAPEUTIC EXERCISES: CPT | Performed by: PHYSICAL THERAPIST

## 2020-01-07 PROCEDURE — 97140 MANUAL THERAPY 1/> REGIONS: CPT | Performed by: PHYSICAL THERAPIST

## 2020-01-07 NOTE — PROGRESS NOTES
Daily Note     Today's date: 2020  Patient name: Davon Melendez  : 1965  MRN: [id number]  Referring provider: Michelle Benton MD  Dx:   Encounter Diagnosis     ICD-10-CM    1  S/P left rotator cuff repair Z98 890                   Subjective: He notes increased soreness/pain in L upper arm, but not the shoulder  He points to biceps/deltoid region for tightness/pain  Objective: See treatment diary below      Assessment: Tolerated treatment well  Patient would benefit from continued PT  He responded well to [facility] to biceps/deltoid  He continues to present with latissimus restriction as some of his TTP is in intertubercular groove  He demonstrates well maintained gains in AROM and overall rotator cuff/biceps/triceps strength  He did demonstrate overall strength improvements and increased exercises tolerance this date with decreased fatigue post session  He was able to perform scaption/lateral raises without upper trap compensation  Plan: Continue per plan of care  Trial PNF as able        Diagnosis: S/p L RTC repair (DOS 10/14/19)   Precautions: Protocol Attached     Manual Therapy 19   PROM  10' RS  10' RS 10' RS 8' RS 10' RS   Scapular Mobility    +lat stretch with scap mobilizations in supine   4' RS  5' RS   MWM sidelying abduction/flexion      2' RS   STM biceps/deltoid     2' RS                               Re-Evaluation      20' RS   Exercise Diary  19   Pulleys 3'/3' 3'/3' 3'/3' 3'/3' 3'/3'    Prone scapular retraction 5"x20  5"x15      Prone row 20x  4# 20x      Prone T 15x  10x      Bicep Curls: supinated/netural         Sleeper stretch  10x10" 10x10"      IR towel Stretch with strap   10x10"      Sidelying AROM ER 20x ER 20x 2#  Flx 20x  Abd 20x  ER 2# 2x10  10xea   Table ER stretch   10x10"      Posterior Capsule Stretch  10x10"       Seated T/S 1/2 FR shoulder flexion with cane   10x Serratus punches    4# 5"x20                       TB ER/IR Walkout  Red 10x  Active TB Ir/ER  Red 2x10 Grn 20xea    TB Rows  Grn 20x   kesier 15# 20x    Push-up Plus    10x10" + only     Wall Slide   15x 15x     Scaption, lateral raises, front raises     10xea    Cane extension     Table shoulder extension 10x10"    Towel IR stretch                                                               Modalities 12/19/19 12/23/19 12/27/19 12/30/19 1/7/20    CP 10'  10'  10'  10'

## 2020-01-09 ENCOUNTER — OFFICE VISIT (OUTPATIENT)
Dept: PHYSICAL THERAPY | Facility: CLINIC | Age: 55
End: 2020-01-09
Payer: COMMERCIAL

## 2020-01-09 DIAGNOSIS — Z98.890 S/P LEFT ROTATOR CUFF REPAIR: Primary | ICD-10-CM

## 2020-01-09 PROCEDURE — 97140 MANUAL THERAPY 1/> REGIONS: CPT | Performed by: PHYSICAL THERAPIST

## 2020-01-09 PROCEDURE — 97112 NEUROMUSCULAR REEDUCATION: CPT | Performed by: PHYSICAL THERAPIST

## 2020-01-09 PROCEDURE — 97110 THERAPEUTIC EXERCISES: CPT | Performed by: PHYSICAL THERAPIST

## 2020-01-13 ENCOUNTER — APPOINTMENT (OUTPATIENT)
Dept: PHYSICAL THERAPY | Facility: CLINIC | Age: 55
End: 2020-01-13
Payer: COMMERCIAL

## 2020-01-14 ENCOUNTER — EVALUATION (OUTPATIENT)
Dept: PHYSICAL THERAPY | Facility: CLINIC | Age: 55
End: 2020-01-14
Payer: COMMERCIAL

## 2020-01-14 DIAGNOSIS — Z98.890 S/P LEFT ROTATOR CUFF REPAIR: Primary | ICD-10-CM

## 2020-01-14 PROCEDURE — 97140 MANUAL THERAPY 1/> REGIONS: CPT | Performed by: PHYSICAL THERAPIST

## 2020-01-14 NOTE — PROGRESS NOTES
PT Re-Evaluation     Today's date: 2020  Patient name: Trisha Parker  : 1965  MRN: [id number]  Referring provider: Aurora Duque MD  Dx:   Encounter Diagnosis     ICD-10-CM    1  S/P left rotator cuff repair Z98 890                   Assessment  Assessment details: Patient is a 46year old male status post L rotator cuff repair on 10/14/19 resulting in difficulty with ADLs and functional activities  He demonstrates full PROM and improvements in AROM of L shoulder  He remains most limited in shoulder flexion and IR actively  His active shoulder abduction is most significantly improved  He does demonstrate improved strength of rotator cuff and scapular musculature with decreased scapular compensations evident with overhead reaching  He does demonstrate mild thoracic spine extension deficits which impact his available shoulder end-range flexion, but it improves with exercises  He is most limited in ability to use LUE for heavier lifting and overall endurance at overhead ROM  He would benefit from continued skilled PT to address these residual deficits and allow return to PLOF  Impairments: abnormal or restricted ROM, impaired physical strength, lacks appropriate home exercise program, scapular dyskinesis and poor posture   Understanding of Dx/Px/POC: excellent  Goals  ST  Decrease pain to 3/10 at worst in 4 weeks  - MET  2  Increase L shoulder PROM flexion ~ 125 in 4 weeks  - MET  3  Increase L shoulder AROM flexion ~120 degrees in 4 weeks  - MET  4  Return to full PROM in 4 weeks of L shoulder  - MET  5  Increase L IR AROM greater than sacrum in 4 weeks  - Progressing  6  Increase L ER strength at 90 degrees abduction to 4+/5 in 4 weeks  LT  Able to perform ADLs without difficulty/compensation in 8 weeks  - MET  2  Increase L shoulder strength globally 4+/5 in 12 weeks  - Progressing  3  Independent in HEP in 8 weeks  - Not met  4   Able to demonstrate full active L shoulder flexion in 8 weeks  Plan  Plan details: Progress strengthening and transition to [facility] in 4 weeks as able  Patient would benefit from: skilled physical therapy  Planned modality interventions: cryotherapy and TENS  Planned therapy interventions: flexibility, home exercise program, functional ROM exercises, therapeutic exercise, therapeutic activities, stretching, strengthening, patient education, postural training, neuromuscular re-education, motor coordination training, Marquez taping, manual therapy and joint mobilization  Frequency: 2x week  Duration in weeks: 4  Treatment plan discussed with: patient and referring physician        Subjective Evaluation    Pain  Current pain ratin  At best pain ratin  At worst pain ratin  Location: L elbow/bicep, L anterior shoulder   Quality: soreness  Relieving factors: ice  Aggravating factors: lifting  Progression: improved      Diagnostic Tests  MRI studies: abnormal  Treatments  Previous treatment: physical therapy  Current treatment: physical therapy  Patient Goals  Patient goals for therapy: increased strength  Patient goal: have my arm function normal -  Partially Met    Patient reports 90% improvement  He notes that he can reach his back, overhead  He notes that he has less discomfort and soreness in L bicep  He notes improvements in strength  He notes that he has not yet return to his orthopedic physician  He notes that he is still cautious of reactionary movements and taking chances  He notes that he can tolerate L sidelying to sleep       Objective     Static Posture     Comments  Limited thoracic extension impacting end-range flexion    Active Range of Motion   Left Shoulder   Flexion: 140 degrees   Abduction: 150 degrees   External rotation BTH: C7   Internal rotation BTB: sacrum     Right Shoulder   Flexion: 155 degrees   Abduction: 170 degrees   External rotation BTH: C7   Internal rotation BTB: L5     Strength/Myotome Testing     Left Shoulder     Planes of Motion   Flexion: 4+   Abduction: 4+   External rotation at 0°: 4+   External rotation at 45°: 4   External rotation at 90°: 4   Internal rotation at 0°: 4+     Right Shoulder   Normal muscle strength      Diagnosis: S/p L RTC repair (DOS 10/14/19)   Precautions: Protocol Attached     Manual Therapy 1/14/20 12/23/19 12/27/19 12/30/19 1/7/20 1/9/20   PROM    10' RS 10' RS 8' RS 8' RS   Scapular Mobility    +lat stretch with scap mobilizations in supine   4' RS     MWM sidelying abduction/flexion         STM biceps/deltoid     2' RS                               Re-Evaluation 25' RS        Exercise Diary  1/14/20 12/23/19 12/27/19 12/30/19 1/7/20 1/9/20   Pulleys  3'/3' 3'/3' 3'/3' 3'/3' 3'/3'   Prone scapular retraction   5"x15      Prone row   4# 20x      Prone T   10x      Bicep Curls: supinated/netural         Sleeper stretch  10x10" 10x10"      IR towel Stretch with strap 10x10"  10x10"      Sleeper stretch 10x10"        Sidelying AROM ER 5# 20x ER 20x 2#  Flx 20x  Abd 20x  ER 2# 2x10     Table ER stretch   10x10"      Posterior Capsule Stretch  10x10"       Seated T/S 1/2 FR shoulder flexion with cane   10x      Serratus punches    4# 5"x20     Bicep curls      5# 20xea alternating            TB ER/IR Walkout  Red 10x  Active TB Ir/ER  Red 2x10 Grn 20xea    TB Rows  Grn 20x   kesier 15# 20x    Push-up Plus    10x10" + only  10x10" + only   Wall Slide   15x 15x     Scaption, lateral raises, front raises     10xea 10xea   Cane extension     Table shoulder extension 10x10"    Bench bent over Row 10# 2x10ea        Bench bent over T         PNF D1, D2 flx/ext      Yellow 10xea   Yuan ER                                             Modalities 1/14/20 12/23/19 12/27/19 12/30/19 1/7/20 1/9/20   CP 10'   10'  10'  10' 10'

## 2020-01-16 ENCOUNTER — OFFICE VISIT (OUTPATIENT)
Dept: PHYSICAL THERAPY | Facility: CLINIC | Age: 55
End: 2020-01-16
Payer: COMMERCIAL

## 2020-01-16 DIAGNOSIS — Z98.890 S/P LEFT ROTATOR CUFF REPAIR: Primary | ICD-10-CM

## 2020-01-16 PROCEDURE — 97110 THERAPEUTIC EXERCISES: CPT | Performed by: PHYSICAL THERAPIST

## 2020-01-16 PROCEDURE — 97112 NEUROMUSCULAR REEDUCATION: CPT | Performed by: PHYSICAL THERAPIST

## 2020-01-16 NOTE — PROGRESS NOTES
Daily Note     Today's date: 2020  Patient name: Dale Davidson  : 1965  MRN: [id number]  Referring provider: Herman Garland MD  Dx:   Encounter Diagnosis     ICD-10-CM    1  S/P left rotator cuff repair Z98 890                   Subjective: He had no new complaints this date  Objective: See treatment diary below      Assessment: Tolerated treatment well  Patient would benefit from continued PT  He was challenged with single arm rows and tricep extensions this date due to fatigue  He tolerated added resistance for PNF this date and required minimal cues for technique  He was challenged with [facility] ER, unable to maintain shoulder abduction  He continues to be most limited secondary to strength and endurance  Plan: Continue per plan of care  Decreased POC to 1x/week due to patient request for time and financial aspect        Diagnosis: S/p L RTC repair (DOS 10/14/19)   Precautions: Protocol Attached     Manual Therapy 19   PROM    10' RS 10' RS 8' RS 8' RS   Scapular Mobility    +lat stretch with scap mobilizations in supine   4' RS     MWM sidelying abduction/flexion         STM biceps/deltoid     2' RS                               Re-Evaluation         Exercise Diary  19   Pulleys 2'/2' 3'/3' 3'/3' 3'/3' 3'/3' 3'/3'   Prone scapular retraction   5"x15      Prone row   4# 20x      Prone T   10x      Bicep Curls: supinated/netural         Sleeper stretch  10x10" 10x10"      IR towel Stretch with strap   10x10"      Sidelying AROM  ER 20x 2#  Flx 20x  Abd 20x  ER 2# 2x10     Table ER stretch   10x10"      Posterior Capsule Stretch  10x10"       Seated T/S 1/2 FR shoulder flexion with cane   10x      Serratus punches    4# 5"x20     Bicep curls      5# 20xea alternating            TB ER/IR Walkout  Red 10x  Active TB Ir/ER  Red 2x10 Grn 20xea    TB Rows Single arm 15# 15xea Grn 20x   kesier 15# 20x Push-up Plus    10x10" + only  10x10" + only   Wall Slide   15x 15x     Scaption, lateral raises, front raises     10xea 10xea   Cane extension     Table shoulder extension 10x10"    Triceps Extensions 15# 15x        Towel IR stretch         PNF D1, D2 flx/ext Red 15xea     Yellow 10xea   Yuan ER  Yellow 10xea  Red 10xea                                            Modalities 1/16/20 12/23/19 12/27/19 12/30/19 1/7/20 1/9/20   CP 10'  10'  10'  10' 10'

## 2020-01-20 ENCOUNTER — APPOINTMENT (OUTPATIENT)
Dept: PHYSICAL THERAPY | Facility: CLINIC | Age: 55
End: 2020-01-20
Payer: COMMERCIAL

## 2020-01-21 ENCOUNTER — OFFICE VISIT (OUTPATIENT)
Dept: PHYSICAL THERAPY | Facility: CLINIC | Age: 55
End: 2020-01-21
Payer: COMMERCIAL

## 2020-01-21 DIAGNOSIS — Z98.890 S/P LEFT ROTATOR CUFF REPAIR: Primary | ICD-10-CM

## 2020-01-21 PROCEDURE — 97110 THERAPEUTIC EXERCISES: CPT | Performed by: PHYSICAL THERAPIST

## 2020-01-21 PROCEDURE — 97112 NEUROMUSCULAR REEDUCATION: CPT | Performed by: PHYSICAL THERAPIST

## 2020-01-21 NOTE — PROGRESS NOTES
Daily Note     Today's date: 2020  Patient name: Cherelle Cadet  : 1965  MRN: [id number]  Referring provider: Scott Miller MD  Dx:   Encounter Diagnosis     ICD-10-CM    1  S/P left rotator cuff repair Z98 890                   Subjective: He notes that he has been carrying cases of water with both hands, and he shoveled via pushing  He notes that he had no soreness  Objective: See treatment diary below      Assessment: Tolerated treatment well  Patient would benefit from continued PT  He required cues for correct technique with tricep extension  He was challenged with rhythmic stabilization with OH ROM  He was challenged with single arm scapular retraction against resistance without row compensation  He was able to perform after several repetitions in prone with tactile cues  He was challenged with wall clock against resistance this date for correct technique  He was provided and updated HEP  Plan: Continue per plan of care  Decreased POC to 1x/week        Diagnosis: S/p L RTC repair (DOS 10/14/19)   Precautions: Protocol Attached     Manual Therapy 19   PROM    10' RS 10' RS 8' RS 8' RS   Scapular Mobility    +lat stretch with scap mobilizations in supine   4' RS     MWM sidelying abduction/flexion         STM biceps/deltoid     2' RS                               Re-Evaluation         Exercise Diary  19   Pulleys 2'/2' 3'/3' 3'/3' 3'/3' 3'/3' 3'/3'   Prone scapular retraction   5"x15      Prone row   4# 20x      Prone T   10x      Bicep Curls: supinated/netural         Sleeper stretch   10x10"      IR towel Stretch with strap   10x10"      Sidelying AROM    ER 2# 2x10     Table ER stretch   10x10"      Posterior Capsule Stretch         Seated T/S 1/2 FR shoulder flexion with cane   10x      Serratus punches    4# 5"x20     Bicep curls      5# 20xea alternating            TB ER/IR Walkout Active TB Ir/ER  Red 2x10 Grn 20xea    TB Rows Single arm 15# 15xea    kesier 15# 20x    Push-up Plus    10x10" + only  10x10" + only   Wall Slide   15x 15x     Scaption, lateral raises, front raises     10xea 10xea   Cane extension     Table shoulder extension 10x10"    Triceps Extensions 15# 15x 15# 20x       Towel IR stretch         PNF D1, D2 flx/ext Red 15xea     Yellow 10xea   Yuan ER  Yellow 10xea  Red 10xea        Rhythmic Stabilization OH  Red 10x       Wall Clock  Red 5x                         Modalities 1/16/20 1/21/20 12/27/19 12/30/19 1/7/20 1/9/20   CP 10' 10'  10'  10'  10' 10'

## 2020-01-23 ENCOUNTER — APPOINTMENT (OUTPATIENT)
Dept: PHYSICAL THERAPY | Facility: CLINIC | Age: 55
End: 2020-01-23
Payer: COMMERCIAL

## 2020-01-27 ENCOUNTER — APPOINTMENT (OUTPATIENT)
Dept: PHYSICAL THERAPY | Facility: CLINIC | Age: 55
End: 2020-01-27
Payer: COMMERCIAL

## 2020-01-28 ENCOUNTER — APPOINTMENT (OUTPATIENT)
Dept: PHYSICAL THERAPY | Facility: CLINIC | Age: 55
End: 2020-01-28
Payer: COMMERCIAL

## 2020-01-29 ENCOUNTER — OFFICE VISIT (OUTPATIENT)
Dept: OBGYN CLINIC | Facility: CLINIC | Age: 55
End: 2020-01-29
Payer: COMMERCIAL

## 2020-01-29 VITALS
DIASTOLIC BLOOD PRESSURE: 70 MMHG | BODY MASS INDEX: 28.34 KG/M2 | HEIGHT: 68 IN | HEART RATE: 64 BPM | SYSTOLIC BLOOD PRESSURE: 115 MMHG | WEIGHT: 187 LBS

## 2020-01-29 DIAGNOSIS — Z98.890 STATUS POST ARTHROSCOPY OF LEFT SHOULDER: Primary | ICD-10-CM

## 2020-01-29 DIAGNOSIS — Z98.890 STATUS POST LEFT ROTATOR CUFF REPAIR: ICD-10-CM

## 2020-01-29 PROCEDURE — 3008F BODY MASS INDEX DOCD: CPT | Performed by: ORTHOPAEDIC SURGERY

## 2020-01-29 PROCEDURE — 99213 OFFICE O/P EST LOW 20 MIN: CPT | Performed by: ORTHOPAEDIC SURGERY

## 2020-01-29 NOTE — PROGRESS NOTES
Assessment/Plan:  1  Status post arthroscopy of left shoulder     2  Status post left rotator cuff repair         Scribe Attestation    I,:   Radha Schwartz am acting as a scribe while in the presence of the attending physician :        I,:   Cliff Narvaez MD personally performed the services described in this documentation    as scribed in my presence :          Samuel He has progressed well with his left shoulder  His physical examination is overall benign today  He demonstrates great range of motion and strength about his shoulder  At this time, he may finish off the week and discontinue formal physical therapy  However, I would like him to continue with his home exercise program   His formal physical therapy notes were reviewed today in the office  At this time, he may see us back on an as-needed basis  Subjective:   Mehnaz Carrillo is a 53 y o  male who presents to the office today for 15 weeks status post left shoulder arthroscopy, rotator cuff repair and subacromial decompression  He states he has been compliant with formal physical therapy and has seen significant results  We did review his physical therapy notes today  At today's visit, he denies any pain or discomfort about his shoulder  He does appreciated full range of motion  He denies any radicular symptoms  He denies any numbness and or tingling  He denies taking any medication  Review of Systems   Constitutional: Negative for chills, fever and unexpected weight change  HENT: Negative for hearing loss, nosebleeds and sore throat  Eyes: Negative for pain, redness and visual disturbance  Respiratory: Negative for cough, shortness of breath and wheezing  Cardiovascular: Negative for chest pain, palpitations and leg swelling  Gastrointestinal: Negative for abdominal pain, nausea and vomiting  Endocrine: Negative for polyphagia and polyuria  Genitourinary: Negative for dysuria and hematuria     Musculoskeletal: Positive for myalgias  See HPI   Skin: Negative for rash and wound  Neurological: Negative for dizziness, numbness and headaches  Psychiatric/Behavioral: Negative for decreased concentration and suicidal ideas  The patient is not nervous/anxious  Past Medical History:   Diagnosis Date    Dermatitis     Resolved 3/31/2016     Dyshidrosis     Resolved 3/31/2016        Past Surgical History:   Procedure Laterality Date    COLONOSCOPY      MS SHLDR ARTHROSCOP,SURG,W/ROTAT CUFF REPR Left 10/14/2019    Procedure: SHOULDER ARTHROSCOPY, ROTATOR CUFF REPAIR AND SUBACROMIAL DECOMPRESSION;  Surgeon: Mikey Grove MD;  [facility]: [facility];  Service: Orthopedics    ROTATOR CUFF REPAIR      SKIN BIOPSY      VASECTOMY         Family History   Problem Relation Age of Onset    Arthritis Mother     Deep vein thrombosis Mother     Prostate cancer Father    Donald Salazar Other Son         Blurring vision  Vision problems due to auto-immune        No Known Problems Sister     No Known Problems Brother     No Known Problems Maternal Aunt     No Known Problems Maternal Uncle     No Known Problems Paternal Aunt     No Known Problems Paternal Uncle     No Known Problems Maternal Grandmother     No Known Problems Maternal Grandfather     No Known Problems Paternal Grandmother     No Known Problems Paternal Grandfather     Mental illness Neg Hx        Social History     Occupational History    Not on file   Tobacco Use    Smoking status: Never Smoker    Smokeless tobacco: Never Used   Substance and Sexual Activity    Alcohol use: Never     Frequency: Never    Drug use: Never    Sexual activity: Not on file         Current Outpatient Medications:     acetaminophen (TYLENOL) 325 mg tablet, Take 650 mg by mouth every 6 (six) hours as needed for mild pain, Disp: , Rfl:     Na Sulfate-K Sulfate-Mg Sulf (SUPREP BOWEL PREP KIT) 17 5-3 13-1 6 GM/177ML SOLN, Take 2 Bottles by mouth see administration instructions Suprep bowel prep  Please follow the instructions from the office (Patient not taking: Reported on 1/29/2020), Disp: 2 Bottle, Rfl: 0    sildenafil (VIAGRA) 100 mg tablet, TK 1 T PO PRN, Disp: , Rfl: 0    No Known Allergies    Objective:  Vitals:    01/29/20 1731   BP: 115/70   Pulse: 64       Left Shoulder Exam     Tenderness   The patient is experiencing no tenderness  Range of Motion   Active abduction: 160   External rotation: 60   Forward flexion: 160   Internal rotation 0 degrees: L4     Muscle Strength   Abduction: 4/5   Internal rotation: 5/5   External rotation: 5/5     Tests   Jay test: negative  Impingement: negative  Drop arm: negative    Other   Erythema: absent  Scars: present  Sensation: normal  Pulse: present     Comments:    Mild crepitus with range of motion  Physical Exam   Constitutional: He is oriented to person, place, and time  He appears well-developed and well-nourished  HENT:   Head: Normocephalic and atraumatic  Eyes: Pupils are equal, round, and reactive to light  Conjunctivae are normal    Neck: Normal range of motion  Neck supple  Cardiovascular: Normal rate and intact distal pulses  Pulmonary/Chest: Effort normal  No respiratory distress  Musculoskeletal:   As noted in HPI   Neurological: He is alert and oriented to person, place, and time  Skin: Skin is warm and dry  Psychiatric: He has a normal mood and affect  His behavior is normal    Nursing note and vitals reviewed

## 2020-01-30 ENCOUNTER — OFFICE VISIT (OUTPATIENT)
Dept: PHYSICAL THERAPY | Facility: CLINIC | Age: 55
End: 2020-01-30
Payer: COMMERCIAL

## 2020-01-30 DIAGNOSIS — Z98.890 S/P LEFT ROTATOR CUFF REPAIR: Primary | ICD-10-CM

## 2020-01-30 PROCEDURE — 97110 THERAPEUTIC EXERCISES: CPT | Performed by: PHYSICAL THERAPIST

## 2020-01-30 PROCEDURE — 97140 MANUAL THERAPY 1/> REGIONS: CPT | Performed by: PHYSICAL THERAPIST

## 2020-01-30 NOTE — PROGRESS NOTES
PT Re-Evaluation     Today's date: 2020  Patient name: Derrell Jones  : 1965  MRN: [id number]  Referring provider: Penelope Tadeo MD  Dx:   Encounter Diagnosis     ICD-10-CM    1  S/P left rotator cuff repair Z98 890                   Assessment  Assessment details: Patient is a 46year old male status post L rotator cuff repair on 10/14/19 resulting in difficulty with ADLs and functional activities  He demonstrates return to PLOF  He demonstrates increased ROM and strength  He demonstrates mild residual deficits which should continue to improve with HEP  He was recently provided updated HEP  He has been compliant with HEP as his POC had recently been decreased to 1x/week  He is appropriate for and agreeable to discharge at this time  Impairments: impaired physical strength and lacks appropriate home exercise program    Goals  ST  Decrease pain to 3/10 at worst in 4 weeks  - MET  2  Increase L shoulder PROM flexion ~ 125 in 4 weeks  - MET  3  Increase L shoulder AROM flexion ~120 degrees in 4 weeks  - MET  4  Return to full PROM in 4 weeks of L shoulder  - MET  5  Increase L IR AROM greater than sacrum in 4 weeks  - MET  6  Increase L ER strength at 90 degrees abduction to 4+/5 in 4 weeks  -MET    LT  Able to perform ADLs without difficulty/compensation in 8 weeks  - MET  2  Increase L shoulder strength globally 4+/5 in 12 weeks  - Progressing - MET  3  Independent in HEP in 8 weeks  - MET  4  Able to demonstrate full active L shoulder flexion in 8 weeks  - Partially Met    Plan  Plan details: Discharge to [facility]  Planned therapy interventions: home exercise program  Treatment plan discussed with: patient        Subjective Evaluation    Pain  Current pain ratin  At best pain ratin  At worst pain ratin  Location: L elbow/bicep, L anterior shoulder   Quality: soreness    Relieving factors: ice  Progression: resolved      Diagnostic Tests  MRI studies: abnormal  Treatments  Previous treatment: physical therapy  Current treatment: physical therapy  Patient Goals  Patient goal: have my arm function normal -  Met    Patient reports 90% improvement  He reports that he had his surgeon follow-up this week who was pleased with his progress  He notes that he feels ready to transition to HEP  He reports confidence in ability to continue with HEP  He notes that he just needs continued time for continuous improvement       Objective     Static Posture     Comments  Limited thoracic extension impacting end-range flexion    Active Range of Motion   Left Shoulder   Flexion: 145 degrees   Abduction: 155 degrees   External rotation BTH: T1   Internal rotation BTB: Active internal rotation behind the back: L iliac crest      Right Shoulder   Flexion: 155 degrees   Abduction: 170 degrees   External rotation BTH: C7   Internal rotation BTB: L5     Strength/Myotome Testing     Left Shoulder     Planes of Motion   Flexion: 5   Abduction: 4+   External rotation at 0°: 5   External rotation at 45°: 5   External rotation at 90°: 4+   Internal rotation at 0°: 4+     Right Shoulder   Normal muscle strength    Diagnosis: S/p L RTC repair (DOS 10/14/19)   Precautions: Protocol Attached     Manual Therapy 1/16/20 1/21/20 1/30/20 12/30/19 1/7/20 1/9/20   PROM     10' RS 8' RS 8' RS   Scapular Mobility    +lat stretch with scap mobilizations in supine   4' RS     MWM sidelying abduction/flexion         STM biceps/deltoid     2' RS                               Re-Evaluation   20' RS      Exercise Diary  1/16/20 1/21/20 1/30/20 12/30/19 1/7/20 1/9/20   Pulleys 2'/2' 3'/3' 3'/3' 3'/3' 3'/3' 3'/3'   Prone scapular retraction         Prone row         Prone T         Bicep Curls: supinated/netural         Sleeper stretch         IR towel Stretch with strap         Sidelying AROM    ER 2# 2x10     Table ER stretch         Posterior Capsule Stretch         Seated T/S 1/2 FR shoulder flexion with cane Serratus punches    4# 5"x20     Bicep curls      5# 20xea alternating            TB ER/IR Walkout    Active TB Ir/ER  Red 2x10 Grn 20xea    TB Rows Single arm 15# 15xea    kesier 15# 20x    Push-up Plus    10x10" + only  10x10" + only   Wall Slide    15x     Scaption, lateral raises, front raises     10xea 10xea   Cane extension     Table shoulder extension 10x10"    Triceps Extensions 15# 15x 15# 20x       Towel IR stretch         PNF D1, D2 flx/ext Red 15xea  Grn 20xea   Yellow 10xea   Yuan ER  Yellow 10xea  Red 10xea        Rhythmic Stabilization OH  Red 10x       Wall Clock  Red 5x                HEP Review   18'       Modalities 1/16/20 1/21/20 1/30/20 12/30/19 1/7/20 1/9/20   CP 10' 10'  10' 10'  10' 10'

## 2020-02-17 ENCOUNTER — TELEPHONE (OUTPATIENT)
Dept: GASTROENTEROLOGY | Facility: AMBULARY SURGERY CENTER | Age: 55
End: 2020-02-17

## 2020-02-17 NOTE — TELEPHONE ENCOUNTER
Letter sent to patient to call in and schedule repeat colonoscopy with Dr Dimas Quinn due to hx of t v  Adenoma per recall

## 2021-03-10 DIAGNOSIS — Z23 ENCOUNTER FOR IMMUNIZATION: ICD-10-CM

## 2021-10-05 ENCOUNTER — HOSPITAL ENCOUNTER (OUTPATIENT)
Dept: ULTRASOUND IMAGING | Facility: HOSPITAL | Age: 56
Discharge: HOME | End: 2021-10-05
Attending: UROLOGY | Admitting: UROLOGY
Payer: COMMERCIAL

## 2021-10-05 DIAGNOSIS — R97.20 ELEVATED PROSTATE SPECIFIC ANTIGEN (PSA): ICD-10-CM

## 2021-10-05 PROCEDURE — 76942 ECHO GUIDE FOR BIOPSY: CPT

## 2021-10-05 PROCEDURE — G0416 PROSTATE BIOPSY, ANY MTHD: HCPCS | Performed by: PATHOLOGY

## 2021-10-05 PROCEDURE — 55700 HB BIOPSY OF PROSTATE: CPT

## 2021-10-05 RX ORDER — LIDOCAINE HYDROCHLORIDE 10 MG/ML
10 INJECTION, SOLUTION EPIDURAL; INFILTRATION; INTRACAUDAL; PERINEURAL ONCE
Status: COMPLETED | OUTPATIENT
Start: 2021-10-05 | End: 2021-10-05

## 2021-10-05 RX ORDER — LIDOCAINE HYDROCHLORIDE 20 MG/ML
1 JELLY TOPICAL ONCE
Status: COMPLETED | OUTPATIENT
Start: 2021-10-05 | End: 2021-10-05

## 2021-10-05 RX ADMIN — LIDOCAINE HYDROCHLORIDE 1 APPLICATION: 20 JELLY TOPICAL at 09:24

## 2021-10-05 RX ADMIN — LIDOCAINE HYDROCHLORIDE 10 ML: 10 INJECTION, SOLUTION EPIDURAL; INFILTRATION; INTRACAUDAL; PERINEURAL at 09:26

## 2021-10-19 ENCOUNTER — HOSPITAL ENCOUNTER (OUTPATIENT)
Dept: RADIOLOGY | Facility: HOSPITAL | Age: 56
Discharge: HOME | End: 2021-10-19
Payer: COMMERCIAL

## 2021-10-19 DIAGNOSIS — C61 MALIGNANT NEOPLASM OF PROSTATE (HCC): ICD-10-CM

## 2021-10-19 PROCEDURE — 72193 CT PELVIS W/DYE: CPT

## 2021-10-19 PROCEDURE — 78306 BONE IMAGING WHOLE BODY: CPT

## 2021-10-19 PROCEDURE — G1004 CDSM NDSC: HCPCS

## 2021-10-19 PROCEDURE — A9503 TC99M MEDRONATE: HCPCS

## 2021-10-19 RX ADMIN — IOHEXOL 100 ML: 350 INJECTION, SOLUTION INTRAVENOUS at 09:54

## 2021-10-27 ENCOUNTER — TELEPHONE (OUTPATIENT)
Dept: GASTROENTEROLOGY | Facility: CLINIC | Age: 56
End: 2021-10-27

## 2021-10-28 ENCOUNTER — TELEPHONE (OUTPATIENT)
Dept: GASTROENTEROLOGY | Facility: CLINIC | Age: 56
End: 2021-10-28

## 2021-10-28 ENCOUNTER — TELEPHONE (OUTPATIENT)
Dept: GASTROENTEROLOGY | Facility: AMBULARY SURGERY CENTER | Age: 56
End: 2021-10-28

## 2021-11-10 ENCOUNTER — TELEPHONE (OUTPATIENT)
Dept: GASTROENTEROLOGY | Facility: HOSPITAL | Age: 56
End: 2021-11-10

## 2021-11-11 ENCOUNTER — HOSPITAL ENCOUNTER (OUTPATIENT)
Dept: GASTROENTEROLOGY | Facility: HOSPITAL | Age: 56
Setting detail: OUTPATIENT SURGERY
Discharge: HOME | End: 2021-11-11
Attending: INTERNAL MEDICINE | Admitting: INTERNAL MEDICINE
Payer: COMMERCIAL

## 2021-11-11 ENCOUNTER — ANESTHESIA EVENT (OUTPATIENT)
Dept: GASTROENTEROLOGY | Facility: HOSPITAL | Age: 56
End: 2021-11-11

## 2021-11-11 ENCOUNTER — ANESTHESIA (OUTPATIENT)
Dept: GASTROENTEROLOGY | Facility: HOSPITAL | Age: 56
End: 2021-11-11

## 2021-11-11 VITALS
RESPIRATION RATE: 20 BRPM | OXYGEN SATURATION: 100 % | DIASTOLIC BLOOD PRESSURE: 81 MMHG | TEMPERATURE: 97.8 F | HEART RATE: 70 BPM | SYSTOLIC BLOOD PRESSURE: 135 MMHG

## 2021-11-11 DIAGNOSIS — Z86.010 HX OF COLONIC POLYPS: ICD-10-CM

## 2021-11-11 DIAGNOSIS — C61 PROSTATE CA (HCC): ICD-10-CM

## 2021-11-11 PROCEDURE — G0105 COLORECTAL SCRN; HI RISK IND: HCPCS | Performed by: INTERNAL MEDICINE

## 2021-11-11 RX ORDER — LIDOCAINE HYDROCHLORIDE 10 MG/ML
INJECTION, SOLUTION EPIDURAL; INFILTRATION; INTRACAUDAL; PERINEURAL AS NEEDED
Status: DISCONTINUED | OUTPATIENT
Start: 2021-11-11 | End: 2021-11-11

## 2021-11-11 RX ORDER — SODIUM CHLORIDE, SODIUM LACTATE, POTASSIUM CHLORIDE, CALCIUM CHLORIDE 600; 310; 30; 20 MG/100ML; MG/100ML; MG/100ML; MG/100ML
125 INJECTION, SOLUTION INTRAVENOUS CONTINUOUS
Status: DISCONTINUED | OUTPATIENT
Start: 2021-11-11 | End: 2021-11-15 | Stop reason: HOSPADM

## 2021-11-11 RX ORDER — PROPOFOL 10 MG/ML
INJECTION, EMULSION INTRAVENOUS AS NEEDED
Status: DISCONTINUED | OUTPATIENT
Start: 2021-11-11 | End: 2021-11-11

## 2021-11-11 RX ADMIN — PROPOFOL 40 MG: 10 INJECTION, EMULSION INTRAVENOUS at 10:30

## 2021-11-11 RX ADMIN — PROPOFOL 30 MG: 10 INJECTION, EMULSION INTRAVENOUS at 10:33

## 2021-11-11 RX ADMIN — PROPOFOL 120 MG: 10 INJECTION, EMULSION INTRAVENOUS at 10:27

## 2021-11-11 RX ADMIN — SODIUM CHLORIDE, SODIUM LACTATE, POTASSIUM CHLORIDE, AND CALCIUM CHLORIDE: .6; .31; .03; .02 INJECTION, SOLUTION INTRAVENOUS at 10:23

## 2021-11-11 RX ADMIN — LIDOCAINE HYDROCHLORIDE 50 MG: 10 INJECTION, SOLUTION EPIDURAL; INFILTRATION; INTRACAUDAL at 10:27

## 2021-11-23 LAB — SCAN RESULT: NORMAL

## 2022-03-16 ENCOUNTER — NEW PATIENT (OUTPATIENT)
Dept: URBAN - METROPOLITAN AREA CLINIC 6 | Facility: CLINIC | Age: 57
End: 2022-03-16

## 2022-03-16 DIAGNOSIS — H47.092: ICD-10-CM

## 2022-03-16 DIAGNOSIS — H40.023: ICD-10-CM

## 2022-03-16 PROCEDURE — 92250 FUNDUS PHOTOGRAPHY W/I&R: CPT

## 2022-03-16 PROCEDURE — 92004 COMPRE OPH EXAM NEW PT 1/>: CPT

## 2022-03-16 ASSESSMENT — TONOMETRY
OD_IOP_MMHG: 16
OS_IOP_MMHG: 8
OD_IOP_MMHG: 15

## 2022-03-16 ASSESSMENT — VISUAL ACUITY
OD_CC: 20/40
OU_CC: J1+
OS_PH: 20/30-1
OD_PH: 20/30
OS_CC: 20/40-1

## 2022-04-04 RX ORDER — TAMSULOSIN HYDROCHLORIDE 0.4 MG/1
0.4 CAPSULE ORAL
COMMUNITY
Start: 2022-01-31

## 2022-04-05 ENCOUNTER — OFFICE VISIT (OUTPATIENT)
Dept: FAMILY MEDICINE CLINIC | Facility: CLINIC | Age: 57
End: 2022-04-05
Payer: COMMERCIAL

## 2022-04-05 VITALS
TEMPERATURE: 98 F | RESPIRATION RATE: 16 BRPM | DIASTOLIC BLOOD PRESSURE: 70 MMHG | BODY MASS INDEX: 28.88 KG/M2 | SYSTOLIC BLOOD PRESSURE: 120 MMHG | HEART RATE: 76 BPM | WEIGHT: 184 LBS | HEIGHT: 67 IN

## 2022-04-05 DIAGNOSIS — Z92.3 HISTORY OF THERAPEUTIC RADIATION: ICD-10-CM

## 2022-04-05 DIAGNOSIS — Z13.6 SCREENING FOR CARDIOVASCULAR CONDITION: ICD-10-CM

## 2022-04-05 DIAGNOSIS — Z11.59 NEED FOR HEPATITIS C SCREENING TEST: ICD-10-CM

## 2022-04-05 DIAGNOSIS — C61 PROSTATE CANCER (HCC): Primary | ICD-10-CM

## 2022-04-05 DIAGNOSIS — E78.00 ELEVATED LOW-DENSITY LIPOPROTEIN LEVEL: ICD-10-CM

## 2022-04-05 PROCEDURE — 3725F SCREEN DEPRESSION PERFORMED: CPT | Performed by: FAMILY MEDICINE

## 2022-04-05 PROCEDURE — 99214 OFFICE O/P EST MOD 30 MIN: CPT | Performed by: FAMILY MEDICINE

## 2022-04-05 PROCEDURE — 3008F BODY MASS INDEX DOCD: CPT | Performed by: FAMILY MEDICINE

## 2022-04-05 PROCEDURE — 1036F TOBACCO NON-USER: CPT | Performed by: FAMILY MEDICINE

## 2022-04-05 NOTE — PROGRESS NOTES
Assessment/Plan:    1  Prostate cancer ([facility] [address] )  -     DXA bone density spine hip and pelvis; Future; Expected date: 04/05/2022  -     CBC; Future    2  Elevated low-density lipoprotein level  -     CBC; Future  -     Comprehensive metabolic panel; Future  -     Lipid Panel with Direct LDL reflex; Future    3  History of therapeutic radiation  -     DXA bone density spine hip and pelvis; Future; Expected date: 04/05/2022  -     CBC; Future    4  Need for hepatitis C screening test  -     CBC; Future  -     Hepatitis C antibody; Future    5  Screening for cardiovascular condition  -     CBC; Future            There are no Patient Instructions on file for this visit  No follow-ups on file  Subjective:      Patient ID: Elana Stahl is a 61 y o  male  Chief Complaint   Patient presents with    Follow-up     not been seen in a while, discuss finishing treatments  ac/lpn    Order lab work     bone density       Pt is sched for "run some tests"  Pt was dx with prostate cancer  Pt states he had radiation states his insurance told him he needed a test for bone density  Pt states new yourk will be doing the PSA for him    Pt has been doing physical therapy after a fall on ice - today was his last day  This morning he was abl;e to do lawn work and he felt good    NO pain  No errections  No urinary symptoms - pt is on flomax            The following portions of the patient's history were reviewed and updated as appropriate: allergies, current medications, past family history, past medical history, past social history, past surgical history and problem list     Review of Systems   Constitutional: Negative for activity change, appetite change, chills, diaphoresis, fatigue, fever and unexpected weight change  HENT: Negative for congestion, dental problem, ear pain, mouth sores, sinus pressure, sinus pain, sore throat and trouble swallowing  Eyes: Negative for photophobia, discharge and itching     Respiratory: Negative for apnea, chest tightness and shortness of breath  Cardiovascular: Negative for chest pain, palpitations and leg swelling  Gastrointestinal: Negative for abdominal distention, abdominal pain, blood in stool, nausea and vomiting  Endocrine: Negative for cold intolerance, heat intolerance, polydipsia, polyphagia and polyuria  Genitourinary: Negative for difficulty urinating  Musculoskeletal: Negative for arthralgias  Skin: Negative for color change and wound  Neurological: Negative for dizziness, syncope, speech difficulty and headaches  Hematological: Negative for adenopathy  Psychiatric/Behavioral: Negative for agitation and behavioral problems  Current Outpatient Medications   Medication Sig Dispense Refill    acetaminophen (TYLENOL) 325 mg tablet Take 650 mg by mouth every 6 (six) hours as needed for mild pain      tamsulosin (FLOMAX) 0 4 mg Take 0 4 mg by mouth      sildenafil (VIAGRA) 100 mg tablet TK 1 T PO PRN (Patient not taking: Reported on 4/5/2022)  0     No current facility-administered medications for this visit  Objective:    /70   Pulse 76   Temp 98 °F (36 7 °C)   Resp 16   Ht 5' 7" (1 702 m)   Wt 83 5 kg (184 lb)   BMI 28 82 kg/m²        Physical Exam  Vitals and nursing note reviewed  Constitutional:       General: He is not in acute distress  Appearance: He is well-developed  He is not diaphoretic  HENT:      Head: Normocephalic and atraumatic  Right Ear: External ear normal       Left Ear: External ear normal       Nose: Nose normal       Mouth/Throat:      Pharynx: No oropharyngeal exudate  Eyes:      General: No scleral icterus  Right eye: No discharge  Left eye: No discharge  Pupils: Pupils are equal, round, and reactive to light  Neck:      Thyroid: No thyromegaly  Cardiovascular:      Rate and Rhythm: Normal rate  Heart sounds: Normal heart sounds  No murmur heard        Pulmonary:      Effort: Pulmonary effort is normal  No respiratory distress  Breath sounds: Normal breath sounds  No wheezing  Abdominal:      General: Bowel sounds are normal  There is no distension  Palpations: Abdomen is soft  There is no mass  Tenderness: There is no abdominal tenderness  There is no guarding or rebound  Musculoskeletal:         General: Normal range of motion  Skin:     General: Skin is warm and dry  Findings: No erythema or rash  Neurological:      Mental Status: He is alert        Coordination: Coordination normal       Deep Tendon Reflexes: Reflexes normal    Psychiatric:         Behavior: Behavior normal                 Brandon Castellano,

## 2022-04-07 NOTE — RESULT ENCOUNTER NOTE
Looks like the lipids are elevated, I would suggestlow fat and low cholesterol diet for 6 months and we should redraw the numbers in 6 months

## 2022-04-11 ENCOUNTER — HOSPITAL ENCOUNTER (OUTPATIENT)
Dept: RADIOLOGY | Facility: IMAGING CENTER | Age: 57
Discharge: HOME | End: 2022-04-11
Payer: COMMERCIAL

## 2022-04-11 DIAGNOSIS — Z92.3 HISTORY OF THERAPEUTIC RADIATION: ICD-10-CM

## 2022-04-11 DIAGNOSIS — C61 PROSTATE CANCER (HCC): ICD-10-CM

## 2022-04-11 PROCEDURE — 77080 DXA BONE DENSITY AXIAL: CPT

## 2022-04-12 LAB
ALBUMIN SERPL-MCNC: 4.5 G/DL (ref 3.6–5.1)
ALBUMIN/GLOB SERPL: 1.9 (CALC) (ref 1–2.5)
ALP SERPL-CCNC: 48 U/L (ref 35–144)
ALT SERPL-CCNC: 25 U/L (ref 9–46)
AST SERPL-CCNC: 23 U/L (ref 10–35)
BASOPHILS # BLD AUTO: 21 CELLS/UL (ref 0–200)
BASOPHILS NFR BLD AUTO: 0.9 %
BILIRUB SERPL-MCNC: 1.1 MG/DL (ref 0.2–1.2)
BUN SERPL-MCNC: 13 MG/DL (ref 7–25)
BUN/CREAT SERPL: ABNORMAL (CALC) (ref 6–22)
CALCIUM SERPL-MCNC: 10.2 MG/DL (ref 8.6–10.3)
CHLORIDE SERPL-SCNC: 102 MMOL/L (ref 98–110)
CHOLEST SERPL-MCNC: 217 MG/DL
CHOLEST/HDLC SERPL: 3.9 (CALC)
CO2 SERPL-SCNC: 33 MMOL/L (ref 20–32)
CREAT SERPL-MCNC: 0.89 MG/DL (ref 0.7–1.33)
EOSINOPHIL # BLD AUTO: 69 CELLS/UL (ref 15–500)
EOSINOPHIL NFR BLD AUTO: 3 %
ERYTHROCYTE [DISTWIDTH] IN BLOOD BY AUTOMATED COUNT: 13 % (ref 11–15)
GLOBULIN SER CALC-MCNC: 2.4 G/DL (CALC) (ref 1.9–3.7)
GLUCOSE SERPL-MCNC: 99 MG/DL (ref 65–99)
HCT VFR BLD AUTO: 36.7 % (ref 38.5–50)
HCV AB S/CO SERPL IA: 0.16
HCV AB SERPL QL IA: NORMAL
HDLC SERPL-MCNC: 56 MG/DL
HGB BLD-MCNC: 12.6 G/DL (ref 13.2–17.1)
LDLC SERPL CALC-MCNC: 130 MG/DL (CALC)
LYMPHOCYTES # BLD AUTO: 628 CELLS/UL (ref 850–3900)
LYMPHOCYTES NFR BLD AUTO: 27.3 %
MCH RBC QN AUTO: 30.9 PG (ref 27–33)
MCHC RBC AUTO-ENTMCNC: 34.3 G/DL (ref 32–36)
MCV RBC AUTO: 90 FL (ref 80–100)
MONOCYTES # BLD AUTO: 209 CELLS/UL (ref 200–950)
MONOCYTES NFR BLD AUTO: 9.1 %
NEUTROPHILS # BLD AUTO: 1373 CELLS/UL (ref 1500–7800)
NEUTROPHILS NFR BLD AUTO: 59.7 %
NONHDLC SERPL-MCNC: 161 MG/DL (CALC)
PLATELET # BLD AUTO: 177 THOUSAND/UL (ref 140–400)
PMV BLD REES-ECKER: 9.4 FL (ref 7.5–12.5)
POTASSIUM SERPL-SCNC: 4 MMOL/L (ref 3.5–5.3)
PROT SERPL-MCNC: 6.9 G/DL (ref 6.1–8.1)
RBC # BLD AUTO: 4.08 MILLION/UL (ref 4.2–5.8)
SL AMB EGFR AFRICAN AMERICAN: 110 ML/MIN/1.73M2
SL AMB EGFR NON AFRICAN AMERICAN: 95 ML/MIN/1.73M2
SODIUM SERPL-SCNC: 140 MMOL/L (ref 135–146)
TRIGL SERPL-MCNC: 169 MG/DL
WBC # BLD AUTO: 2.3 THOUSAND/UL (ref 3.8–10.8)

## 2022-04-15 ENCOUNTER — TELEPHONE (OUTPATIENT)
Dept: FAMILY MEDICINE CLINIC | Facility: CLINIC | Age: 57
End: 2022-04-15

## 2022-04-15 DIAGNOSIS — M79.2 NEUROPATHIC PAIN: Primary | ICD-10-CM

## 2022-04-15 RX ORDER — GABAPENTIN 300 MG/1
300 CAPSULE ORAL
Qty: 90 CAPSULE | Refills: 0 | Status: SHIPPED | OUTPATIENT
Start: 2022-04-15 | End: 2022-06-03 | Stop reason: SDUPTHER

## 2022-04-15 NOTE — TELEPHONE ENCOUNTER
Wife and pt and oncology team seems to think he has neuropathic symptoms, so we will trial him on gabapentin

## 2022-04-15 NOTE — TELEPHONE ENCOUNTER
Raúl Tereso is having side effects he thinks from radiation (prostate cancer) and is requesting to speak with Dr Laura Corcoran  He stated his nurse practioner wife wants to speak with dr Laura Corcoran Dr  To     He stated he needs to know if he needs to go to ER    Triage to [facility]

## 2022-04-15 NOTE — TELEPHONE ENCOUNTER
Patient wife stated that patient has been having trouble with sleep because of the pain that he is experiencing  The pain is keeping him up at night   I asked patient if they spoke to Oncology and they stated that they were referred back to Dr Neha La I told patient wife that patient will have to be seen if he is going to be given any medication and she stated that the patient was just seen for the same reason and that Dr Neha La is aware of this situation and she would like to speak to Dr Neha La  To please call back    Marilyn Jimenez MA

## 2022-05-24 ENCOUNTER — OFFICE VISIT (OUTPATIENT)
Dept: FAMILY MEDICINE CLINIC | Facility: CLINIC | Age: 57
End: 2022-05-24
Payer: COMMERCIAL

## 2022-05-24 VITALS
HEART RATE: 67 BPM | SYSTOLIC BLOOD PRESSURE: 130 MMHG | HEIGHT: 67 IN | DIASTOLIC BLOOD PRESSURE: 70 MMHG | WEIGHT: 193 LBS | OXYGEN SATURATION: 99 % | BODY MASS INDEX: 30.29 KG/M2 | TEMPERATURE: 97.5 F | RESPIRATION RATE: 16 BRPM

## 2022-05-24 DIAGNOSIS — M79.89 BILATERAL SWELLING OF FEET: Primary | ICD-10-CM

## 2022-05-24 PROCEDURE — 1036F TOBACCO NON-USER: CPT | Performed by: FAMILY MEDICINE

## 2022-05-24 PROCEDURE — 99213 OFFICE O/P EST LOW 20 MIN: CPT | Performed by: FAMILY MEDICINE

## 2022-05-24 PROCEDURE — 3008F BODY MASS INDEX DOCD: CPT | Performed by: FAMILY MEDICINE

## 2022-05-24 NOTE — PROGRESS NOTES
Assessment/Plan:       Diagnoses and all orders for this visit:    Bilateral swelling of feet  Possible side effect of lupron  He will discuss this with his urologist and oncologist  For now, I suggest leg elevation, exercise and compression stockings  Return if symptoms worsen or fail to improve  Subjective:      Patient ID: Freddie Glass is a 61 y o  male  HPI  Be Bacon is a 62 yo male with history of prostate cancer s/p completion of radiation and currently undergoing lupron injections who presents today for c/o arthralgias and swelling of his feet/ankles  He drives 50 miles to work every day  Was told by his oncologist that symptoms can be related to his lupron injections  The following portions of the patient's history were reviewed and updated as appropriate: allergies, current medications, past family history, past medical history, past social history, past surgical history and problem list     Review of Systems   Constitutional: Negative  HENT: Negative  Eyes: Negative  Respiratory: Negative  Cardiovascular: Negative  Gastrointestinal: Negative  Endocrine: Negative  Genitourinary: Negative  Musculoskeletal: Negative  Neurological: Negative  Hematological: Negative  Psychiatric/Behavioral: Negative  Objective:      /70   Pulse 67   Temp 97 5 °F (36 4 °C)   Resp 16   Ht 5' 7" (1 702 m)   Wt 87 5 kg (193 lb)   SpO2 99%   BMI 30 23 kg/m²          Physical Exam  Constitutional:       General: He is not in acute distress  Appearance: He is well-developed  He is not diaphoretic  HENT:      Head: Normocephalic and atraumatic  Eyes:      General: No scleral icterus  Right eye: No discharge  Left eye: No discharge  Conjunctiva/sclera: Conjunctivae normal    Cardiovascular:      Rate and Rhythm: Normal rate and regular rhythm  Pulses: Normal pulses     Pulmonary:      Effort: Pulmonary effort is normal  No respiratory distress  Breath sounds: Normal breath sounds  No stridor  No wheezing, rhonchi or rales  Chest:      Chest wall: No tenderness  Musculoskeletal:         General: Normal range of motion  Cervical back: Normal range of motion  Right lower leg: Edema (up to ankles) present  Left lower leg: Edema (up to ankles) present  Skin:     General: Skin is warm  Neurological:      Mental Status: He is alert and oriented to person, place, and time  Psychiatric:         Mood and Affect: Mood normal          Behavior: Behavior normal          Thought Content:  Thought content normal          Judgment: Judgment normal

## 2022-05-26 ENCOUNTER — FOLLOW UP (OUTPATIENT)
Dept: URBAN - METROPOLITAN AREA CLINIC 6 | Facility: CLINIC | Age: 57
End: 2022-05-26

## 2022-05-26 DIAGNOSIS — H40.023: ICD-10-CM

## 2022-05-26 DIAGNOSIS — H47.092: ICD-10-CM

## 2022-05-26 PROCEDURE — 92133 CPTRZD OPH DX IMG PST SGM ON: CPT

## 2022-05-26 PROCEDURE — 92250 FUNDUS PHOTOGRAPHY W/I&R: CPT

## 2022-05-26 PROCEDURE — 92020 GONIOSCOPY: CPT

## 2022-05-26 PROCEDURE — 76514 ECHO EXAM OF EYE THICKNESS: CPT

## 2022-05-26 PROCEDURE — 92012 INTRM OPH EXAM EST PATIENT: CPT

## 2022-05-26 PROCEDURE — 92083 EXTENDED VISUAL FIELD XM: CPT

## 2022-05-26 ASSESSMENT — TONOMETRY
OD_IOP_MMHG: 13
OS_IOP_MMHG: 11

## 2022-05-26 ASSESSMENT — VISUAL ACUITY
OD_CC: 20/30
OS_CC: 20/40-2

## 2022-06-03 DIAGNOSIS — M79.2 NEUROPATHIC PAIN: ICD-10-CM

## 2022-06-03 RX ORDER — GABAPENTIN 300 MG/1
300 CAPSULE ORAL
Qty: 90 CAPSULE | Refills: 0 | Status: SHIPPED | OUTPATIENT
Start: 2022-06-03 | End: 2022-06-06 | Stop reason: SDUPTHER

## 2022-06-06 ENCOUNTER — TELEPHONE (OUTPATIENT)
Dept: FAMILY MEDICINE CLINIC | Facility: CLINIC | Age: 57
End: 2022-06-06

## 2022-06-06 DIAGNOSIS — M79.2 NEUROPATHIC PAIN: ICD-10-CM

## 2022-06-06 RX ORDER — GABAPENTIN 300 MG/1
300 CAPSULE ORAL 3 TIMES DAILY
Qty: 270 CAPSULE | Refills: 0 | Status: SHIPPED | OUTPATIENT
Start: 2022-06-06 | End: 2022-06-06 | Stop reason: SDUPTHER

## 2022-06-06 NOTE — TELEPHONE ENCOUNTER
Pharmacist (Oswaldo)  left a message stating that [place] states that his Gabapentin 300 mg is three times daily, not once daily   They looking for a new rx with these directions Jami Adolph

## 2022-08-03 ENCOUNTER — TELEPHONE (OUTPATIENT)
Dept: FAMILY MEDICINE CLINIC | Facility: CLINIC | Age: 57
End: 2022-08-03

## 2022-08-03 NOTE — TELEPHONE ENCOUNTER
Pt has an apt with Dr Danni Lomas tomorrow but he dropped off paper work for him to see and for his chart here today  In nurse pending one

## 2022-08-04 ENCOUNTER — OFFICE VISIT (OUTPATIENT)
Dept: FAMILY MEDICINE CLINIC | Facility: CLINIC | Age: 57
End: 2022-08-04
Payer: COMMERCIAL

## 2022-08-04 VITALS
RESPIRATION RATE: 18 BRPM | HEART RATE: 74 BPM | HEIGHT: 67 IN | BODY MASS INDEX: 30.13 KG/M2 | SYSTOLIC BLOOD PRESSURE: 132 MMHG | DIASTOLIC BLOOD PRESSURE: 72 MMHG | OXYGEN SATURATION: 97 % | WEIGHT: 192 LBS | TEMPERATURE: 98.2 F

## 2022-08-04 DIAGNOSIS — I89.0 SECONDARY LYMPHEDEMA: Primary | ICD-10-CM

## 2022-08-04 DIAGNOSIS — M79.2 NEUROPATHIC PAIN: ICD-10-CM

## 2022-08-04 PROCEDURE — 99213 OFFICE O/P EST LOW 20 MIN: CPT | Performed by: FAMILY MEDICINE

## 2022-08-04 RX ORDER — GABAPENTIN 300 MG/1
CAPSULE ORAL
Qty: 360 CAPSULE | Refills: 0 | Status: SHIPPED | OUTPATIENT
Start: 2022-08-04 | End: 2022-11-02

## 2022-08-04 NOTE — PROGRESS NOTES
Assessment/Plan:    1  Secondary lymphedema  -     Ambulatory Referral to Physical Therapy; Future    2  Neuropathic pain  -     gabapentin (Neurontin) 300 mg capsule; Take 2 capsules (600 mg total) by mouth every morning AND 2 capsules (600 mg total) daily at bedtime  Gabapentin increase to help with pain  PT sent to lymphedema clinic    BMI Counseling: Body mass index is 30 07 kg/m²  The BMI is above normal  Nutrition recommendations include decreasing portion sizes  Exercise recommendations include moderate physical activity 150 minutes/week  No pharmacotherapy was ordered  Rationale for BMI follow-up plan is due to patient being overweight or obese  There are no Patient Instructions on file for this visit  Return for Next scheduled follow up  Subjective:      Patient ID: Raquel Stevens is a 61 y o  male  Chief Complaint   Patient presents with    form     Sas/cma       Pt is here with dissability paperwork to fill out  Pt states the division of pension need two doctors for paperwork to be filled out    Pt states he was seen on Thursday at [facility] by oncology and rehab medicine    Pt states he has swelling states his fingers swell and he has pain thar rad up his arm and forearm and causes pain  States at night he cries  Pt was sent to lymphedema physical therapy    Pt states driving is a big problem, his ankles swell, his ankles swell his feet swell  This is also causing pain    Pt is asking for an ioncrease of his gabapentin    At this moment his swelling is down      Pt does not wear compression stockings daily    Pt is ambulating w a cane       Pt states he cannot stand for any period of time         The following portions of the patient's history were reviewed and updated as appropriate: allergies, current medications, past family history, past medical history, past social history, past surgical history and problem list     Review of Systems   Respiratory: Negative for shortness of breath  Musculoskeletal: Positive for gait problem and myalgias  Current Outpatient Medications   Medication Sig Dispense Refill    acetaminophen (TYLENOL) 325 mg tablet Take 650 mg by mouth every 6 (six) hours as needed for mild pain      gabapentin (Neurontin) 300 mg capsule Take 2 capsules (600 mg total) by mouth every morning AND 2 capsules (600 mg total) daily at bedtime  360 capsule 0    tamsulosin (FLOMAX) 0 4 mg Take 0 4 mg by mouth       No current facility-administered medications for this visit  Objective:    /72   Pulse 74   Temp 98 2 °F (36 8 °C)   Resp 18   Ht 5' 7" (1 702 m)   Wt 87 1 kg (192 lb)   SpO2 97%   BMI 30 07 kg/m²        Physical Exam  Cardiovascular:      Rate and Rhythm: Normal rate and regular rhythm  Pulmonary:      Effort: No respiratory distress  Breath sounds: No stridor  No wheezing, rhonchi or rales  Chest:      Chest wall: No tenderness  Musculoskeletal:         General: Swelling present  Right lower leg: Edema present  Left lower leg: Edema present        Comments: Upper extremity edema B/L                Scarlett Newness, DO

## 2022-08-10 ENCOUNTER — EVALUATION (OUTPATIENT)
Dept: PHYSICAL THERAPY | Facility: CLINIC | Age: 57
End: 2022-08-10
Payer: COMMERCIAL

## 2022-08-10 DIAGNOSIS — I89.0 LYMPHEDEMA OF BOTH LOWER EXTREMITIES: Primary | ICD-10-CM

## 2022-08-10 DIAGNOSIS — Z85.46 HISTORY OF PROSTATE CANCER: ICD-10-CM

## 2022-08-10 PROCEDURE — 97535 SELF CARE MNGMENT TRAINING: CPT | Performed by: PHYSICAL THERAPIST

## 2022-08-10 PROCEDURE — 97162 PT EVAL MOD COMPLEX 30 MIN: CPT | Performed by: PHYSICAL THERAPIST

## 2022-08-10 NOTE — PROGRESS NOTES
PT Evaluation     Today's date: 8/10/2022  Patient name: Dylan Barnard  : 1965  MRN: [id number]  Referring provider: Suzan Moreno DO  Dx:   Encounter Diagnosis     ICD-10-CM    1  Lymphedema of both lower extremities  I89 0    2  History of prostate cancer  Z85 46                   Assessment  Assessment details: Dylan Barnard is a 61y o  year old male who presents to IE with:   No diagnosis found  Beatrice Rojas is a 61 y o  male presents to IE with symptoms consistent with lymphedema including: + stemmer's sign, skin fibrosis, mild pitting, and history of insult to lymphatic system from prostate cancer and adjuvant Lupron shots  Skilled PT treatment is advised utilizing full CDT protocol to include MLD, exercise, and skin management to manage lymphedema and optimize patient's functional potential  Compression is also recommended as part of CDT protocol to maintain reduction in swelling and provide support for lymphatic system, PT recommending grade 1 compression for patient  Patient may also benefit from home compression pump in future     Impairments: abnormal gait, abnormal or restricted ROM, abnormal movement, activity intolerance, impaired balance, impaired physical strength and lacks appropriate home exercise program  Other impairment: increased swelling, lacks knowledge of lymphedema and proper skin care  Functional limitations: Walking, getting up from a chair,Barriers to therapy: Prostate cancer with cyberknife treatment and Lupron shots  Understanding of Dx/Px/POC: good   Prognosis: fair  Prognosis details: Beatrice Rojas presents to IE with secondary bilateral LE lymphedema  ndGndrndanddndend:nd nd2nd Fibrosis present: None    Goals  STG  - Patient and/or caregiver will understand lymphedema precautions to decrease risk of infection and exacerbation of lymphedema  - Patient will develop a tolerance for wearing multi-layer, short stretch bandages betweens sessions to facilitate limb decongestion  - Patient will experience decrease in pitting edema which will improve tissue health and decrease risk of infection    - Patient will perform HEP independently or with minimal assistance to help improve lymphatic flow and venous return    LTG  - Patient will experience increased ROM and functional mobility to aid with ADL's such as walking, standing at time of discharge  - Patient will demonstrate more normalized gait pattern and improved balance at time of discharge  - Patient and/or caregiver will be independent with donning and doffing of compression garments which will enable regular daily garment wear at time of discharge, skin maintenance, and self- MLD   - Patient and/or caregiver will be independent with HEP and lymphedema management to prevent relapse and reduce risk of infection and hospitalizations at time of discharge    Plan  Plan details: Thank you for referring Zaynab Tierney to Physical Therapy at [place] [address] and for the opportunity to coordinate care  Patient would benefit from: lymphedema eval, PT eval and skilled physical therapy  Planned therapy interventions: manual therapy, massage, muscle pump exercises, neuromuscular re-education, orthotic fitting/training, orthotic management and training, patient education, self care, strengthening, stretching, therapeutic activities, therapeutic exercise, graded activity, dressing changes, breathing training, balance, gait training and home exercise program  Other planned therapy interventions: Complete decongestive therapy: Manual lymphatic drainage, compression bandaging, exercise, self-maintenance, and education on bandaging, skin care, and self-bandaging  Frequency: 2x week  Duration in visits: 10  Plan of Care beginning date: 8/10/2022  Treatment plan discussed with: patient        Subjective Evaluation    History of Present Illness  Mechanism of injury: Blaine Beckett presents to  with bilateral LE lymphedema  Diagnosed in: Prostate cancer October 2021   Patient reports history of elevated PSA for a few years  Patient had biopsy which revealed cancer to prostate  Patient reports diagnosed with stage 2 cancer reporting "it was aggressive, it had gone to 2 lymph nodes " Patient had cyberknife therapy reporting 31 days of treatment over course of 7 weeks  Patient reports "tolerated very well" for surgery  Patient reports some side effects of loss of pubic hair  Patient reports he also had hormone therapy for shot of Lupron  Patient reporting episode of "passing out" when having procedure and spasming  Patient reporting neuropathy in bilateral lower legs and has tried gabapentin  Patient went through series of 3 Lupron shots       Swelling history     - Previous treatment: None    - Currently wear a compression garment: Yes, Usage: wears daily  Loss of function/ mobility:     - Dressing: no     - Lifting: no     - Walking: yes      - Reaching feet and toes: no     - Bathing/ showering: no     - Preparing meals: no     General Medical History      - Smoking: no       - Hyperthyroidism: no   Gastrointestinal:      - Abdominal surgeries: no      - Crohn's disease: no      - Diverticulitis: no   Genitourinary       - Kidney dysfunction: no      - Chronic renal insufficiency: no      - Incontinence: no      - Saddle anesthesia: no      - Bowel and bladder changes: no     Cardiovascular/ Respiratory history     - HTN: no      - Asthma: no      - Bronchitis: no      - Irregular heartbeat: no      - Sleep apnea: no      - Diabetes: no      - Heart disease: no      - Regular follow-ups with cardiologist: no      - Family history of CVD: no      - Clotting disorders: no      - Varicose veins: no      - PMH wounds: no      - PMH DVT: no   Support: Wife  Goals: "get the swelling down "   Pain  At worst pain rating: 10  Location: B/L LE  Aggravating factors: walking, standing and stair climbing    Treatments  Current treatment: physical therapy  Patient Goals  Patient goals for therapy: decreased edema, increased strength, independence with ADLs/IADLs, improved balance, increased motion and return to sport/leisure activities          Objective     Observations   Left Knee   Positive for edema  Right Knee   Positive for edema  Additional Observation Details  Palpation:  Skin Mobility: WNL  Skin temperature: WNL  Skin color: hemosiderin staining not present  Pitting: Not present  Wound presence: no  Stemmer's Sign: None  Fungal infections: no  Hyperkeratosis: no   Lymphorrhea/ weeping: no  Papillomas: no   Lymph fistulas: no   Lymph cysts: no   Cellulitis: no   Lipodermatosclerosis: no       General Comments:       Ankle/Foot Comments   Gait: patient demonstrating antalgic gait when first getting up from a chair, ambulating with SPC    LOWER EXTREMITY LEFT CALCULATIONS    Flowsheet Row Most Recent Value   Volume LE (mL) 6254   Difference from last visit (mL)  -6254   Difference from first visit (mL)  -6254      LOWER EXTREMITY RIGHT CALCULATIONS    Flowsheet Row Most Recent Value   Volume LE (mL) 6344   Difference from last visit (mL)  -6344   Difference from first visit (mL)  -6344                  Precautions: Prostate cancer with cyberknife treatment and Lupron shots  HEP: review educational literature provided by PT, order short stretch bandages as highlighted by PT   Daily Treatment Diary     Manuals 8/10            B/L LE MLD sequence                                       Therapeutic Exercise             Bike              Hip abduction             Hip flexion             Heel raises             Toe raises             Lateral walking             Bridges                                                                                                        Self-care/ Home management             Lymphedema education  15' total

## 2022-08-11 ENCOUNTER — OFFICE VISIT (OUTPATIENT)
Dept: PHYSICAL THERAPY | Facility: CLINIC | Age: 57
End: 2022-08-11
Payer: COMMERCIAL

## 2022-08-11 DIAGNOSIS — Z85.46 HISTORY OF PROSTATE CANCER: ICD-10-CM

## 2022-08-11 DIAGNOSIS — I89.0 LYMPHEDEMA OF BOTH LOWER EXTREMITIES: Primary | ICD-10-CM

## 2022-08-11 PROCEDURE — 97140 MANUAL THERAPY 1/> REGIONS: CPT | Performed by: PHYSICAL THERAPIST

## 2022-08-15 ENCOUNTER — OFFICE VISIT (OUTPATIENT)
Dept: PHYSICAL THERAPY | Facility: CLINIC | Age: 57
End: 2022-08-15
Payer: COMMERCIAL

## 2022-08-15 DIAGNOSIS — Z85.46 HISTORY OF PROSTATE CANCER: ICD-10-CM

## 2022-08-15 DIAGNOSIS — I89.0 LYMPHEDEMA OF BOTH LOWER EXTREMITIES: Primary | ICD-10-CM

## 2022-08-15 PROCEDURE — 97110 THERAPEUTIC EXERCISES: CPT | Performed by: PHYSICAL THERAPIST

## 2022-08-15 PROCEDURE — 97140 MANUAL THERAPY 1/> REGIONS: CPT | Performed by: PHYSICAL THERAPIST

## 2022-08-15 NOTE — PROGRESS NOTES
Daily Note  IE 8/10/22     Today's date: 8/15/2022  Patient name: Tenisha Batres  : 1965  MRN: [id number]  Referring provider: Marli Thomas DO  Dx:   Encounter Diagnosis     ICD-10-CM    1  Lymphedema of both lower extremities  I89 0    2  History of prostate cancer  Z85 46                   Subjective: Didier reports "tingling in the hands and feet today" upon arrival to therapy today  Objective: See treatment diary below      Assessment: Tolerated treatment fair  Patient would benefit from continued PT  PT adding hamstring stretch and gastroc stretch today with fair tolerance, patient given updated HEP and encouraged to continue stretching while in car and at rest stops during car ride this week  Plan: Continue per plan of care  Progress treatment as tolerated                 Precautions: Prostate cancer with cyberknife treatment and Lupron shots  HEP: review educational literature provided by PT, order short stretch bandages as highlighted by PT   Daily Treatment Diary     Manuals 8/10 8/11 8/15          B/L LE MLD sequence  30' total  30' total          Measurements  Done                         Therapeutic Exercise             Bike              Hip abduction             Hip flexion             Heel raises             Toe raises             Lateral walking             Bridges             gastroc stretch   :10x3 ea          Hamstring stretch   :10x3 ea                                                                           Self-care/ Home management             Lymphedema education  15' total

## 2022-08-24 ENCOUNTER — OFFICE VISIT (OUTPATIENT)
Dept: PHYSICAL THERAPY | Facility: CLINIC | Age: 57
End: 2022-08-24
Payer: COMMERCIAL

## 2022-08-24 DIAGNOSIS — I89.0 LYMPHEDEMA OF BOTH LOWER EXTREMITIES: Primary | ICD-10-CM

## 2022-08-24 DIAGNOSIS — Z85.46 HISTORY OF PROSTATE CANCER: ICD-10-CM

## 2022-08-24 PROCEDURE — 97110 THERAPEUTIC EXERCISES: CPT | Performed by: PHYSICAL THERAPIST

## 2022-08-24 PROCEDURE — 97140 MANUAL THERAPY 1/> REGIONS: CPT | Performed by: PHYSICAL THERAPIST

## 2022-08-24 NOTE — PROGRESS NOTES
Daily Note  IE 8/10/22     Today's date: 2022  Patient name: Iraida Green  : 1965  MRN: [id number]  Referring provider: Manfred Hoffman DO  Dx:   Encounter Diagnosis     ICD-10-CM    1  Lymphedema of both lower extremities  I89 0    2  History of prostate cancer  Z85 46                   Subjective: Leslie Roque reports "the car ride was rough last week" upon arrival to therapy today  Objective: See treatment diary below      Assessment: Tolerated treatment fair  Patient would benefit from continued PT  PT adding heel raises and toe raises today in therapy with fair tolerance  Patient given updated HEP for hand glides today with verbalized understanding  Plan: Continue per plan of care  Progress treatment as tolerated                 Precautions: Prostate cancer with cyberknife treatment and Lupron shots  HEP: review educational literature provided by PT, order short stretch bandages as highlighted by PT   Daily Treatment Diary     Manuals 8/10 8/11 8/15 8/24         B/L LE MLD sequence  30' total  30' total 30' total          Measurements  Done                         Therapeutic Exercise             Bike              Hip abduction             Hip flexion             Heel raises    2x10         Toe raises    2x10         Lateral walking             Bridges             gastroc stretch   :10x3 ea :10x5         Hamstring stretch   :10x3 ea :10x5                                                                          Self-care/ Home management             Lymphedema education  15' total

## 2022-08-29 ENCOUNTER — OFFICE VISIT (OUTPATIENT)
Dept: PHYSICAL THERAPY | Facility: CLINIC | Age: 57
End: 2022-08-29
Payer: COMMERCIAL

## 2022-08-29 DIAGNOSIS — G62.9 NEUROPATHY: Primary | ICD-10-CM

## 2022-08-29 DIAGNOSIS — I89.0 LYMPHEDEMA OF BOTH LOWER EXTREMITIES: Primary | ICD-10-CM

## 2022-08-29 DIAGNOSIS — Z85.46 HISTORY OF PROSTATE CANCER: ICD-10-CM

## 2022-08-29 PROCEDURE — 97110 THERAPEUTIC EXERCISES: CPT | Performed by: PHYSICAL THERAPIST

## 2022-08-29 PROCEDURE — 97140 MANUAL THERAPY 1/> REGIONS: CPT | Performed by: PHYSICAL THERAPIST

## 2022-08-29 NOTE — PROGRESS NOTES
Daily Note  IE 8/10/22     Today's date: 2022  Patient name: Mo Levy  : 1965  MRN: [id number]  Referring provider: Madeline Gray DO  Dx:   Encounter Diagnosis     ICD-10-CM    1  Lymphedema of both lower extremities  I89 0    2  History of prostate cancer  Z85 46                   Subjective: Shiloh Shen reports "hands are really bothering me" upon arrival to therapy today  Objective: See treatment diary below      Assessment: Tolerated treatment fair  Patient would benefit from continued PT  PT discussing hand therapy today and PT messaging PCP for script for hand therapy referral  Continue to progress patient with LE strengthening  Plan: Continue per plan of care  Progress treatment as tolerated                 Precautions: Prostate cancer with cyberknife treatment and Lupron shots  HEP: review educational literature provided by PT, order short stretch bandages as highlighted by PT   Daily Treatment Diary     Manuals 8/10 8/11 8/15 8/24 8/29        B/L LE MLD sequence  30' total  30' total 30' total  30' total         Measurements  Done                         Therapeutic Exercise             Bike              Hip abduction             Hip flexion             Heel raises    2x10 2x10        Toe raises    2x10 2x10        Lateral walking             Bridges             gastroc stretch   :10x3 ea :10x5 :10x5        Hamstring stretch   :10x3 ea :10x5 :10x5                                                                         Self-care/ Home management             Lymphedema education  13' total

## 2022-08-31 ENCOUNTER — OFFICE VISIT (OUTPATIENT)
Dept: PHYSICAL THERAPY | Facility: CLINIC | Age: 57
End: 2022-08-31
Payer: COMMERCIAL

## 2022-08-31 DIAGNOSIS — I89.0 LYMPHEDEMA OF BOTH LOWER EXTREMITIES: Primary | ICD-10-CM

## 2022-08-31 DIAGNOSIS — Z85.46 HISTORY OF PROSTATE CANCER: ICD-10-CM

## 2022-08-31 PROCEDURE — 97140 MANUAL THERAPY 1/> REGIONS: CPT | Performed by: PHYSICAL THERAPIST

## 2022-08-31 PROCEDURE — 97110 THERAPEUTIC EXERCISES: CPT | Performed by: PHYSICAL THERAPIST

## 2022-08-31 NOTE — PROGRESS NOTES
Daily Note  IE 8/10/22     Today's date: 2022  Patient name: Braeden Vanegas  : 1965  MRN: [id number]  Referring provider: Reyes Forrest DO  Dx:   Encounter Diagnosis     ICD-10-CM    1  Lymphedema of both lower extremities  I89 0    2  History of prostate cancer  Z85 46                   Subjective: Regino Angle reports "hands are really bothering me" upon arrival to therapy today  Objective: See treatment diary below      Assessment: Tolerated treatment fair  Patient would benefit from continued PT  Patient able to tolerate all exercises with good technique  Patient to be scheduled for hand therapy in near future, PT receiving script for hand therapy by PCP  Continue to progress patient with LE strengthening  Plan: Continue per plan of care  Progress treatment as tolerated                 Precautions: Prostate cancer with cyberknife treatment and Lupron shots  HEP: review educational literature provided by PT, order short stretch bandages as highlighted by PT   Daily Treatment Diary     Manuals 8/10 8/11 8/15 8/24 8/29 8/31       B/L LE MLD sequence  30' total  30' total 30' total  30' total  30' total        Measurements  Done                         Therapeutic Exercise             Bike              Hip abduction             Hip flexion             Heel raises    2x10 2x10 2x10       Toe raises    2x10 2x10 2x10       Lateral walking      3 laps       Bridges             gastroc stretch   :10x3 ea :10x5 :10x5 :10x5       Hamstring stretch   :10x3 ea :10x5 :10x5 :10x5                                                                        Self-care/ Home management             Lymphedema education  13' total

## 2022-09-07 ENCOUNTER — OFFICE VISIT (OUTPATIENT)
Dept: PHYSICAL THERAPY | Facility: CLINIC | Age: 57
End: 2022-09-07
Payer: COMMERCIAL

## 2022-09-07 DIAGNOSIS — I89.0 LYMPHEDEMA OF BOTH LOWER EXTREMITIES: Primary | ICD-10-CM

## 2022-09-07 DIAGNOSIS — Z85.46 HISTORY OF PROSTATE CANCER: ICD-10-CM

## 2022-09-07 PROCEDURE — 97140 MANUAL THERAPY 1/> REGIONS: CPT | Performed by: PHYSICAL THERAPIST

## 2022-09-07 PROCEDURE — 97110 THERAPEUTIC EXERCISES: CPT | Performed by: PHYSICAL THERAPIST

## 2022-09-07 NOTE — PROGRESS NOTES
Daily Note  IE 8/10/22     Today's date: 2022  Patient name: Mo Levy  : 1965  MRN: [id number]  Referring provider: Madeline Gray DO  Dx:   Encounter Diagnosis     ICD-10-CM    1  Lymphedema of both lower extremities  I89 0    2  History of prostate cancer  Z85 46                   Subjective: Shiloh Shen reports "hands are really bothering me, I see the OT next week" upon arrival to therapy today  Objective: See treatment diary below      Assessment: Tolerated treatment fair  Patient would benefit from continued PT  Patient progressed with STS with foam under feet with patient reporting some soreness in bilateral calves  Continues to challenge patient with LE strengthening and proprioceptive exercises  Continue to progress patient with LE strengthening  Plan: Continue per plan of care  Progress treatment as tolerated                 Precautions: Prostate cancer with cyberknife treatment and Lupron shots  HEP: review educational literature provided by PT, order short stretch bandages as highlighted by PT   Daily Treatment Diary     Manuals 8/10 8/11 8/15 8/24 8/29 8/31 9/7      B/L LE MLD sequence  30' total  30' total 30' total  30' total  30' total  25' total       Measurements  Done                         Therapeutic Exercise             Bike              Hip abduction             Hip flexion             Heel raises    2x10 2x10 2x10 2x10      Toe raises    2x10 2x10 2x10 2x10      Lateral walking      3 laps 3 laps      Bridges             gastroc stretch   :10x3 ea :10x5 :10x5 :10x5 :10x5      Hamstring stretch   :10x3 ea :10x5 :10x5 :10x5 :10x5      STS foam feet       15x                                                          Self-care/ Home management             Lymphedema education  13' total

## 2022-09-13 ENCOUNTER — EVALUATION (OUTPATIENT)
Dept: OCCUPATIONAL THERAPY | Facility: CLINIC | Age: 57
End: 2022-09-13
Payer: COMMERCIAL

## 2022-09-13 DIAGNOSIS — G62.9 PERIPHERAL POLYNEUROPATHY: Primary | ICD-10-CM

## 2022-09-13 PROCEDURE — 97165 OT EVAL LOW COMPLEX 30 MIN: CPT

## 2022-09-13 PROCEDURE — 97110 THERAPEUTIC EXERCISES: CPT

## 2022-09-13 NOTE — PROGRESS NOTES
OT Evaluation     Today's date: 2022  Patient name: Ani Mcnair  : 1965  MRN: [id number]  Referring provider: Karmen Rose DO  Dx:   Encounter Diagnosis     ICD-10-CM    1  Peripheral polyneuropathy  G62 9        Start Time: 1630  Stop Time: 5463  Total time in clinic (min): 45 minutes    Assessment  Assessment details: Patient is a 61 y o  RHD male who presents for OT IE and treatment for bilateral peripheral neuropathy of hands   Patient reports bilateral numbness, generalized weakness, and increased pain at night secondary to difficulty with completing daily activities/ADLs with bilateral hands  Patient notes having difficult opening jars, mouth wash bottles, and water cap bottles, etc  Patient referred by Dr Brendan Ryder  to initiate treatment including hand therapy  Impairments: abnormal or restricted ROM, activity intolerance, impaired physical strength and pain with function    Symptom irritability: moderateUnderstanding of Dx/Px/POC: good   Prognosis: good    Goals  Short Term Goals by 2 - 4 weeks:    Establish HEP to increase performance with daily activities  Patient will increase BUE functional  strength by at least 5 lbs to assist in completing ADLs  Patient will increase ROM of BUE by at least 5 to 10 degrees to complete ADLs  Patient will decrease pain rate of BUE by at least 2 points per the VAS scale  Long Term Goals by discharge:    Establish final home exercise program to enhance maximal functional level with ADLs  Patient will have BUE functional  strength to complete all ADLs  Patient will be independent in using BUE  to complete all ADLs with minimal to no complaints of pain           Plan  Patient would benefit from: OT eval and skilled occupational therapy  Planned therapy interventions: therapeutic activities, strengthening, stretching, therapeutic exercise, functional ROM exercises, fine motor coordination training, graded activity, graded exercise, neuromuscular re-education, home exercise program, patient education and flexibility  Frequency: 1x week  Duration in weeks: 10  Plan of Care beginning date: 2022  Plan of Care expiration date: 2022  Treatment plan discussed with: patient        Subjective Evaluation    History of Present Illness  Mechanism of injury: Patient is a 61 y o  RHD male who presents for OT IE and treatment for bilateral peripheral neuropathy of hands  Patient reports bilateral numbness, generalized weakness, and increased pain at night secondary to difficulty with completing daily activities/ADLs with bilateral hands  Patient notes having difficult opening jars, mouth wash bottles, and water cap bottles, etc  Patient referred by Dr Dejan Tarango  to initiate treatment including hand therapy          Quality of life: good    Pain  Current pain ratin  At best pain ratin  At worst pain ratin  Quality: needle-like, throbbing and burning  Relieving factors: heat and rest    Social Support  Lives with: significant other    Employment status: working (Teacher)  Hand dominance: right    Treatments  Current treatment: physical therapy and occupational therapy  Patient Goals  Patient goals for therapy: decreased pain, increased strength, independence with ADLs/IADLs, return to sport/leisure activities and increased motion          Objective     Active Range of Motion     Left Wrist   Wrist flexion: 48 degrees   Wrist extension: 62 degrees   Radial deviation: 24 degrees   Ulnar deviation: 40 degrees     Right Wrist   Wrist flexion: 70 degrees   Wrist extension: 49 degrees   Radial deviation: 15 degrees   Ulnar deviation: 32 degrees     Strength/Myotome Testing     Left Wrist/Hand      (2nd hand position)     Trial 1: 15    Thumb Strength  Key/Lateral Pinch     Trial 1: 12    Right Wrist/Hand      (2nd hand position)     Trial 1: 16    Thumb Strength   Key/Lateral Pinch     Trial 1: 12 Visit Tracker: No AUTH Req  60 visits combined PT/OT  Date 9/13  IE                                                                                      Precautions: N/A       Manuals HEP 9/14/2022                         Ther Ex     Wrist AROM x10 x10   Wrist Flex/Ext/RD x10 x10   Wrist UD/RD x10 x10   Flexor Tendon Gliding   (Active Full Fist/Active Hook Fist) x10 x10   Table top digit  Extension x10 x10        Ther Activity                                   Modalities     Moist Heat x5 minutes x5 minutes            Assessment:   Patient tolerated session well  Patient demonstrates ROM and strength deficits during assessment this date  This session included education on the anatomy and physiology of the dx, techniques for decreased deficits through HEP and appropriate use of modalities  Patient educated on HEP to include ROM TE with verbal and written instructions on handout for patient to reference  Patient educated on current treatment plan at this time of 2 x per week  Patient benefiting from skilled OT hand therapy to reduce deficits to improve independence with daily activities with minimal to no complaints of pain  Plan:   Focus on AROM to improve ability to complete daily activites with ease   POC 09/13/22 - 11/22/22

## 2022-09-14 ENCOUNTER — OFFICE VISIT (OUTPATIENT)
Dept: PHYSICAL THERAPY | Facility: CLINIC | Age: 57
End: 2022-09-14
Payer: COMMERCIAL

## 2022-09-14 DIAGNOSIS — I89.0 LYMPHEDEMA OF BOTH LOWER EXTREMITIES: Primary | ICD-10-CM

## 2022-09-14 DIAGNOSIS — Z85.46 HISTORY OF PROSTATE CANCER: ICD-10-CM

## 2022-09-14 PROCEDURE — 97140 MANUAL THERAPY 1/> REGIONS: CPT | Performed by: PHYSICAL THERAPIST

## 2022-09-14 PROCEDURE — 97110 THERAPEUTIC EXERCISES: CPT | Performed by: PHYSICAL THERAPIST

## 2022-09-14 NOTE — PROGRESS NOTES
Daily Note  IE 8/10/22     Today's date: 2022  Patient name: Dylan Barnard  : 1965  MRN: [id number]  Referring provider: Suzan Moreno DO  Dx:   Encounter Diagnosis     ICD-10-CM    1  Lymphedema of both lower extremities  I89 0    2  History of prostate cancer  Z85 46                   Subjective: Didier reports "hands felt better after hand therapy yesterday "          Objective: See treatment diary below      Assessment: Tolerated treatment fair  Patient would benefit from continued PT  PT adding tandem walking with minimal instability observed, continue to challenge patient with additional balance exercises as able next visit  Continue to progress patient with LE strengthening  Plan: Continue per plan of care  Progress treatment as tolerated                 Precautions: Prostate cancer with cyberknife treatment and Lupron shots  HEP: review educational literature provided by PT, order short stretch bandages as highlighted by PT   Daily Treatment Diary     Manuals 8/10 8/11 8/15 8/24 8/29 8/31 9/7 9/14     B/L LE MLD sequence  30' total  30' total 30' total  30' total  30' total  25' total  25' total      Measurements  Done                         Therapeutic Exercise             Bike              Hip abduction             Hip flexion             Heel raises    2x10 2x10 2x10 2x10 2x10     Toe raises    2x10 2x10 2x10 2x10 2x10     Lateral walking      3 laps 3 laps 3 laps      Bridges             gastroc stretch   :10x3 ea :10x5 :10x5 :10x5 :10x5 :10x5     Hamstring stretch   :10x3 ea :10x5 :10x5 :10x5 :10x5 :10x5     STS foam feet       15x 15x     Tandem walking        3 laps                                            Self-care/ Home management             Lymphedema education  13' total

## 2022-09-19 ENCOUNTER — APPOINTMENT (OUTPATIENT)
Dept: PHYSICAL THERAPY | Facility: CLINIC | Age: 57
End: 2022-09-19
Payer: COMMERCIAL

## 2022-09-20 ENCOUNTER — OFFICE VISIT (OUTPATIENT)
Dept: OCCUPATIONAL THERAPY | Facility: CLINIC | Age: 57
End: 2022-09-20
Payer: COMMERCIAL

## 2022-09-20 DIAGNOSIS — G62.9 PERIPHERAL POLYNEUROPATHY: Primary | ICD-10-CM

## 2022-09-20 PROCEDURE — 97530 THERAPEUTIC ACTIVITIES: CPT

## 2022-09-20 PROCEDURE — 97110 THERAPEUTIC EXERCISES: CPT

## 2022-09-20 NOTE — PROGRESS NOTES
Daily Note     Today's date: 2022  Patient name: Ion Thompson  : 1965  MRN: [id number]  Referring provider: Sanjay Tsang DO  Dx:   Encounter Diagnosis     ICD-10-CM    1  Peripheral polyneuropathy  G62 9        Start Time: 1630  Stop Time: 1715  Total time in clinic (min): 45 minutes    Subjective:   Patient reported compliance with HEP  Patient further reported no other functional deficits this date  Objective: See treatment diary below    Visit Tracker: No AUTH Req  60 visits combined PT/OT  Date   IE                                                                                      Precautions: N/A       Manuals HEP 2022                         Ther Ex     Wrist AROM x10 x10   Wrist Flex/Ext/RD x10 x10   Wrist UD/RD x10 x10   Flexor Tendon Gliding   (Active Full Fist/Active Hook Fist) x10 x10   Table top digit  Extension x10 x10   Towel scrunches x10 x10        Ther Activity     Abductor digits with foam blocks  x5 green   Boading balls  x10 CW/CCW each way   Sponge  x5 blue x5 blue                  Modalities     Moist Heat x5 minutes x5 minutes            Assessment:   Patient tolerated session well  Session focused on HEP education, range of motion, stretching, patient education, and gentle strengthening to improve ability to complete ADLs with ease  Patient tolerated all TE/TA with min complaints of stiffness, no pain  Patient progressing well towards goals  Patient benefiting from skilled hand therapy OT to reduce deficits to improve independence with daily activities  Plan:   Focus on AROM to improve ability to complete daily activites with ease   POC 22 - 22

## 2022-09-21 ENCOUNTER — OFFICE VISIT (OUTPATIENT)
Dept: PHYSICAL THERAPY | Facility: CLINIC | Age: 57
End: 2022-09-21
Payer: COMMERCIAL

## 2022-09-21 DIAGNOSIS — Z85.46 HISTORY OF PROSTATE CANCER: ICD-10-CM

## 2022-09-21 DIAGNOSIS — I89.0 LYMPHEDEMA OF BOTH LOWER EXTREMITIES: Primary | ICD-10-CM

## 2022-09-21 PROCEDURE — 97140 MANUAL THERAPY 1/> REGIONS: CPT | Performed by: PHYSICAL THERAPIST

## 2022-09-21 NOTE — PROGRESS NOTES
Daily Note  IE 8/10/22     Today's date: 2022  Patient name: Urszula Lou  : 1965  MRN: [id number]  Referring provider: Fatemeh Garcia DO  Dx:   Encounter Diagnosis     ICD-10-CM    1  Lymphedema of both lower extremities  I89 0    2  History of prostate cancer  Z85 46                   Subjective: Sheryle Seal reports "okay" upon arrival to therapy today  Objective: See treatment diary below      Assessment: Tolerated treatment fair  Patient would benefit from continued PT  Patient requesting to leave 15 early due to personal matter , as a result exercises limited today, resume as able next visit  Continue to progress patient with LE strengthening  Plan: Continue per plan of care  Progress treatment as tolerated                 Precautions: Prostate cancer with cyberknife treatment and Lupron shots  HEP: review educational literature provided by PT, order short stretch bandages as highlighted by PT   Daily Treatment Diary     Manuals 8/10 8/11 8/15 8/24 8/29 8/31 9/7 9/14 9/21    B/L LE MLD sequence  30' total  30' total 30' total  30' total  30' total  25' total  25' total  25' total     Measurements  Done                         Therapeutic Exercise             Bike              Hip abduction             Hip flexion             Heel raises    2x10 2x10 2x10 2x10 2x10     Toe raises    2x10 2x10 2x10 2x10 2x10     Lateral walking      3 laps 3 laps 3 laps      Bridges             gastroc stretch   :10x3 ea :10x5 :10x5 :10x5 :10x5 :10x5     Hamstring stretch   :10x3 ea :10x5 :10x5 :10x5 :10x5 :10x5     STS foam feet       15x 15x     Tandem walking        3 laps                                            Self-care/ Home management             Lymphedema education  13' total

## 2022-09-27 ENCOUNTER — OFFICE VISIT (OUTPATIENT)
Dept: OCCUPATIONAL THERAPY | Facility: CLINIC | Age: 57
End: 2022-09-27
Payer: COMMERCIAL

## 2022-09-27 DIAGNOSIS — G62.9 PERIPHERAL POLYNEUROPATHY: Primary | ICD-10-CM

## 2022-09-27 PROCEDURE — 97110 THERAPEUTIC EXERCISES: CPT

## 2022-09-27 PROCEDURE — 97530 THERAPEUTIC ACTIVITIES: CPT

## 2022-09-27 NOTE — PROGRESS NOTES
Daily Note     Today's date: 2022  Patient name: Alirio Alcaraz  : 1965  MRN: [id number]  Referring provider: Paul Whiteside DO  Dx:   Encounter Diagnosis     ICD-10-CM    1  Peripheral polyneuropathy  G62 9        Start Time: 1630  Stop Time: 7392  Total time in clinic (min): 45 minutes    Subjective: "Jess noticed a difference with my hands at night I have less numbness  I am happy with my results so far"  Patient reported continued compliance with HEP  Objective: See treatment diary below    Visit Tracker: No AUTH Req  60 visits combined PT/OT  Date   IE                                                                                     Precautions: Universal; Ambulation with cane; Fall Risk       Manuals HEP 2022                         Ther Ex     B/l Wrist AROM x10 x10   B/l Wrist Flex/Ext/RD x10 x10   B/l Wrist UD/RD x10 x10   B/l Flexor Tendon Gliding   (Active Full Fist/Active Hook Fist) x10 x10   B/l Table top digit  Extension x10 x10   B/l Towel scrunches x10 x10   B/l Dice Opposition      Digit extension with rubber band x10 red X10 red         Ther Activity     B/l Abductor digits with foam blocks  x5 green   B/l Boading balls (small)  x10 CW/CCW each way   B/l Sponge  x5 blue    B/l Juxtaciser  x5    B/l Clothes pin with dice  x10 yellow each hand         Modalities     B/l Moist Heat x5 minutes x5 minutes            Assessment:   Patient tolerated session well  Session focused on HEP education, range of motion, stretching, patient education, and gentle strengthening to improve ability to complete ADLs with ease  Patient tolerated all TE/TA with min complaints of stiffness, no pain  Patient progressing well towards goals  Patient benefiting from skilled hand therapy OT to reduce deficits to improve independence with daily activities  Plan:   Focus on AROM to improve ability to complete daily activites with ease   POC 22 - 22

## 2022-10-04 ENCOUNTER — OFFICE VISIT (OUTPATIENT)
Dept: OCCUPATIONAL THERAPY | Facility: CLINIC | Age: 57
End: 2022-10-04
Payer: COMMERCIAL

## 2022-10-04 DIAGNOSIS — G62.9 PERIPHERAL POLYNEUROPATHY: Primary | ICD-10-CM

## 2022-10-04 PROCEDURE — 97530 THERAPEUTIC ACTIVITIES: CPT

## 2022-10-04 PROCEDURE — 97110 THERAPEUTIC EXERCISES: CPT

## 2022-10-04 NOTE — PROGRESS NOTES
Daily Note     Today's date: 10/4/2022  Patient name: Malik Long  : 1965  MRN: [id number]  Referring provider: Giovani Heath DO  Dx:   Encounter Diagnosis     ICD-10-CM    1  Peripheral polyneuropathy  G62 9        Start Time: 1630  Stop Time: 1714  Total time in clinic (min): 45 minutes    Subjective: Patient reports no functional changes this date  Objective: See treatment diary below    Visit Tracker: No AUTH Req  60 visits combined PT/OT  Date   IE 9/20 9/27 10/4                                                                                   Precautions: Universal; Ambulation with cane; Fall Risk       Manuals HEP 10/4/2022                         Ther Ex     B/l Wrist AROM x10 x10   B/l Wrist Flex/Ext/RD x10 x10   B/l Wrist UD/RD x10 x10   B/l Flexor Tendon Gliding   (Active Full Fist/Active Hook Fist) x10 x10   B/l Table top digit  Extension x10 x10   B/l Towel scrunches x10 x10   B/l Dice Opposition/ in hand manipulation      Digit extension with rubber band x10 red X10 red         Ther Activity     B/l Abductor digits with foam blocks  x5 green   B/l Boading balls (small)  x10 CW/CCW each way   B/l Sponge  x5 blue    B/l Juxtaciser  x5    B/l Clothes pin with dice  x20 red each hand         Modalities     B/l Moist Heat x5 minutes x5 minutes            Assessment:   Patient tolerated session well  Session focused on HEP education, range of motion, stretching, patient education, and gentle strengthening to improve ability to complete ADLs with ease  Patient tolerated all TE/TA with min complaints of stiffness, no pain  Patient progressing well towards goals  Patient benefiting from skilled hand therapy OT to reduce deficits to improve independence with daily activities  Plan:   Focus on AROM to improve ability to complete daily activites with ease   POC 22 - 22

## 2022-10-11 ENCOUNTER — OFFICE VISIT (OUTPATIENT)
Dept: OCCUPATIONAL THERAPY | Facility: CLINIC | Age: 57
End: 2022-10-11
Payer: COMMERCIAL

## 2022-10-11 DIAGNOSIS — G62.9 PERIPHERAL POLYNEUROPATHY: Primary | ICD-10-CM

## 2022-10-11 PROCEDURE — 97530 THERAPEUTIC ACTIVITIES: CPT

## 2022-10-11 PROCEDURE — 97110 THERAPEUTIC EXERCISES: CPT

## 2022-10-11 NOTE — PROGRESS NOTES
Daily Note     Today's date: 10/11/2022  Patient name: Lucinda Francis  : 1965  MRN: [id number]  Referring provider: Lupillo Vanegas DO  Dx:   Encounter Diagnosis     ICD-10-CM    1  Peripheral polyneuropathy  G62 9        Start Time: 1630  Stop Time: 53  Total time in clinic (min): 45 minutes    Subjective: "I keep them active my hands  The problem is at night time, where my fingers do swell and the pain over night, same thing with my legs"  Objective: See treatment diary below    Visit Tracker: No AUTH Req  60 visits combined PT/OT  Date   IE 9/20 9/27 10/4 10/11                                                                                  Precautions: Universal; Ambulation with cane; Fall Risk       Manuals HEP 10/11/2022                         Ther Ex     B/l Wrist AROM x10 x10   B/l Wrist Flex/Ext/RD x10 x10   B/l Wrist UD/RD x10 x10   B/l Flexor Tendon Gliding   (Active Full Fist/Active Hook Fist) x10 x10   B/l Table top digit  Extension x10 x10   B/l Towel scrunches x10 x10   B/l Dice Opposition/ in hand manipulation   x12   Digit extension with rubber band x10 red X10 red         Ther Activity     B/l Abductor digits with foam blocks  x5 green   B/l Boading balls (small)  x10 CW/CCW each way   B/l Sponge  x5 blue    B/l Juxtaciser  x5    B/l Clothes pin with dice  x20 red each hand    Wrist rotator  x5 CW/CCW each way   B/L Gripper  x5 level 1    B/L Wrist PREs  x10 1lb        Modalities     B/l Moist Heat x5 minutes x5 minutes            Assessment:   Patient tolerated session well  Session focused on HEP education, range of motion, stretching, patient education, and gentle strengthening to improve ability to complete ADLs with ease  Patient tolerated all TE/TA with min complaints of stiffness, no pain  Patient tolerated initiation of gentle strengthening this date with no complaints of pain  Patient progressing well towards goals   Patient benefiting from skilled hand therapy OT to reduce deficits to improve independence with daily activities  Plan:   Focus on AROM to improve ability to complete daily activites with ease   POC 09/13/22 - 11/22/22

## 2022-10-12 ENCOUNTER — OFFICE VISIT (OUTPATIENT)
Dept: PHYSICAL THERAPY | Facility: CLINIC | Age: 57
End: 2022-10-12
Payer: COMMERCIAL

## 2022-10-12 DIAGNOSIS — Z85.46 HISTORY OF PROSTATE CANCER: ICD-10-CM

## 2022-10-12 DIAGNOSIS — I89.0 LYMPHEDEMA OF BOTH LOWER EXTREMITIES: Primary | ICD-10-CM

## 2022-10-12 PROCEDURE — 97140 MANUAL THERAPY 1/> REGIONS: CPT | Performed by: PHYSICAL THERAPIST

## 2022-10-12 NOTE — PROGRESS NOTES
Daily Note  IE 8/10/22     Today's date: 10/12/2022  Patient name: Gisela Guardado  : 1965  MRN: [id number]  Referring provider: Gulshan Frerer DO  Dx:   Encounter Diagnosis     ICD-10-CM    1  Lymphedema of both lower extremities  I89 0    2  History of prostate cancer  Z85 46                   Subjective: Didier reports "legs feel tight and painful" upon arrival to therapy today  PT encouraging patient to follow-up with PCP in regards to pain  Objective: See treatment diary below      Assessment: Tolerated treatment fair  Patient would benefit from continued PT  Patient requesting to leave 15 early due to personal matter , as a result exercises limited today, resume as able next visit  Continue to progress patient with LE strengthening  Plan: Continue per plan of care  Progress treatment as tolerated                 Precautions: Prostate cancer with cyberknife treatment and Lupron shots  HEP: review educational literature provided by PT, order short stretch bandages as highlighted by PT   Daily Treatment Diary     Manuals 8/10 8/11 8/15 8/24 8/29 8/31 9/7 9/14 9/21 10/12   B/L LE MLD sequence  30' total  30' total 30' total  30' total  30' total  25' total  25' total  25' total  30' total    Measurements  Done                         Therapeutic Exercise             Bike              Hip abduction             Hip flexion             Heel raises    2x10 2x10 2x10 2x10 2x10     Toe raises    2x10 2x10 2x10 2x10 2x10     Lateral walking      3 laps 3 laps 3 laps      Bridges             gastroc stretch   :10x3 ea :10x5 :10x5 :10x5 :10x5 :10x5     Hamstring stretch   :10x3 ea :10x5 :10x5 :10x5 :10x5 :10x5     STS foam feet       15x 15x     Tandem walking        3 laps                                            Self-care/ Home management             Lymphedema education  13' total

## 2022-10-18 ENCOUNTER — OFFICE VISIT (OUTPATIENT)
Dept: OCCUPATIONAL THERAPY | Facility: CLINIC | Age: 57
End: 2022-10-18
Payer: COMMERCIAL

## 2022-10-18 DIAGNOSIS — G62.9 PERIPHERAL POLYNEUROPATHY: Primary | ICD-10-CM

## 2022-10-18 PROCEDURE — 97530 THERAPEUTIC ACTIVITIES: CPT

## 2022-10-18 PROCEDURE — 97110 THERAPEUTIC EXERCISES: CPT

## 2022-10-18 NOTE — PROGRESS NOTES
Daily Note     Today's date: 10/18/2022  Patient name: Felisha Erazo  : 1965  MRN: [id number]  Referring provider: Telma Courtney DO  Dx:   Encounter Diagnosis     ICD-10-CM    1  Peripheral polyneuropathy  G62 9        Start Time: 1630  Stop Time: 60  Total time in clinic (min): 45 minutes    Subjective: Patient reports no functional changes this date  Objective: See treatment diary below    Visit Tracker: No AUTH Req  60 visits combined PT/OT  Date   IE 9/20 9/27 10/4 10/11 10/18                                                                                 Precautions: Universal; Ambulation with cane; Fall Risk       Manuals HEP 10/18/2022                    Ther Ex     B/l Wrist AROM x10 x10   B/l Wrist Flex/Ext/RD x10 x10   B/l Wrist UD/RD x10 x10   B/l Flexor Tendon Gliding   (Active Full Fist/Active Hook Fist) x10 x10   B/l Table top digit  Extension x10 x10   B/l Towel scrunches x10 x10   B/l Dice Opposition/ in hand manipulation   x25   Digit extension with rubber band x10 red X15 red         Ther Activity     B/l Abductor digits with foam blocks     B/l Boading balls (small)  x10 CW/CCW each way   B/l Sponge  x5 blue    B/l Juxtaciser  x5    B/l Clothes pin with dice  x25 red each hand    Wrist rotator  x5 CW/CCW each way   B/L Gripper  x10 level 2    B/L Wrist PREs  x10 2 lbs         Modalities     B/l Moist Heat x5 minutes x5 minutes            Assessment:   Patient tolerated session well  Session focused on HEP education, range of motion, stretching, patient education, and gentle strengthening to improve ability to complete ADLs with ease  Patient tolerated all TE/TA with min complaints of stiffness, no pain  Patient progressing well towards goals  Patient benefiting from skilled hand therapy OT to reduce deficits to improve independence with daily activities  Plan:   Focus on AROM to improve ability to complete daily activites with ease   POC 22 - 22

## 2022-10-19 ENCOUNTER — OFFICE VISIT (OUTPATIENT)
Dept: PHYSICAL THERAPY | Facility: CLINIC | Age: 57
End: 2022-10-19
Payer: COMMERCIAL

## 2022-10-19 DIAGNOSIS — Z85.46 HISTORY OF PROSTATE CANCER: ICD-10-CM

## 2022-10-19 DIAGNOSIS — I89.0 LYMPHEDEMA OF BOTH LOWER EXTREMITIES: Primary | ICD-10-CM

## 2022-10-19 PROCEDURE — 97112 NEUROMUSCULAR REEDUCATION: CPT | Performed by: PHYSICAL THERAPIST

## 2022-10-19 PROCEDURE — 97110 THERAPEUTIC EXERCISES: CPT | Performed by: PHYSICAL THERAPIST

## 2022-10-19 PROCEDURE — 97140 MANUAL THERAPY 1/> REGIONS: CPT | Performed by: PHYSICAL THERAPIST

## 2022-10-19 NOTE — PROGRESS NOTES
Daily Note  IE 8/10/22     Today's date: 10/19/2022  Patient name: Zaida Betancourt  : 1965  MRN: [id number]  Referring provider: Salena Pettit DO  Dx:   Encounter Diagnosis     ICD-10-CM    1  Lymphedema of both lower extremities  I89 0    2  History of prostate cancer  Z85 46                   Subjective: Didier reports "legs feel tight and painful" upon arrival to therapy today  PT encouraging patient to follow-up with PCP in regards to pain  Objective: See treatment diary below      Assessment: Tolerated treatment fair  Patient would benefit from continued PT  Patient resuming exercises today, PT adding resistance for lateral walking and new addition of monster walking today with fair tolerance  Continue to progress patient with LE strengthening  Plan: Continue per plan of care  Progress treatment as tolerated                 Precautions: Prostate cancer with cyberknife treatment and Lupron shots  HEP: review educational literature provided by PT, order short stretch bandages as highlighted by PT   Daily Treatment Diary     Manuals 10/19 8/11 8/15 8/24 8/29 8/31 9/7 9/14 9/21 10/12   B/L LE MLD sequence 25' total  30' total  30' total 30' total  30' total  30' total  25' total  25' total  25' total  30' total    Measurements  Done                         Therapeutic Exercise             Bike              Hip abduction             Hip flexion             Heel raises 2x10   2x10 2x10 2x10 2x10 2x10     Toe raises 2x10   2x10 2x10 2x10 2x10 2x10     Lateral walking RTB 3 laps     3 laps 3 laps 3 laps      Monster walking RTB 3 laps            gastroc stretch :10x5 ea  :10x3 ea :10x5 :10x5 :10x5 :10x5 :10x5     Hamstring stretch :10x5 ea  :10x3 ea :10x5 :10x5 :10x5 :10x5 :10x5     STS foam feet 15x      15x 15x     Tandem walking        3 laps                                            Self-care/ Home management             Lymphedema education  13' total

## 2022-10-25 ENCOUNTER — OFFICE VISIT (OUTPATIENT)
Dept: OCCUPATIONAL THERAPY | Facility: CLINIC | Age: 57
End: 2022-10-25
Payer: COMMERCIAL

## 2022-10-25 DIAGNOSIS — G62.9 PERIPHERAL POLYNEUROPATHY: Primary | ICD-10-CM

## 2022-10-25 PROCEDURE — 97530 THERAPEUTIC ACTIVITIES: CPT

## 2022-10-25 PROCEDURE — 97110 THERAPEUTIC EXERCISES: CPT

## 2022-10-25 NOTE — PROGRESS NOTES
Daily Note     Today's date: 10/25/2022  Patient name: Oumar German  : 1965  MRN: [id number]  Referring provider: Samir Page DO  Dx:   Encounter Diagnosis     ICD-10-CM    1  Peripheral polyneuropathy  G62 9        Start Time: 1630  Stop Time: 1725  Total time in clinic (min): 55 minutes    Subjective: Patient reports difficulty with sleeping at night due to increase pain in BUE and LUE this date  Patient further reports a scheduled appointment  With Dr Simon Cowart on Wednesday, 10/26/22  Objective: See treatment diary below    Visit Tracker: No AUTH Req  60 visits combined PT/OT  Date   IE 9/20 9/27 10/4 10/11 10/18    10/25                                                                               Precautions: Universal; Ambulation with cane; Fall Risk       Manuals HEP 10/25/2022                    Ther Ex     B/l Wrist AROM x10 x10   B/l Wrist Flex/Ext/RD x10 x10   B/l Wrist UD/RD x10 x10   B/l Flexor Tendon Gliding   (Active Full Fist/Active Hook Fist) x10 x10   B/l Table top digit  Extension x10 x10   B/l Towel scrunches x10 x10   B/l Dice Opposition/ in hand manipulation   x25   Digit extension with rubber band x10 red X15 red         Ther Activity     B/l Abductor digits with foam blocks     B/l Boading balls (small)  x10 CW/CCW each way   B/l Sponge  x5 blue    B/l Juxtaciser  x5    B/l Clothes pin with dice  x25 red each hand    Wrist rotator  x5 CW/CCW each way   B/L Gripper  x10 level 2    B/L Wrist PREs  x10 3 lbs         Modalities     B/l Moist Heat x5 minutes x5 minutes            Assessment:   Patient tolerated session well  Session focused on HEP education, range of motion, stretching, patient education, and strengthening to improve ability to complete ADLs with ease  Patient tolerated all TE/TA with min complaints of stiffness, no pain  Patient progressing well towards goals   Patient benefiting from skilled hand therapy OT to reduce deficits to improve independence with daily activities  Plan:   Focus on AROM to improve ability to complete daily activites with ease   POC 09/13/22 - 11/22/22

## 2022-10-26 ENCOUNTER — OFFICE VISIT (OUTPATIENT)
Dept: FAMILY MEDICINE CLINIC | Facility: CLINIC | Age: 57
End: 2022-10-26
Payer: COMMERCIAL

## 2022-10-26 ENCOUNTER — OFFICE VISIT (OUTPATIENT)
Dept: PHYSICAL THERAPY | Facility: CLINIC | Age: 57
End: 2022-10-26
Payer: COMMERCIAL

## 2022-10-26 VITALS
RESPIRATION RATE: 16 BRPM | DIASTOLIC BLOOD PRESSURE: 70 MMHG | HEIGHT: 67 IN | WEIGHT: 194 LBS | SYSTOLIC BLOOD PRESSURE: 110 MMHG | TEMPERATURE: 97.5 F | BODY MASS INDEX: 30.45 KG/M2 | HEART RATE: 71 BPM | OXYGEN SATURATION: 98 %

## 2022-10-26 DIAGNOSIS — I89.0 LYMPHEDEMA OF BOTH LOWER EXTREMITIES: Primary | ICD-10-CM

## 2022-10-26 DIAGNOSIS — Z85.46 HISTORY OF PROSTATE CANCER: ICD-10-CM

## 2022-10-26 DIAGNOSIS — Z23 NEED FOR VACCINATION: ICD-10-CM

## 2022-10-26 DIAGNOSIS — M79.2 NEUROPATHIC PAIN: ICD-10-CM

## 2022-10-26 DIAGNOSIS — C61 PROSTATE CANCER (HCC): ICD-10-CM

## 2022-10-26 DIAGNOSIS — I89.0 SECONDARY LYMPHEDEMA: Primary | ICD-10-CM

## 2022-10-26 PROCEDURE — 99213 OFFICE O/P EST LOW 20 MIN: CPT | Performed by: FAMILY MEDICINE

## 2022-10-26 PROCEDURE — 90682 RIV4 VACC RECOMBINANT DNA IM: CPT

## 2022-10-26 PROCEDURE — 90471 IMMUNIZATION ADMIN: CPT

## 2022-10-26 PROCEDURE — 97140 MANUAL THERAPY 1/> REGIONS: CPT | Performed by: PHYSICAL THERAPIST

## 2022-10-26 NOTE — PROGRESS NOTES
Assessment/Plan:    1  Secondary lymphedema    2  Neuropathic pain    3  Need for vaccination  -     influenza vaccine, quadrivalent, recombinant, PF, 0 5 mL    4  Prostate cancer ([address] )  Pt is being seen in Lymphedema clinic  Pt is exercising - mvmt  Ambulates with a cane  Is at risk of falling        There are no Patient Instructions on file for this visit  Return for Next scheduled follow up  Subjective:      Patient ID: Pat Cano is a 61 y o  male  Chief Complaint   Patient presents with   • swelling     Finger ,[id number]-[id number]       Pt is here to discuss disability paperwork  Pt would like a flu shot today    Pt states he is looking for dissability for lymphadema  States when he gets out of his vehicle after driving he uses a cane or he will fall  Pt states he feels he needs more OT and PT as well as home exercise  Pt states when he wakes up in the am he is in pain in his lower extremities  States he will have pins and needles until he walks around some   Pt states in the am his hands B/L are swollen as well          The following portions of the patient's history were reviewed and updated as appropriate: allergies, current medications, past family history, past medical history, past social history, past surgical history and problem list     Review of Systems   Cardiovascular: Positive for leg swelling  Musculoskeletal: Positive for arthralgias and myalgias  Current Outpatient Medications   Medication Sig Dispense Refill   • acetaminophen (TYLENOL) 325 mg tablet Take 650 mg by mouth every 6 (six) hours as needed for mild pain     • gabapentin (Neurontin) 300 mg capsule Take 2 capsules (600 mg total) by mouth every morning AND 2 capsules (600 mg total) daily at bedtime  360 capsule 0   • tamsulosin (FLOMAX) 0 4 mg Take 0 4 mg by mouth       No current facility-administered medications for this visit         Objective:    /70   Pulse 71   Temp 97 5 °F (36 4 °C)   Resp 16   Ht 5' 7" (1 702 m)   Wt 88 kg (194 lb)   SpO2 98%   BMI 30 38 kg/m²        Physical Exam  Musculoskeletal:      Right lower leg: Edema present  Left lower leg: Edema present                  Norkaykay Safer

## 2022-10-26 NOTE — PROGRESS NOTES
PT Re-Evaluation     Today's date: 10/26/2022  Patient name: Scott Savage  : 1965  MRN: [id number]  Referring provider: Joan Cortes DO  Dx:   Encounter Diagnosis     ICD-10-CM    1  Lymphedema of both lower extremities  I89 0    2  History of prostate cancer  Z85 46        Start Time: 1545  Stop Time: 1620  Total time in clinic (min): 35 minutes    Assessment  Assessment details: Scott Savage is a 61y o  year old male who presents to IE with:   No diagnosis found  Providence Solomon is a 61 y o  male presents to IE with symptoms consistent with lymphedema including: + stemmer's sign, skin fibrosis, mild pitting, and history of insult to lymphatic system from prostate cancer and adjuvant Lupron shots  Providence Filler has made the following improvements since beginning PT: decreased pain, decreased swelling and increased tolerance to activities   Providence Filler continues to present with the impairments as listed above  Patient would continue to benefit from skilled PT to address these deficits and to maximize function  Skilled PT treatment is advised utilizing full CDT protocol to include MLD, exercise, and skin management to manage lymphedema and optimize patient's functional potential  Compression is also recommended as part of CDT protocol to maintain reduction in swelling and provide support for lymphatic system, PT recommending grade 1 compression for patient  Patient may also benefit from home compression pump in future     Impairments: abnormal gait, abnormal or restricted ROM, abnormal movement, activity intolerance, impaired balance, impaired physical strength and lacks appropriate home exercise program  Other impairment: increased swelling, lacks knowledge of lymphedema and proper skin care  Functional limitations: Walking, getting up from a chair,Barriers to therapy: Prostate cancer with cyberknife treatment and Lupron shots  Understanding of Dx/Px/POC: good   Prognosis: fair  Prognosis details: Doris Carbajal presents to IE with secondary bilateral LE lymphedema  ndGndrndanddndend:nd nd2nd Fibrosis present: None    Goals  STG  - Patient and/or caregiver will understand lymphedema precautions to decrease risk of infection and exacerbation of lymphedema  - Patient will develop a tolerance for wearing multi-layer, short stretch bandages betweens sessions to facilitate limb decongestion  - Patient will experience decrease in pitting edema which will improve tissue health and decrease risk of infection    - Patient will perform HEP independently or with minimal assistance to help improve lymphatic flow and venous return    LTG  - Patient will experience increased ROM and functional mobility to aid with ADL's such as walking, standing at time of discharge  - Patient will demonstrate more normalized gait pattern and improved balance at time of discharge  - Patient and/or caregiver will be independent with donning and doffing of compression garments which will enable regular daily garment wear at time of discharge, skin maintenance, and self- MLD   - Patient and/or caregiver will be independent with HEP and lymphedema management to prevent relapse and reduce risk of infection and hospitalizations at time of discharge    Plan  Plan details: Thank you for referring Lelear Raymundo to Physical Therapy at [place] [address] and for the opportunity to coordinate care        Patient would benefit from: lymphedema eval, PT eval and skilled physical therapy  Planned therapy interventions: manual therapy, massage, muscle pump exercises, neuromuscular re-education, orthotic fitting/training, orthotic management and training, patient education, self care, strengthening, stretching, therapeutic activities, therapeutic exercise, graded activity, dressing changes, breathing training, balance, gait training and home exercise program  Other planned therapy interventions: Complete decongestive therapy: Manual lymphatic drainage, compression bandaging, exercise, self-maintenance, and education on bandaging, skin care, and self-bandaging  Frequency: 2x week  Duration in visits: 10  Plan of Care beginning date: 10/26/2022  Treatment plan discussed with: patient        Subjective Evaluation    History of Present Illness  Mechanism of injury: RE 10/26/22     Neetu Arthur has been seen for total of 12 visits for OP PT for bilateral LE lymphedema   Patient's global rating of change is " Somewhat better (3) " Patient reports improvements with overall swelling in bilateral lower legs  Patient reports most difficulty with overall pain levels in bilateral lower legs and reports restricted mobility in bilateral ankles at times  Patient would continue to benefit from skilled PT to address remaining deficits and maximize function  Lymphedema History  Neetu Arthur presents to IE with bilateral LE lymphedema  Diagnosed in: Prostate cancer October 2021  Patient reports history of elevated PSA for a few years  Patient had biopsy which revealed cancer to prostate  Patient reports diagnosed with stage 2 cancer reporting "it was aggressive, it had gone to 2 lymph nodes " Patient had cyberknife therapy reporting 31 days of treatment over course of 7 weeks  Patient reports "tolerated very well" for surgery  Patient reports some side effects of loss of pubic hair  Patient reports he also had hormone therapy for shot of Lupron  Patient reporting episode of "passing out" when having procedure and spasming  Patient reporting neuropathy in bilateral lower legs and has tried gabapentin  Patient went through series of 3 Lupron shots       Swelling history     - Previous treatment: None    - Currently wear a compression garment: Yes, Usage: wears daily  Loss of function/ mobility:     - Dressing: no     - Lifting: no     - Walking: yes      - Reaching feet and toes: no     - Bathing/ showering: no     - Preparing meals: no     General Medical History      - Smoking: no       - Hyperthyroidism: no   Gastrointestinal:      - Abdominal surgeries: no      - Crohn's disease: no      - Diverticulitis: no   Genitourinary       - Kidney dysfunction: no      - Chronic renal insufficiency: no      - Incontinence: no      - Saddle anesthesia: no      - Bowel and bladder changes: no     Cardiovascular/ Respiratory history     - HTN: no      - Asthma: no      - Bronchitis: no      - Irregular heartbeat: no      - Sleep apnea: no      - Diabetes: no      - Heart disease: no      - Regular follow-ups with cardiologist: no      - Family history of CVD: no      - Clotting disorders: no      - Varicose veins: no      - PMH wounds: no      - PMH DVT: no   Support: Wife  Goals: "get the swelling down "   Pain  At worst pain rating: 10  Location: B/L LE  Aggravating factors: walking, standing and stair climbing    Treatments  Current treatment: physical therapy  Patient Goals  Patient goals for therapy: decreased edema, increased strength, independence with ADLs/IADLs, improved balance, increased motion and return to sport/leisure activities          Objective     Observations   Left Knee   Positive for edema  Right Knee   Positive for edema  Additional Observation Details  Palpation:  Skin Mobility: WNL  Skin temperature: WNL  Skin color: hemosiderin staining not present  Pitting: Not present  Wound presence: no  Stemmer's Sign: None  Fungal infections: no  Hyperkeratosis: no   Lymphorrhea/ weeping: no  Papillomas: no   Lymph fistulas: no   Lymph cysts: no   Cellulitis: no   Lipodermatosclerosis: no       General Comments:       Ankle/Foot Comments   Gait: patient demonstrating antalgic gait when first getting up from a chair, ambulating with SPC         LOWER EXTREMITY LEFT CALCULATIONS    Flowsheet Row Most Recent Value   Volume LE (mL) 5999   Difference from last visit (mL)  255   Difference from first visit (mL)  255   Difference from last visit (%)  4   Difference from first visit (%)  4      LOWER EXTREMITY RIGHT CALCULATIONS    Flowsheet Row Most Recent Value   Volume LE (mL) 6204   Difference from last visit (mL)  140   Difference from first visit (mL)  140   Difference from last visit (%)  2   Difference from first visit (%)  2              Precautions: Prostate cancer with cyberknife treatment and Lupron shots  HEP: review educational literature provided by PT, order short stretch bandages as highlighted by PT   Daily Treatment Diary     Manuals 10/19 10/26 8/15 8/24 8/29 8/31 9/7 9/14 9/21 10/12   B/L LE MLD sequence 25' total  35' total  30' total 30' total  30' total  30' total  25' total  25' total  25' total  30' total    Measurements  Done                         Therapeutic Exercise             Bike              Hip abduction             Hip flexion             Heel raises 2x10   2x10 2x10 2x10 2x10 2x10     Toe raises 2x10   2x10 2x10 2x10 2x10 2x10     Lateral walking RTB 3 laps     3 laps 3 laps 3 laps      Monster walking RTB 3 laps            gastroc stretch :10x5 ea  :10x3 ea :10x5 :10x5 :10x5 :10x5 :10x5     Hamstring stretch :10x5 ea  :10x3 ea :10x5 :10x5 :10x5 :10x5 :10x5     STS foam feet 15x      15x 15x     Tandem walking        3 laps                                            Self-care/ Home management             Lymphedema education  13' total

## 2022-10-26 NOTE — LETTER
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

## 2022-11-01 ENCOUNTER — APPOINTMENT (OUTPATIENT)
Dept: PHYSICAL THERAPY | Facility: CLINIC | Age: 57
End: 2022-11-01

## 2022-11-01 ENCOUNTER — OFFICE VISIT (OUTPATIENT)
Dept: OCCUPATIONAL THERAPY | Facility: CLINIC | Age: 57
End: 2022-11-01

## 2022-11-01 DIAGNOSIS — G62.9 PERIPHERAL POLYNEUROPATHY: Primary | ICD-10-CM

## 2022-11-01 NOTE — PROGRESS NOTES
Daily Note     Today's date: 2022  Patient name: Oumar German  : 1965  MRN: [id number]  Referring provider: Samir Page DO  Dx:   Encounter Diagnosis     ICD-10-CM    1  Peripheral polyneuropathy  G62 9                   Subjective: Patient offers no functional changes this date  Objective: See treatment diary below    Visit Tracker: No AUTH Req  60 visits combined PT/OT  Date   IE 9/20 9/27 10/4 10/11 10/18    10/25   11/1                                                                            Precautions: Universal; Ambulation with cane; Fall Risk       Manuals HEP 2022                    Ther Ex     B/l Wrist AROM x10 x10   B/l Wrist Flex/Ext/RD x10 x10   B/l Wrist UD/RD x10 x10   B/l Flexor Tendon Gliding   (Active Full Fist/Active Hook Fist) x10 x10   B/l Table top digit  Extension x10 x10   B/l Towel scrunches x10 x10   B/l Dice Opposition/ in hand manipulation   x25   Digit extension with rubber band x10 red X15 red         Ther Activity     B/l Abductor digits with foam blocks     B/l Boading balls (small)  x10 CW/CCW each way   B/l Sponge  x5 blue    B/l Juxtaciser  x5    B/l Clothes pin with dice  x25 red each hand    Wrist rotator  x5 CW/CCW each way   B/L Gripper  x10 level 2    B/L Wrist PREs  x10 3 lbs         Modalities     B/l Moist Heat x5 minutes x5 minutes            Assessment:   Patient tolerated session well  Session focused on HEP education, range of motion, stretching, patient education, and strengthening to improve ability to complete ADLs with ease  Patient tolerated all TE/TA with min complaints of stiffness, no pain  Patient progressing well towards goals  Patient benefiting from skilled hand therapy OT to reduce deficits to improve independence with daily activities  Plan:   Focus on AROM and strengthening to improve ability to complete daily activites with ease   POC 22 - 22

## 2022-11-02 ENCOUNTER — OFFICE VISIT (OUTPATIENT)
Dept: PHYSICAL THERAPY | Facility: CLINIC | Age: 57
End: 2022-11-02

## 2022-11-02 DIAGNOSIS — I89.0 LYMPHEDEMA OF BOTH LOWER EXTREMITIES: Primary | ICD-10-CM

## 2022-11-02 DIAGNOSIS — Z85.46 HISTORY OF PROSTATE CANCER: ICD-10-CM

## 2022-11-02 NOTE — PROGRESS NOTES
Daily Note  RE 10/26/22     Today's date: 2022  Patient name: Mari Cunningham  : 1965  MRN: [id number]  Referring provider: Radha Recinos DO  Dx:   Encounter Diagnosis     ICD-10-CM    1  Lymphedema of both lower extremities  I89 0    2  History of prostate cancer  Z85 46                   Subjective: Tristinmalissa David reports "okay" upon arrival to therapy today  Objective: See treatment diary below      Assessment: Tolerated treatment fair  Patient would benefit from continued PT  PT adding DF step mobilization today with fair tolerance, patient given updated HEP with verbalized understanding  Continue to progress patient as able next visit  Plan: Continue per plan of care  Progress treatment as tolerated               Precautions: Prostate cancer with cyberknife treatment and Lupron shots  HEP: review educational literature provided by PT, order short stretch bandages as highlighted by PT   Daily Treatment Diary     Manuals 10/19 10/26 11/2 8/24 8/29 8/31 9/7 9/14 9/21 10/12   B/L LE MLD sequence 25' total  35' total  20' total 30' total  30' total  30' total  25' total  25' total  25' total  30' total    Measurements  Done                         Therapeutic Exercise             DFstep mobilization             Hip abduction             Hip flexion             Heel raises 2x10  2x10 2x10 2x10 2x10 2x10 2x10     Toe raises 2x10  2x10 2x10 2x10 2x10 2x10 2x10     DF step mob   :05x10          Lateral walking RTB 3 laps  RTB 3 laps   3 laps 3 laps 3 laps      Monster walking RTB 3 laps  RTB 3 laps          gastroc stretch :10x5 ea  :10x5 ea :10x5 :10x5 :10x5 :10x5 :10x5     Hamstring stretch :10x5 ea  :10x5 ea :10x5 :10x5 :10x5 :10x5 :10x5     STS foam feet 15x  15x    15x 15x     Tandem walking        3 laps                                            Self-care/ Home management             Lymphedema education  13' total

## 2022-11-03 ENCOUNTER — FOLLOW UP (OUTPATIENT)
Dept: URBAN - METROPOLITAN AREA CLINIC 6 | Facility: CLINIC | Age: 57
End: 2022-11-03

## 2022-11-03 ENCOUNTER — APPOINTMENT (OUTPATIENT)
Dept: PHYSICAL THERAPY | Facility: CLINIC | Age: 57
End: 2022-11-03

## 2022-11-03 DIAGNOSIS — H40.023: ICD-10-CM

## 2022-11-03 DIAGNOSIS — H47.092: ICD-10-CM

## 2022-11-03 PROCEDURE — 92250 FUNDUS PHOTOGRAPHY W/I&R: CPT

## 2022-11-03 PROCEDURE — 92014 COMPRE OPH EXAM EST PT 1/>: CPT

## 2022-11-03 ASSESSMENT — VISUAL ACUITY
OD_SC: 20/40
OS_SC: 20/50
OU_SC: 20/30
OU_SC: J1

## 2022-11-03 ASSESSMENT — TONOMETRY
OS_IOP_MMHG: 12
OD_IOP_MMHG: 12

## 2022-11-08 ENCOUNTER — APPOINTMENT (OUTPATIENT)
Dept: PHYSICAL THERAPY | Facility: CLINIC | Age: 57
End: 2022-11-08

## 2022-11-08 ENCOUNTER — APPOINTMENT (OUTPATIENT)
Dept: OCCUPATIONAL THERAPY | Facility: CLINIC | Age: 57
End: 2022-11-08

## 2022-11-10 ENCOUNTER — OFFICE VISIT (OUTPATIENT)
Dept: PHYSICAL THERAPY | Facility: CLINIC | Age: 57
End: 2022-11-10

## 2022-11-10 DIAGNOSIS — Z85.46 HISTORY OF PROSTATE CANCER: ICD-10-CM

## 2022-11-10 DIAGNOSIS — I89.0 LYMPHEDEMA OF BOTH LOWER EXTREMITIES: Primary | ICD-10-CM

## 2022-11-10 NOTE — PROGRESS NOTES
Daily Note  RE 10/26/22     Today's date: 11/10/2022  Patient name: Jackson Stevenson  : 1965  MRN: [id number]  Referring provider: Val Brunner, DO  Dx:   Encounter Diagnosis     ICD-10-CM    1  Lymphedema of both lower extremities  I89 0    2  History of prostate cancer  Z85 46                   Subjective: James Moody reports "okay" upon arrival to therapy today  He reports he saw oncologist earlier in the week and "still cancer-free " He reports hot flashes from most recent Lupron injection  Objective: See treatment diary below      Assessment: Tolerated treatment fair  Patient would benefit from continued PT  + response to MLD noted w/ overall vol reduction assessed grossly, good tolerance to compression garments/bandages  Continue to progress patient as able next visit  Plan: Continue per plan of care  Progress treatment as tolerated               Precautions: Prostate cancer with cyberknife treatment and Lupron shots  HEP: review educational literature provided by PT, order short stretch bandages as highlighted by PT   Daily Treatment Diary     Manuals 10/19 10/26 11/2 11/10 8/29 8/31 9/7 9/14 9/21 10/12   B/L LE MLD sequence 25' total  35' total  20' total 20' total  30' total  30' total  25' total  25' total  25' total  30' total    Measurements  Done                         Therapeutic Exercise             DFstep mobilization             Hip abduction             Hip flexion             Heel raises 2x10  2x10 2x10 2x10 2x10 2x10 2x10     Toe raises 2x10  2x10 2x10 2x10 2x10 2x10 2x10     DF step mob   :05x10          Lateral walking RTB 3 laps  RTB 3 laps RTB 3 laps  3 laps 3 laps 3 laps      Monster walking RTB 3 laps  RTB 3 laps RTB 3 laps         gastroc stretch :10x5 ea  :10x5 ea :10x5 :10x5 :10x5 :10x5 :10x5     Hamstring stretch :10x5 ea  :10x5 ea :10x5 :10x5 :10x5 :10x5 :10x5     STS foam feet 15x  15x 15x   15x 15x     Tandem walking        3 laps Self-care/ Home management             Lymphedema education  13' total COPD (chronic obstructive pulmonary disease)

## 2022-11-15 ENCOUNTER — APPOINTMENT (OUTPATIENT)
Dept: PHYSICAL THERAPY | Facility: CLINIC | Age: 57
End: 2022-11-15

## 2022-11-16 DIAGNOSIS — M79.2 NEUROPATHIC PAIN: ICD-10-CM

## 2022-11-17 ENCOUNTER — OFFICE VISIT (OUTPATIENT)
Dept: PHYSICAL THERAPY | Facility: CLINIC | Age: 57
End: 2022-11-17

## 2022-11-17 DIAGNOSIS — Z85.46 HISTORY OF PROSTATE CANCER: ICD-10-CM

## 2022-11-17 DIAGNOSIS — I89.0 LYMPHEDEMA OF BOTH LOWER EXTREMITIES: Primary | ICD-10-CM

## 2022-11-17 RX ORDER — GABAPENTIN 300 MG/1
CAPSULE ORAL
Qty: 360 CAPSULE | Refills: 0 | Status: SHIPPED | OUTPATIENT
Start: 2022-11-17 | End: 2023-02-15

## 2022-11-17 NOTE — PROGRESS NOTES
Daily Note  RE 10/26/22     Today's date: 2022  Patient name: Luigi Allen  : 1965  MRN: [id number]  Referring provider: Liseth Pantoja DO  Dx:   Encounter Diagnosis     ICD-10-CM    1  Lymphedema of both lower extremities  I89 0       2  History of prostate cancer  Z85 46                      Subjective: Ashu Cross reports "okay" upon arrival to therapy today  He reports he saw oncologist earlier in the week and "still cancer-free " He reports hot flashes from most recent Lupron injection  Objective: See treatment diary below      Assessment: Tolerated treatment fair  Patient would benefit from continued PT  Patient demonstrating instability with cone taps today added by PT, PT contact guard needed at times  Continue to progress patient as able next visit  Plan: Continue per plan of care  Progress treatment as tolerated               Precautions: Prostate cancer with cyberknife treatment and Lupron shots  HEP: review educational literature provided by PT, order short stretch bandages as highlighted by PT   Daily Treatment Diary     Manuals 10/19 10/26 11/2 11/10 11/17 8/31 9/7 9/14 9/21 10/12   B/L LE MLD sequence 25' total  35' total  20' total 20' total  20' total  30' total  25' total  25' total  25' total  30' total    Measurements  Done                         Therapeutic Exercise             DFstep mobilization             Hip abduction             Hip flexion             Heel raises 2x10  2x10 2x10 2x10 2x10 2x10 2x10     Toe raises 2x10  2x10 2x10 2x10 2x10 2x10 2x10     DF step mob   :05x10          Lateral walking RTB 3 laps  RTB 3 laps RTB 3 laps RTB 3 laps 3 laps 3 laps 3 laps      Monster walking RTB 3 laps  RTB 3 laps RTB 3 laps RTB 3 laps        gastroc stretch :10x5 ea  :10x5 ea :10x5 :10x5 :10x5 :10x5 :10x5     Hamstring stretch :10x5 ea  :10x5 ea :10x5 :10x5 :10x5 :10x5 :10x5     STS foam feet 15x  15x 15x 15x  15x 15x     Tandem walking        3 laps     Cone touches    10 karen                                   Self-care/ Home management             Lymphedema education  13' total

## 2022-11-21 ENCOUNTER — TELEPHONE (OUTPATIENT)
Dept: FAMILY MEDICINE CLINIC | Facility: CLINIC | Age: 57
End: 2022-11-21

## 2022-11-21 ENCOUNTER — EVALUATION (OUTPATIENT)
Dept: OCCUPATIONAL THERAPY | Facility: CLINIC | Age: 57
End: 2022-11-21

## 2022-11-21 DIAGNOSIS — G62.9 PERIPHERAL POLYNEUROPATHY: Primary | ICD-10-CM

## 2022-11-21 NOTE — TELEPHONE ENCOUNTER
Patient aware that Dr Neha La will be in on Friday  Please advise if we can overbook    Marilyn Jimenez

## 2022-11-21 NOTE — TELEPHONE ENCOUNTER
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

## 2022-11-21 NOTE — PROGRESS NOTES
OT Re-Evaluation     Today's date: 2022  Patient name: Marina Farah  : 1965  MRN: [id number]  Referring provider: Bisi Salas DO  Dx:   Encounter Diagnosis     ICD-10-CM    1  Peripheral polyneuropathy  G62 9           Start Time: 930  Stop Time: 1015  Total time in clinic (min): 45 minutes    Assessment  Assessment details: Patient is a 61 y o  RHD male who presents for OT re-evaluation this date for bilateral peripheral neuropathy of hands with noted improvements in overall RUE and LUE AROM and strength compared to evaluation on 2022  Patient reports improvements in complete daily activities/ADLs with bilateral hands, however continues to have difficulty with increased pain at night  Patient showing continued overall deficits in strength of RUE compared to LUE at this time  Patient would benefit from continued skilled OT to maximize ability and strength in bilateral UE of RUE and LUE to complete ADLs with ease  Patient is a 61 y o  RHD male who presents for OT IE and treatment for bilateral peripheral neuropathy of hands   Patient reports bilateral numbness, generalized weakness, and increased pain at night secondary to difficulty with completing daily activities/ADLs with bilateral hands  Patient notes having difficult opening jars, mouth wash bottles, and water cap bottles, etc  Patient referred by Dr Courtney Webster  to initiate treatment including hand therapy  Impairments: abnormal or restricted ROM, activity intolerance, impaired physical strength, lacks appropriate home exercise program and pain with function    Symptom irritability: moderateUnderstanding of Dx/Px/POC: good   Prognosis: good    Goals  Short Term Goals by 2 - 4 weeks:    Establish HEP to increase performance with daily activities  MET    Patient will increase BUE functional  strength by at least 5 lbs to assist in completing ADLs   MET     Patient will increase ROM of BUE by at least 5 to 10 degrees to complete ADLs  MET    Patient will decrease pain rate of BUE by at least 2 points per the VAS scale  Partially Met    **Goal Added 22: Patient will increase BUE functional  strength by at least another 5 lbs to assist in completing ADLs  Long Term Goals by discharge:    Establish final home exercise program to enhance maximal functional level with ADLs  Partially Met    Patient will have BUE functional  strength to complete all ADLs  Partially Met    Patient will be independent in using BUE  to complete all ADLs with minimal to no complaints of pain  Partially Met         Plan  Patient would benefit from: OT eval and skilled occupational therapy  Planned modality interventions: thermotherapy: hydrocollator packs  Planned therapy interventions: therapeutic activities, strengthening, stretching, therapeutic exercise, functional ROM exercises, fine motor coordination training, graded activity, graded exercise, neuromuscular re-education, home exercise program, patient education and flexibility  Frequency: 1x week  Duration in weeks: 10  Plan of Care beginning date: 2022  Plan of Care expiration date: 2023  Treatment plan discussed with: patient        Subjective Evaluation    History of Present Illness  Mechanism of injury: Patient is a 61 y o  RHD male who presents for OT IE and treatment for bilateral peripheral neuropathy of hands  Patient reports bilateral numbness, generalized weakness, and increased pain at night secondary to difficulty with completing daily activities/ADLs with bilateral hands  Patient notes having difficult opening jars, mouth wash bottles, and water cap bottles, etc  Patient referred by Dr Courtney Webster  to initiate treatment including hand therapy          Quality of life: good    Pain  Current pain ratin  At best pain ratin  At worst pain ratin  Quality: needle-like, throbbing and burning  Relieving factors: heat and rest    Social Support  Lives with: significant other    Employment status: working (Teacher)  Hand dominance: right    Treatments  Current treatment: physical therapy and occupational therapy  Patient Goals  Patient goals for therapy: decreased pain, increased strength, independence with ADLs/IADLs, return to sport/leisure activities and increased motion          Objective     Active Range of Motion     Left Wrist   Wrist flexion: 70 degrees   Wrist extension: 64 degrees   Radial deviation: 13 degrees   Ulnar deviation: 30 degrees     Right Wrist   Wrist flexion: 74 degrees   Wrist extension: 50 degrees   Radial deviation: 15 degrees   Ulnar deviation: 28 degrees     Strength/Myotome Testing     Left Wrist/Hand      (2nd hand position)     Trial 1: 60    Thumb Strength  Key/Lateral Pinch     Trial 1: 15    Right Wrist/Hand      (2nd hand position)     Trial 1: 45    Thumb Strength   Key/Lateral Pinch     Trial 1: 21                 Subjective: Patient reports increased pain and numbness of bilateral hands at night compared to during the day and completing daily activities          Objective: See treatment diary below      Visit Tracker: No AUTH Req  60 visits combined PT/OT  Date 9/13  IE 9/20 9/27 10/4 10/11 10/18     10/25    11/1  11/21  RE                                                                                                                                  Precautions: Universal; Ambulation with cane;  Fall Risk        Manuals HEP 11/21/2022                              Ther Ex       B/l Wrist AROM x10 x5   B/l Wrist Flex/Ext/RD x10 x5   B/l Wrist UD/RD x10 x5   B/l Flexor Tendon Gliding   (Active Full Fist/Active Hook Fist) x10    B/l Table top digit  Extension x10 x10   B/l Towel scrunches x10 x5   B/l Dice Opposition/ in hand manipulation       Digit extension with rubber band x10 red x15 red            Ther Activity       B/l Abductor digits with foam blocks       B/l Boading balls (small)      B/l Sponge  x5 blue   B/l Juxtaciser   x5    B/l Clothes pin with dice   x25 red each hand    Wrist rotator   x5 CW/CCW each way   B/L Gripper   x10 level 2    B/L Wrist PREs   x10 3 lbs            Modalities       B/l Moist Heat x5 minutes x5 minutes                   Assessment:   Patient tolerated session well  Session focused on HEP education, range of motion, stretching, patient education, and strengthening to improve ability to complete ADLs with ease  Patient tolerated all TE/TA with min complaints of stiffness, no pain  Patient progressing well towards goals  Patient benefiting from skilled hand therapy OT to reduce deficits to improve independence with daily activities            Plan:   Focus on AROM to improve ability to complete daily activites with ease   POC 11/21/2022 - 01/30/2023

## 2022-11-22 ENCOUNTER — APPOINTMENT (OUTPATIENT)
Dept: OCCUPATIONAL THERAPY | Facility: CLINIC | Age: 57
End: 2022-11-22

## 2022-11-22 ENCOUNTER — APPOINTMENT (OUTPATIENT)
Dept: PHYSICAL THERAPY | Facility: CLINIC | Age: 57
End: 2022-11-22

## 2022-11-25 NOTE — TELEPHONE ENCOUNTER
Patient returned phone call and appt made for first available appt on 12/12 at 8:30am    No further action needed

## 2022-11-29 ENCOUNTER — APPOINTMENT (OUTPATIENT)
Dept: PHYSICAL THERAPY | Facility: CLINIC | Age: 57
End: 2022-11-29

## 2022-11-29 ENCOUNTER — OFFICE VISIT (OUTPATIENT)
Dept: OCCUPATIONAL THERAPY | Facility: CLINIC | Age: 57
End: 2022-11-29

## 2022-11-29 DIAGNOSIS — G62.9 PERIPHERAL POLYNEUROPATHY: Primary | ICD-10-CM

## 2022-11-29 NOTE — PROGRESS NOTES
Daily Note     Today's date: 2022  Patient name: Dale Davidson  : 1965  MRN: [id number]  Referring provider: Mary Ann Steele DO  Dx:   Encounter Diagnosis     ICD-10-CM    1  Peripheral polyneuropathy  G62 9           Start Time: 930  Stop Time: 101  Total time in clinic (min): 45 minutes    Subjective: "My hands always feel good after therapy"  Patient reports no other functional changes this date  Objective: See treatment diary below    Visit Tracker: No AUTH Req  60 visits combined PT/OT  Date   IE 9/20 9/27 10/4 10/11 10/18    10/25   11/1 11/21  RE   F                                                                          Precautions: Universal; Ambulation with cane; Fall Risk       Manuals HEP 2022                    Ther Ex     B/l Wrist AROM x10 x10   B/l Wrist Flex/Ext/RD x10 x10   B/l Wrist UD/RD x10 x10   B/l Flexor Tendon Gliding   (Active Full Fist/Active Hook Fist) x10 x10   B/l Table top digit  Extension x10 x10   B/l Towel scrunches x10 x10   B/l Dice Opposition/ in hand manipulation      Digit extension with rubber band x10 red x10 green        Ther Activity     B/l Abductor digits with foam blocks     B/l Boading balls (small)     B/l Sponge  x5 blue    B/l Juxtaciser  x10   B/l Clothes pin with dice  x25 green each hand   Wrist rotator  x10 CW/CCW each way   B/L Gripper  x10 level 3   B/L Wrist PREs  x10 3 lbs         Modalities     B/l Moist Heat x5 minutes x5 minutes            Assessment:   Patient tolerated session well  Session focused on HEP education, bilateral range of motion, stretching, patient education, and bilateral strengthening to improve ability to complete ADLs with ease  Patient tolerated increase in strengthening TE/TA with no complaints of pain or soreness  Patient progressing well towards goals  Patient benefiting from skilled hand therapy OT to reduce deficits to improve independence with daily activities         Plan:   Focus on AROM and strengthening to improve ability to complete daily activites with ease   POC 11/21/22 - 01/30/23

## 2022-12-01 ENCOUNTER — OFFICE VISIT (OUTPATIENT)
Dept: PHYSICAL THERAPY | Facility: CLINIC | Age: 57
End: 2022-12-01

## 2022-12-01 DIAGNOSIS — I89.0 LYMPHEDEMA OF BOTH LOWER EXTREMITIES: Primary | ICD-10-CM

## 2022-12-01 DIAGNOSIS — Z85.46 HISTORY OF PROSTATE CANCER: ICD-10-CM

## 2022-12-01 NOTE — PROGRESS NOTES
Daily Note  RE 10/26/22     Today's date: 2022  Patient name: Elana Stahl  : 1965  MRN: [id number]  Referring provider: Mercedez Felix DO  Dx:   Encounter Diagnosis     ICD-10-CM    1  Lymphedema of both lower extremities  I89 0       2  History of prostate cancer  Z85 46                      Subjective: Didier reports "the swelling is back from the medication" upon arrival to therapy today  Objective: See treatment diary below      Assessment: Tolerated treatment fair  Patient would benefit from continued PT  Patient demonstrating improvements with stability with cone taps today added by PT, patient reporting fatigue with TB exercises  Continue to progress patient as able next visit  Plan: Continue per plan of care  Progress treatment as tolerated               Precautions: Prostate cancer with cyberknife treatment and Lupron shots  HEP: review educational literature provided by PT, order short stretch bandages as highlighted by PT   Daily Treatment Diary     Manuals 10/19 10/26 11/2 11/10 11/17 12/1 9/7 9/14 9/21 10/12   B/L LE MLD sequence 25' total  35' total  20' total 20' total  20' total  20' total  25' total  25' total  25' total  30' total    Measurements  Done                         Therapeutic Exercise             Hip abduction             Hip flexion             Heel raises 2x10  2x10 2x10 2x10 2x10 2x10 2x10     Toe raises 2x10  2x10 2x10 2x10 2x10 2x10 2x10     DF step mob   :05x10          Lateral walking RTB 3 laps  RTB 3 laps RTB 3 laps RTB 3 laps RTB 3 laps 3 laps 3 laps      Monster walking RTB 3 laps  RTB 3 laps RTB 3 laps RTB 3 laps RTB 3 laps       gastroc stretch :10x5 ea  :10x5 ea :10x5 :10x5 :10x5 :10x5 :10x5     Hamstring stretch :10x5 ea  :10x5 ea :10x5 :10x5 :10x5 :10x5 :10x5     STS foam feet 15x  15x 15x 15x 2x10 15x 15x     Tandem walking        3 laps     Cone touches    10 ea 10 ea                                  Self-care/ Home management Lymphedema education  13' total

## 2022-12-09 ENCOUNTER — RA CDI HCC (OUTPATIENT)
Dept: OTHER | Facility: HOSPITAL | Age: 57
End: 2022-12-09

## 2022-12-09 NOTE — PROGRESS NOTES
[facility]  coding opportunities       Chart reviewed, no opportunity found: CHART REVIEWED, NO OPPORTUNITY FOUND        Patients Insurance        Commercial Insurance: [employer]

## 2022-12-12 ENCOUNTER — OFFICE VISIT (OUTPATIENT)
Dept: FAMILY MEDICINE CLINIC | Facility: CLINIC | Age: 57
End: 2022-12-12

## 2022-12-12 ENCOUNTER — TELEPHONE (OUTPATIENT)
Dept: FAMILY MEDICINE CLINIC | Facility: CLINIC | Age: 57
End: 2022-12-12

## 2022-12-12 VITALS
BODY MASS INDEX: 30.38 KG/M2 | DIASTOLIC BLOOD PRESSURE: 78 MMHG | WEIGHT: 194 LBS | HEART RATE: 86 BPM | SYSTOLIC BLOOD PRESSURE: 136 MMHG | RESPIRATION RATE: 18 BRPM | OXYGEN SATURATION: 98 % | TEMPERATURE: 97.4 F

## 2022-12-12 DIAGNOSIS — M79.2 NEUROPATHIC PAIN: ICD-10-CM

## 2022-12-12 DIAGNOSIS — C61 PROSTATE CANCER (HCC): Primary | ICD-10-CM

## 2022-12-12 DIAGNOSIS — Z02.71 DISABILITY EXAMINATION: ICD-10-CM

## 2022-12-12 DIAGNOSIS — I89.0 SECONDARY LYMPHEDEMA: ICD-10-CM

## 2022-12-12 NOTE — TELEPHONE ENCOUNTER
Patient would like a copy of visit notes from today printed and left at  to   He realizes they are on MyChart but wants printed copy  Please call patient when available    Lia Lewis

## 2022-12-12 NOTE — PROGRESS NOTES
Assessment/Plan:    1  Prostate cancer ([facility] )    2  Neuropathic pain    3  Secondary lymphedema    4  Disability examination          I filled out the form for the pt  He needs another doc to fill out    There are no Patient Instructions on file for this visit  No follow-ups on file  Subjective:      Patient ID: Milagros Farr is a 61 y o  male  Chief Complaint   Patient presents with   • Follow-up     Return to work???  Sas/cma         Pt is here for "note for work"  Pt states we have a lot to discuss    Pt states he got his lupron on Nove 8th - states he is back to his joints swelling  He is wearing compression gloves on his hands at night - states they help his hands  Right now his fingers are tight  States his ankles are swollen  Statesb he has pain inside   States his veins in his hands pop  Nad his hands swell    Pt states he tries to sleep at night, cant sleep on his rt arm  States he is constantly swelling   States his joints hurt  States the last time he was here the following day he fell  States he lost balance and he fell    States he has hot flashes - states they give him lightheadedness - states they just come  At that time he needs a seat  Pt states he has difficultly squatting to use the batroom  Hard time getting up as well    Pt states ge also has stiffness in his legs after a long distance drive    Burning in heels as well - also when he drives long distance    States when he wakes up at night he is so stiff  States he has to hold onto the bed when he walks  Pt has pics on his phone of his lymphedema that he has at night  Pt states the form the I filled out they responded, with the same form but it was revised, they wrote a letter        Pt states he was told it was sercondary to lupron shots        The following portions of the patient's history were reviewed and updated as appropriate: allergies, current medications, past family history, past medical history, past social history, past surgical history and problem list     Review of Systems   Cardiovascular: Positive for leg swelling  Musculoskeletal: Positive for arthralgias, gait problem, joint swelling and myalgias  Neurological: Positive for light-headedness and numbness  Current Outpatient Medications   Medication Sig Dispense Refill   • acetaminophen (TYLENOL) 325 mg tablet Take 650 mg by mouth every 6 (six) hours as needed for mild pain     • gabapentin (Neurontin) 300 mg capsule Take 2 capsules (600 mg total) by mouth every morning AND 2 capsules (600 mg total) daily at bedtime  360 capsule 0   • tamsulosin (FLOMAX) 0 4 mg Take 0 4 mg by mouth       No current facility-administered medications for this visit  Objective:    /78   Pulse 86   Temp (!) 97 4 °F (36 3 °C)   Resp 18   Wt 88 kg (194 lb) Comment: patient satated  sas/cma  SpO2 98%   BMI 30 38 kg/m²        Physical Exam  Cardiovascular:      Rate and Rhythm: Regular rhythm  Musculoskeletal:         General: Swelling present  Right lower leg: Edema present  Left lower leg: Edema present                  Rodney Mary DO

## 2022-12-14 ENCOUNTER — OFFICE VISIT (OUTPATIENT)
Dept: OCCUPATIONAL THERAPY | Facility: CLINIC | Age: 57
End: 2022-12-14

## 2022-12-14 DIAGNOSIS — G62.9 PERIPHERAL POLYNEUROPATHY: Primary | ICD-10-CM

## 2022-12-14 NOTE — PROGRESS NOTES
Daily Note     Today's date: 2022  Patient name: Iraida Green  : 1965  MRN: [id number]  Referring provider: Manfred Hoffman DO  Dx:   Encounter Diagnosis     ICD-10-CM    1  Peripheral polyneuropathy  G62 9           Start Time: 1100  Stop Time: 1145  Total time in clinic (min): 45 minutes    Subjective: Patient reports "I am able to open the mouthwash bottle cap easy now, but still difficult to open the lid to a jar and the pain at night in the hands is better"  Patient further reports having muscular pain in the elbow/forearm area, OTR recommended for him to contact his PCP to discuss further actions  Objective: See treatment diary below    Visit Tracker: No AUTH Req  60 visits combined PT/OT  Date   IE 9/20 9/27 10/4 10/11 10/18    10/25   11/1 11/21  RE   F                                                                          Precautions: Universal; Ambulation with cane; Fall Risk       Manuals HEP 2022                    Ther Ex     B/l Wrist AROM x10 x10   B/l Wrist Flex/Ext/RD x10 x10   B/l Wrist UD/RD x10 x10   B/l Flexor Tendon Gliding   (Active Full Fist/Active Hook Fist) x10 x10   B/l Table top digit  Extension x10 x10   B/l Towel scrunches x10 x10   B/l Dice Opposition/ in hand manipulation      Digit extension with rubber band x10 red x10 green        Ther Activity     B/l Abductor digits with foam blocks     B/l Boading balls (small)     B/l Sponge  x5 blue    B/l Juxtaciser  x10   B/l Clothes pin with dice  x25 blue each hand   Wrist rotator  x10 CW/CCW each way   B/L Gripper  x10 level 3   B/L Wrist PREs  x10 3 lbs         Modalities     B/l Moist Heat x5 minutes x5 minutes            Assessment:   Patient tolerated session well  Session focused on HEP education, bilateral range of motion, stretching, patient education, and bilateral strengthening to improve ability to complete ADLs with ease   Patient continues to tolerate of strengthening TE/TA with no complaints of pain or soreness  Patient progressing well towards goals  Patient benefiting from skilled hand therapy OT to reduce deficits to improve independence with daily activities  Plan:   Focus on AROM and strengthening to improve ability to complete daily activites with ease   POC 11/21/22 - 01/30/23

## 2022-12-15 ENCOUNTER — OFFICE VISIT (OUTPATIENT)
Dept: PHYSICAL THERAPY | Facility: CLINIC | Age: 57
End: 2022-12-15

## 2022-12-15 DIAGNOSIS — I89.0 LYMPHEDEMA OF BOTH LOWER EXTREMITIES: Primary | ICD-10-CM

## 2022-12-15 DIAGNOSIS — Z85.46 HISTORY OF PROSTATE CANCER: ICD-10-CM

## 2022-12-15 NOTE — PROGRESS NOTES
PT Re-Evaluation  and PT Discharge    Today's date: 12/15/2022  Patient name: Josue Bates  : 1965  MRN: [id number]  Referring provider: Jerry Anderson DO  Dx:   Encounter Diagnosis     ICD-10-CM    1  Lymphedema of both lower extremities  I89 0       2  History of prostate cancer  Z85 46                      Assessment  Assessment details: Josue Bates is a 61y o  year old male who presents to IE with:   No diagnosis found  Bao Gibbons is a 61 y o  male presents to IE with symptoms consistent with lymphedema including: + stemmer's sign, skin fibrosis, mild pitting, and history of insult to lymphatic system from prostate cancer and adjuvant Lupron shots  Bao Gibbons has made the following improvements since beginning PT: decreased pain, decreased swelling and increased tolerance to activities   Patient demonstrating 5% reduction in bilateral LE volume since beginning PT  Patient to be discharged at this time due to progress made and primary PT going out on maternity leave  Patient encouraged to contact PT office while PT out on maternity leave and will be directed to appropriate office as needed if patient needs therapy or has any questions      Impairments: abnormal gait, abnormal or restricted ROM, abnormal movement, activity intolerance, impaired balance, impaired physical strength and lacks appropriate home exercise program  Other impairment: increased swelling, lacks knowledge of lymphedema and proper skin care  Functional limitations: Walking, getting up from a chair,Barriers to therapy: Prostate cancer with cyberknife treatment and Lupron shots  Understanding of Dx/Px/POC: good   Prognosis: fair  Prognosis details: Bao Gibbons presents to IE with secondary bilateral LE lymphedema  ndGndrndanddndend:nd nd2nd Fibrosis present: None    Goals  STG  - Patient and/or caregiver will understand lymphedema precautions to decrease risk of infection and exacerbation of lymphedema -- Met  - Patient will develop a tolerance for wearing multi-layer, short stretch bandages betweens sessions to facilitate limb decongestion -- Met  - Patient will experience decrease in pitting edema which will improve tissue health and decrease risk of infection  -- Met  - Patient will perform HEP independently or with minimal assistance to help improve lymphatic flow and venous return -- Met    LTG  - Patient will experience increased ROM and functional mobility to aid with ADL's such as walking, standing at time of discharge -- Met  - Patient will demonstrate more normalized gait pattern and improved balance at time of discharge  -- Met  - Patient and/or caregiver will be independent with donning and doffing of compression garments which will enable regular daily garment wear at time of discharge, skin maintenance, and self- MLD -- Met  - Patient and/or caregiver will be independent with HEP and lymphedema management to prevent relapse and reduce risk of infection and hospitalizations at time of discharge -- Met    Plan  Plan details: Thank you for referring Malik Long to Physical Therapy at [facility] and for the opportunity to coordinate care        Patient would benefit from: skilled physical therapy  Planned therapy interventions: manual therapy, massage, muscle pump exercises, neuromuscular re-education, orthotic fitting/training, orthotic management and training, patient education, self care, strengthening, stretching, therapeutic activities, therapeutic exercise, graded activity, dressing changes, breathing training, balance, gait training and home exercise program  Other planned therapy interventions: Complete decongestive therapy: Manual lymphatic drainage, compression bandaging, exercise, self-maintenance, and education on bandaging, skin care, and self-bandaging  Frequency: 2x week  Duration in visits: 1  Plan of Care beginning date: 12/15/2022  Treatment plan discussed with: patient        Subjective Evaluation    History of Present Illness  Mechanism of injury: Discharge 12/15/22     Scott Logan has been seen for total of 17 visits for OP PT for bilateral LE lymphedema   Didier rates Global Rating of Change as Quite a bit better (5) since beginning OP PT   Patient reports improvements with overall swelling in bilateral lower legs  Patient continues to reports most difficulty with overall pain levels in bilateral lower legs and reports restricted mobility in bilateral ankles at times  Patient demonstrating 5% reduction in bilateral LE volume since beginning PT  Patient to be discharged at this time due to progress made and primary PT going out on maternity leave  Patient encouraged to contact PT office while PT out on maternity leave and will be directed to appropriate office as needed if patient needs therapy or has any questions  Lymphedema History  Scott Logan presents to IE with bilateral LE lymphedema  Diagnosed in: Prostate cancer October 2021  Patient reports history of elevated PSA for a few years  Patient had biopsy which revealed cancer to prostate  Patient reports diagnosed with stage 2 cancer reporting "it was aggressive, it had gone to 2 lymph nodes " Patient had cyberknife therapy reporting 31 days of treatment over course of 7 weeks  Patient reports "tolerated very well" for surgery  Patient reports some side effects of loss of pubic hair  Patient reports he also had hormone therapy for shot of Lupron  Patient reporting episode of "passing out" when having procedure and spasming  Patient reporting neuropathy in bilateral lower legs and has tried gabapentin  Patient went through series of 3 Lupron shots       Swelling history     - Previous treatment: None    - Currently wear a compression garment: Yes, Usage: wears daily  Loss of function/ mobility:     - Dressing: no     - Lifting: no     - Walking: yes      - Reaching feet and toes: no     - Bathing/ showering: no     - Preparing meals: no     General Medical History      - Smoking: no       - Hyperthyroidism: no   Gastrointestinal:      - Abdominal surgeries: no      - Crohn's disease: no      - Diverticulitis: no   Genitourinary       - Kidney dysfunction: no      - Chronic renal insufficiency: no      - Incontinence: no      - Saddle anesthesia: no      - Bowel and bladder changes: no     Cardiovascular/ Respiratory history     - HTN: no      - Asthma: no      - Bronchitis: no      - Irregular heartbeat: no      - Sleep apnea: no      - Diabetes: no      - Heart disease: no      - Regular follow-ups with cardiologist: no      - Family history of CVD: no      - Clotting disorders: no      - Varicose veins: no      - PMH wounds: no      - PMH DVT: no   Support: Wife  Goals: "get the swelling down "   Pain  At worst pain rating: 10  Location: B/L LE  Aggravating factors: walking, standing and stair climbing    Treatments  Current treatment: physical therapy  Patient Goals  Patient goals for therapy: decreased edema, increased strength, independence with ADLs/IADLs, improved balance, increased motion and return to sport/leisure activities          Objective     Observations   Left Knee   Positive for edema  Right Knee   Positive for edema  Additional Observation Details  Palpation:  Skin Mobility: WNL  Skin temperature: WNL  Skin color: hemosiderin staining not present  Pitting: Not present  Wound presence: no  Stemmer's Sign: None  Fungal infections: no  Hyperkeratosis: no   Lymphorrhea/ weeping: no  Papillomas: no   Lymph fistulas: no   Lymph cysts: no   Cellulitis: no   Lipodermatosclerosis: no       General Comments:       Ankle/Foot Comments   Gait: patient demonstrating antalgic gait when first getting up from a chair, ambulating with SPC    LOWER EXTREMITY LEFT CALCULATIONS    Flowsheet Row Most Recent Value   Volume LE (mL) 5907   Difference from last visit (mL)  92   Difference from first visit (mL)  347   Difference from last visit (%)  2   Difference from first visit (%)  6      LOWER EXTREMITY RIGHT CALCULATIONS    Flowsheet Row Most Recent Value   Volume LE (mL) 6022   Difference from last visit (mL)  182   Difference from first visit (mL)  322   Difference from last visit (%)  3   Difference from first visit (%)  5                Precautions: Prostate cancer with cyberknife treatment and Lupron shots  HEP: review educational literature provided by PT, order short stretch bandages as highlighted by PT   Daily Treatment Diary     Manuals 10/19 10/26 11/2 11/10 11/17 12/1 12/15 9/14 9/21 10/12   B/L LE MLD sequence 25' total  35' total  20' total 20' total  20' total  20' total  30' total  25' total  25' total  30' total    Measurements  Done      Done                    Therapeutic Exercise             Hip abduction             Hip flexion             Heel raises 2x10  2x10 2x10 2x10 2x10  2x10     Toe raises 2x10  2x10 2x10 2x10 2x10  2x10     DF step mob   :05x10          Lateral walking RTB 3 laps  RTB 3 laps RTB 3 laps RTB 3 laps RTB 3 laps  3 laps      Monster walking RTB 3 laps  RTB 3 laps RTB 3 laps RTB 3 laps RTB 3 laps       gastroc stretch :10x5 ea  :10x5 ea :10x5 :10x5 :10x5  :10x5     Hamstring stretch :10x5 ea  :10x5 ea :10x5 :10x5 :10x5  :10x5     STS foam feet 15x  15x 15x 15x 2x10  15x     Tandem walking        3 laps     Cone touches    10 ea 10 ea                                  Self-care/ Home management             Lymphedema education  13' total

## 2022-12-30 ENCOUNTER — OFFICE VISIT (OUTPATIENT)
Dept: OCCUPATIONAL THERAPY | Facility: CLINIC | Age: 57
End: 2022-12-30

## 2022-12-30 DIAGNOSIS — G62.9 PERIPHERAL POLYNEUROPATHY: Primary | ICD-10-CM

## 2022-12-30 NOTE — PROGRESS NOTES
OT Daily Note/OT Discharge    Today's date: 2022  Patient name: Diana Watts  : 1965  MRN: [id number]  Referring provider: Amparo Lerner DO  Dx:   Encounter Diagnosis     ICD-10-CM    1  Peripheral polyneuropathy  G62 9           Start Time: 1100  Stop Time: 1145  Total time in clinic (min): 45 minutes    Subjective: Patient reports ""the results are functional, able to do things, hands are more functional, I gained something with my hands"  Objective: See treatment diary below    Before Stretches and TE/TA:    RUE:  Wrist Flex: 28  Wrist Ext: 60  RD: 15  UD:5    : 45  Lateral Pinch: 20    LUE:  Wrist Flex: 54  Wrist Ext: 65  RD: 15  UD: 8    : 48  Lateral Pinch: 24      After Stretches and TE/TA:    RUE:   Wrist Flex: 26  Wrist Ext: 61  RD: 11  UD: 7    : 47  Lateral Pinch:21      LUE:  Wrist Flex: 35  Wrist Ext: 64  RD: 10  UD: 20    : 58  Lateral Pinch:22        Visit Tracker: No AUTH Req  60 visits combined PT/OT  Date   IE 9/20 9/27 10/4 10/11 10/18    10/25   11/1 11/21  RE   F   D/C                                                                          Precautions: Universal; Ambulation with cane;  Fall Risk       Manuals HEP 2022                    Ther Ex     B/l Wrist AROM x10 x10   B/l Wrist Flex/Ext/RD x10 x10   B/l Wrist UD/RD x10 x10   B/l Flexor Tendon Gliding   (Active Full Fist/Active Hook Fist) x10 x10   B/l Table top digit  Extension x10 x10   B/l Towel scrunches x10 x10   B/l Dice Opposition/ in hand manipulation      Digit extension with rubber band x10 red x10 green        Ther Activity     B/l Abductor digits with foam blocks     B/l Boading balls (small)     B/l Sponge  x5 blue    B/l Juxtaciser  x10   B/l Clothes pin with dice  x25 blue each hand   Wrist rotator  x10 CW/CCW each way   B/L Gripper  x10 level 3   B/L Wrist PREs  x10 3 lbs         Modalities     B/l Moist Heat x5 minutes x5 minutes            Assessment: Patient tolerated session well  Session focused on HEP education, bilateral range of motion, stretching, patient education, and bilateral strengthening to improve ability to complete ADLs with ease  Patient continues to tolerate of strengthening TE/TA with no complaints of pain or soreness  Educated patient on theraputty HEP with verbal and written instructions  Patient has met all prescribed OT therapy goals and will be D/C to HEP at this time  Goals  Short Term Goals by 2 - 4 weeks:    Establish HEP to increase performance with daily activities  MET    Patient will increase BUE functional  strength by at least 5 lbs to assist in completing ADLs  MET     Patient will increase ROM of BUE by at least 5 to 10 degrees to complete ADLs  MET    Patient will decrease pain rate of BUE by at least 2 points per the VAS scale  MET    **Goal Added 11/21/22: Patient will increase BUE functional  strength by at least another 5 lbs to assist in completing ADLs  MET      Long Term Goals by discharge:    Establish final home exercise program to enhance maximal functional level with ADLs  MET    Patient will have BUE functional  strength to complete all ADLs  MET    Patient will be independent in using BUE  to complete all ADLs with minimal to no complaints of pain  MET          Plan:   D/C to HEP  Provided blue and green resisitve sponges, green resistive rubber band and theraputty for HEP

## 2023-01-21 DIAGNOSIS — M79.2 NEUROPATHIC PAIN: ICD-10-CM

## 2023-01-23 RX ORDER — GABAPENTIN 300 MG/1
CAPSULE ORAL
Qty: 360 CAPSULE | Refills: 0 | Status: SHIPPED | OUTPATIENT
Start: 2023-01-23 | End: 2023-04-23

## 2023-02-24 NOTE — PROGRESS NOTES
Daily Note     Today's date: 2020  Patient name: Hannah Turcios  : 1965  MRN: [id number]  Referring provider: Ester Hector MD  Dx:   Encounter Diagnosis     ICD-10-CM    1  S/P left rotator cuff repair [id number]                   Subjective: He notes that he felt better after he left last session, but with the cold today he has soreness in the same muscles today  Objective: See treatment diary below      Assessment: Tolerated treatment well  Patient would benefit from continued PT  He continues to respond well to [facility] to biceps/lat and scapular mobility with improved shoulder L flexion PROM afterwards  He was encouraged to increase compliance with HEP to maintain shoulder flexion  He tolerated PNF D1 and D2 flexion/extension well this date with mild difficulty  He tolerated initiation of tricep extensions, but fatigued quickly  He was able to perform Bicep curls without difficulty  He demonstrated improved technique with push-up plus with increased repetitions  He continues to be fatigued at the end of the session  Plan: Continue per plan of care  Re-Evaluation next visit        Diagnosis: S/p L RTC repair (DOS 10/14/19)   Precautions: Protocol Attached     Manual Therapy 19   PROM  10' RS  10' RS 10' RS 8' RS 8' RS   Scapular Mobility    +lat stretch with scap mobilizations in supine   4' RS     MWM sidelying abduction/flexion         STM biceps/deltoid     2' RS                               Re-Evaluation         Exercise Diary  19   Pulleys 3'/3' 3'/3' 3'/3' 3'/3' 3'/3' 3'/3'   Prone scapular retraction 5"x20  5"x15      Prone row 20x  4# 20x      Prone T 15x  10x      Bicep Curls: supinated/netural         Sleeper stretch  10x10" 10x10"      IR towel Stretch with strap   10x10"      Sidelying AROM ER 20x ER 20x 2#  Flx 20x  Abd 20x  ER 2# 2x10     Table ER stretch   10x10"      Posterior The right coronary artery was selectively engaged and injected. Multiple views of the injected vessel were taken. Capsule Stretch  10x10"       Seated T/S 1/2 FR shoulder flexion with cane   10x      Serratus punches    4# 5"x20     Bicep curls      5# 20xea alternating            TB ER/IR Walkout  Red 10x  Active TB Ir/ER  Red 2x10 Grn 20xea    TB Rows  Grn 20x   kesier 15# 20x    Push-up Plus    10x10" + only  10x10" + only   Wall Slide   15x 15x     Scapt[place], lateral raises, front raises     10xea 10xea   Cane extension     Table shoulder extension 10x10"    Towel IR stretch         PNF D1, D2 flx/ext      Yellow 10xea                                                Modalities 12/19/19 12/23/19 12/27/19 12/30/19 1/7/20 1/9/20   CP 10'  10'  10'  10' 10'

## 2023-05-22 DIAGNOSIS — M79.2 NEUROPATHIC PAIN: ICD-10-CM

## 2023-05-23 RX ORDER — GABAPENTIN 300 MG/1
CAPSULE ORAL
Qty: 360 CAPSULE | Refills: 0 | Status: SHIPPED | OUTPATIENT
Start: 2023-05-23 | End: 2023-08-21

## 2023-08-17 ENCOUNTER — OFFICE VISIT (OUTPATIENT)
Dept: FAMILY MEDICINE CLINIC | Facility: CLINIC | Age: 58
End: 2023-08-17
Payer: COMMERCIAL

## 2023-08-17 VITALS
WEIGHT: 196.6 LBS | TEMPERATURE: 96.2 F | BODY MASS INDEX: 30.86 KG/M2 | SYSTOLIC BLOOD PRESSURE: 102 MMHG | OXYGEN SATURATION: 98 % | HEART RATE: 70 BPM | DIASTOLIC BLOOD PRESSURE: 70 MMHG | RESPIRATION RATE: 18 BRPM | HEIGHT: 67 IN

## 2023-08-17 DIAGNOSIS — I89.0 SECONDARY LYMPHEDEMA: ICD-10-CM

## 2023-08-17 DIAGNOSIS — E66.9 OBESITY (BMI 30.0-34.9): ICD-10-CM

## 2023-08-17 DIAGNOSIS — M79.2 NEUROPATHIC PAIN: Primary | ICD-10-CM

## 2023-08-17 DIAGNOSIS — E78.00 ELEVATED LOW-DENSITY LIPOPROTEIN LEVEL: ICD-10-CM

## 2023-08-17 DIAGNOSIS — C61 PROSTATE CANCER (HCC): ICD-10-CM

## 2023-08-17 DIAGNOSIS — Z13.6 SCREENING FOR CARDIOVASCULAR CONDITION: ICD-10-CM

## 2023-08-17 DIAGNOSIS — B07.9 VIRAL WARTS, UNSPECIFIED TYPE: ICD-10-CM

## 2023-08-17 PROCEDURE — 99214 OFFICE O/P EST MOD 30 MIN: CPT | Performed by: FAMILY MEDICINE

## 2023-08-17 RX ORDER — GABAPENTIN 300 MG/1
600 CAPSULE ORAL 3 TIMES DAILY
Qty: 540 CAPSULE | Refills: 0 | Status: SHIPPED | OUTPATIENT
Start: 2023-08-17 | End: 2023-11-15

## 2023-08-17 RX ORDER — SILDENAFIL 100 MG/1
100 TABLET, FILM COATED ORAL DAILY PRN
COMMUNITY
Start: 2023-06-21

## 2023-08-17 NOTE — PATIENT INSTRUCTIONS
Obesity   AMBULATORY CARE:   Obesity  means your body mass index (BMI) is greater than 30. Your healthcare provider will use your age, height, and weight to measure your BMI. The risks of obesity include  many health problems, including injuries or physical disability. • Diabetes (high blood sugar level)    • High blood pressure or high cholesterol    • Heart disease    • Stroke    • Gallbladder or liver disease    • Cancer of the colon, breast, prostate, liver, or kidney    • Sleep apnea    • Arthritis or gout    Screening  is done to check for health conditions before you have signs or symptoms. If you are 28to 79years old, your blood sugar level may be checked every 3 years for signs of prediabetes or diabetes. Your healthcare provider will check your blood pressure at each visit. High blood pressure can lead to a stroke or other problems. Your provider may check for signs of heart disease, cancer, or other health problems. Seek care immediately if:   • You have a severe headache, confusion, or difficulty speaking. • You have weakness on one side of your body. • You have chest pain, sweating, or shortness of breath. Call your doctor if:   • You have symptoms of gallbladder or liver disease, such as pain in your upper abdomen. • You have knee or hip pain and discomfort while walking. • You have symptoms of diabetes, such as intense hunger and thirst, and frequent urination. • You have symptoms of sleep apnea, such as snoring or daytime sleepiness. • You have questions or concerns about your condition or care. Treatment for obesity  focuses on helping you lose weight to improve your health. Even a small decrease in BMI can reduce the risk for many health problems. Your healthcare provider will help you set a weight-loss goal.  • Lifestyle changes  are the first step in treating obesity. These include making healthy food choices and getting regular physical activity.  Your healthcare provider may suggest a weight-loss program that involves coaching, education, and therapy. • Medicine  may help you lose weight when it is used with a healthy foods and physical activity. • Surgery  can help you lose weight if you have obesity along with other health problems. Several types of weight-loss surgery are available. Ask your healthcare provider for more information. Tips for safe weight loss:   • Set small, realistic goals. An example of a small goal is to walk for 20 minutes 5 days a week. Anther goal is to lose 5% of your body weight. • Ask for support. Tell friends, family members, and coworkers about your goals. Ask someone to lose weight with you. You may also want to join a weight-loss support group. • Identify foods or triggers that may cause you to overeat. Remove tempting high-calorie foods from your home and workplace. Place a bowl of fresh fruit on your kitchen counter. If stress causes you to eat, find other ways to cope with stress. A counselor or therapist may be able to help you. • Track your daily calories and activity. Write down what you eat and drink. Also write down how many minutes of physical activity you do each day. • Track your weekly weight. Weigh yourself in the morning, before you eat or drink anything but after you use the bathroom. Use the same scale, in the same place, and in similar clothing each time. Only weigh yourself 1 to 2 times each week, or as directed. You may become discouraged if you weigh yourself every day. Eating changes: You will need to eat 500 to 1,000 fewer calories each day than you currently eat to lose 1 to 2 pounds a week. The following changes will help you cut calories:  • Eat smaller portions. Use small plates, no larger than 9 inches in diameter. Fill your plate half full of fruits and vegetables. Measure your food using measuring cups until you know what a serving size looks like.          • Eat 3 meals and 1 or 2 snacks each day. Plan your meals in advance. [place] Pap and eat at home most of the time. Eat slowly. Do not skip meals. Skipping meals can lead to overeating later in the day. This can make it harder for you to lose weight. Talk with a dietitian to help you make a meal plan and schedule that is right for you. • Eat fruits and vegetables at every meal.  They are low in calories and high in fiber, which makes you feel full. Do not add butter, margarine, or cream sauce to vegetables. Use herbs to season steamed vegetables. • Eat less fat and fewer fried foods. Eat more baked or grilled chicken and fish. These protein sources are lower in calories and fat than red meat. Limit fast food. Dress your salads with olive oil and vinegar instead of bottled dressing. • Limit the amount of sugar you eat. Do not drink sugary beverages. Limit alcohol. Activity changes:  Physical activity is good for your body in many ways. It helps you burn calories and build strong muscles. It decreases stress and depression, and improves your mood. It can also help you sleep better. Talk to your healthcare provider before you begin an exercise program.  • Exercise for at least 30 minutes 5 days a week. Start slowly. Set aside time each day for physical activity that you enjoy and that is convenient for you. It is best to do both weight training and an activity that increases your heart rate, such as walking, bicycling, or swimming. • Find ways to be more active. Do yard work and housecleaning. Walk up the stairs instead of using elevators. Spend your leisure time going to events that require walking, such as outdoor festivals or fairs. This extra physical activity can help you lose weight and keep it off. Follow up with your doctor as directed: You may need to meet with a dietitian. Write down your questions so you remember to ask them during your visits.   © Copyright [employer] 2022 Information is for End User's use only and may not be sold, redistributed or otherwise used for commercial purposes. The above information is an  only. It is not intended as medical advice for individual conditions or treatments. Talk to your doctor, nurse or pharmacist before following any medical regimen to see if it is safe and effective for you.

## 2023-08-17 NOTE — PROGRESS NOTES
Assessment/Plan:    1. Neuropathic pain  -     gabapentin (Neurontin) 300 mg capsule; Take 2 capsules (600 mg total) by mouth 3 (three) times a day  -     CBC; Future    2. Obesity (BMI 30.0-34.9)  -     CBC; Future    3. BMI 30.0-30.9,adult  -     CBC; Future    4. Secondary lymphedema  -     CBC; Future    5. Prostate cancer ([address])  -     CBC; Future    6. Elevated low-density lipoprotein level  -     CBC; Future    7. Screening for cardiovascular condition  -     Comprehensive metabolic panel; Future  -     Lipid Panel with Direct LDL reflex; Future  -     CBC; Future    8. Viral warts, unspecified type  -     Ambulatory referral to Dermatology; Future            Patient Instructions       Obesity   AMBULATORY CARE:   Obesity  means your body mass index (BMI) is greater than 30. Your healthcare provider will use your age, height, and weight to measure your BMI. The risks of obesity include  many health problems, including injuries or physical disability. • Diabetes (high blood sugar level)    • High blood pressure or high cholesterol    • Heart disease    • Stroke    • Gallbladder or liver disease    • Cancer of the colon, breast, prostate, liver, or kidney    • Sleep apnea    • Arthritis or gout    Screening  is done to check for health conditions before you have signs or symptoms. If you are 28to 79years old, your blood sugar level may be checked every 3 years for signs of prediabetes or diabetes. Your healthcare provider will check your blood pressure at each visit. High blood pressure can lead to a stroke or other problems. Your provider may check for signs of heart disease, cancer, or other health problems. Seek care immediately if:   • You have a severe headache, confusion, or difficulty speaking. • You have weakness on one side of your body. • You have chest pain, sweating, or shortness of breath.     Call your doctor if:   • You have symptoms of gallbladder or liver disease, such as pain in your upper abdomen. • You have knee or hip pain and discomfort while walking. • You have symptoms of diabetes, such as intense hunger and thirst, and frequent urination. • You have symptoms of sleep apnea, such as snoring or daytime sleepiness. • You have questions or concerns about your condition or care. Treatment for obesity  focuses on helping you lose weight to improve your health. Even a small decrease in BMI can reduce the risk for many health problems. Your healthcare provider will help you set a weight-loss goal.  • Lifestyle changes  are the first step in treating obesity. These include making healthy food choices and getting regular physical activity. Your healthcare provider may suggest a weight-loss program that involves coaching, education, and therapy. • Medicine  may help you lose weight when it is used with a healthy foods and physical activity. • Surgery  can help you lose weight if you have obesity along with other health problems. Several types of weight-loss surgery are available. Ask your healthcare provider for more information. Tips for safe weight loss:   • Set small, realistic goals. An example of a small goal is to walk for 20 minutes 5 days a week. Anther goal is to lose 5% of your body weight. • Ask for support. Tell friends, family members, and coworkers about your goals. Ask someone to lose weight with you. You may also want to join a weight-loss support group. • Identify foods or triggers that may cause you to overeat. Remove tempting high-calorie foods from your home and workplace. Place a bowl of fresh fruit on your kitchen counter. If stress causes you to eat, find other ways to cope with stress. A counselor or therapist may be able to help you. • Track your daily calories and activity. Write down what you eat and drink. Also write down how many minutes of physical activity you do each day. • Track your weekly weight.   Weigh yourself in the morning, before you eat or drink anything but after you use the bathroom. Use the same scale, in the same place, and in similar clothing each time. Only weigh yourself 1 to 2 times each week, or as directed. You may become discouraged if you weigh yourself every day. Eating changes: You will need to eat 500 to 1,000 fewer calories each day than you currently eat to lose 1 to 2 pounds a week. The following changes will help you cut calories:  • Eat smaller portions. Use small plates, no larger than 9 inches in diameter. Fill your plate half full of fruits and vegetables. Measure your food using measuring cups until you know what a serving size looks like. • Eat 3 meals and 1 or 2 snacks each day. Plan your meals in advance. Suella Shape and eat at home most of the time. Eat slowly. Do not skip meals. Skipping meals can lead to overeating later in the day. This can make it harder for you to lose weight. Talk with a dietitian to help you make a meal plan and schedule that is right for you. • Eat fruits and vegetables at every meal.  They are low in calories and high in fiber, which makes you feel full. Do not add butter, margarine, or cream sauce to vegetables. Use herbs to season steamed vegetables. • Eat less fat and fewer fried foods. Eat more baked or grilled chicken and fish. These protein sources are lower in calories and fat than red meat. Limit fast food. Dress your salads with olive oil and vinegar instead of bottled dressing. • Limit the amount of sugar you eat. Do not drink sugary beverages. Limit alcohol. Activity changes:  Physical activity is good for your body in many ways. It helps you burn calories and build strong muscles. It decreases stress and depression, and improves your mood. It can also help you sleep better. Talk to your healthcare provider before you begin an exercise program.  • Exercise for at least 30 minutes 5 days a week. Start slowly.  Set aside time each day for physical activity that you enjoy and that is convenient for you. It is best to do both weight training and an activity that increases your heart rate, such as walking, bicycling, or swimming. • Find ways to be more active. Do yard work and housecleaning. Walk up the stairs instead of using elevators. Spend your leisure time going to events that require walking, such as outdoor festivals or fairs. This extra physical activity can help you lose weight and keep it off. Follow up with your doctor as directed: You may need to meet with a dietitian. Write down your questions so you remember to ask them during your visits. © Copyright [employer] 2022 Information is for End User's use only and may not be sold, redistributed or otherwise used for commercial purposes. The above information is an  only. It is not intended as medical advice for individual conditions or treatments. Talk to your doctor, nurse or pharmacist before following any medical regimen to see if it is safe and effective for you. Return in about 4 weeks (around 9/14/2023) for Annual physical.    Subjective:      Patient ID: Helen Meza is a 61 y.o. male. Chief Complaint   Patient presents with   • Follow-up     Simone Dillon CMA        Pt states he is here for a med change. Pt states he is still dealing with his pain  Pt states he has pain that goes through his legs and his hands. States at night he has spasms in his legs  Pt wears compression garments  His fingers swell and he states they become numb - hands hurt    Pt states his left knee has become bothersome he wears support on the left knee as well    Pt does exercise - walks through his neighborhood. Does get hot flashes  All day long - when they kick in he gets lightheaded.       Pt states he was seen by his oncologist his PSA is 0.06    Pt states he does watch for falling    Pt states his testosterone moved from 16 to 38    Wakes up tired has to urinate three times a night    Hard to squat on the bowl, any squatting is hard        The following portions of the patient's history were reviewed and updated as appropriate: allergies, current medications, past family history, past medical history, past social history, past surgical history and problem list.    Review of Systems   Musculoskeletal: Positive for arthralgias, gait problem and myalgias. Current Outpatient Medications   Medication Sig Dispense Refill   • acetaminophen (TYLENOL) 325 mg tablet Take 650 mg by mouth every 6 (six) hours as needed for mild pain     • gabapentin (Neurontin) 300 mg capsule Take 2 capsules (600 mg total) by mouth 3 (three) times a day 540 capsule 0   • sildenafil (VIAGRA) 100 mg tablet Take 100 mg by mouth Once daily as needed     • tamsulosin (FLOMAX) 0.4 mg Take 0.4 mg by mouth       No current facility-administered medications for this visit. Objective:    /70   Pulse 70   Temp (!) 96.2 °F (35.7 °C)   Resp 18   Ht 5' 7" (1.702 m)   Wt 89.2 kg (196 lb 9.6 oz)   SpO2 98%   BMI 30.79 kg/m²        Physical Exam  Vitals and nursing note reviewed. Constitutional:       General: He is not in acute distress. Appearance: He is well-developed. He is not diaphoretic. HENT:      Head: Normocephalic and atraumatic. Right Ear: External ear normal.      Left Ear: External ear normal.      Nose: Nose normal.      Mouth/Throat:      Pharynx: No oropharyngeal exudate. Eyes:      General: No scleral icterus. Right eye: No discharge. Left eye: No discharge. Pupils: Pupils are equal, round, and reactive to light. Neck:      Thyroid: No thyromegaly. Cardiovascular:      Rate and Rhythm: Normal rate. Heart sounds: Normal heart sounds. No murmur heard. Pulmonary:      Effort: Pulmonary effort is normal. No respiratory distress. Breath sounds: Normal breath sounds. No wheezing.    Abdominal:      General: Bowel sounds are normal. There is no distension. Palpations: Abdomen is soft. There is no mass. Tenderness: There is no abdominal tenderness. There is no guarding or rebound. Musculoskeletal:         General: Normal range of motion. Skin:     General: Skin is warm and dry. Findings: No erythema or rash. Neurological:      Mental Status: He is alert. Coordination: Coordination normal.      Deep Tendon Reflexes: Reflexes normal.   Psychiatric:         Behavior: Behavior normal.                Timo Dodd,   BMI Counseling: Body mass index is 30.79 kg/m². The BMI is above normal. Nutrition recommendations include reducing portion sizes.

## 2023-08-20 LAB
ALBUMIN SERPL-MCNC: 4.6 G/DL (ref 3.6–5.1)
ALBUMIN/GLOB SERPL: 1.9 (CALC) (ref 1–2.5)
ALP SERPL-CCNC: 56 U/L (ref 35–144)
ALT SERPL-CCNC: 12 U/L (ref 9–46)
AST SERPL-CCNC: 17 U/L (ref 10–35)
BILIRUB SERPL-MCNC: 1.1 MG/DL (ref 0.2–1.2)
BUN SERPL-MCNC: 13 MG/DL (ref 7–25)
BUN/CREAT SERPL: NORMAL (CALC) (ref 6–22)
CALCIUM SERPL-MCNC: 9.8 MG/DL (ref 8.6–10.3)
CHLORIDE SERPL-SCNC: 104 MMOL/L (ref 98–110)
CHOLEST SERPL-MCNC: 247 MG/DL
CHOLEST/HDLC SERPL: 4.8 (CALC)
CO2 SERPL-SCNC: 29 MMOL/L (ref 20–32)
CREAT SERPL-MCNC: 1.07 MG/DL (ref 0.7–1.3)
ERYTHROCYTE [DISTWIDTH] IN BLOOD BY AUTOMATED COUNT: 12.5 % (ref 11–15)
GFR/BSA.PRED SERPLBLD CYS-BASED-ARV: 80 ML/MIN/1.73M2
GLOBULIN SER CALC-MCNC: 2.4 G/DL (CALC) (ref 1.9–3.7)
GLUCOSE SERPL-MCNC: 95 MG/DL (ref 65–99)
HCT VFR BLD AUTO: 38.1 % (ref 38.5–50)
HDLC SERPL-MCNC: 51 MG/DL
HGB BLD-MCNC: 13.3 G/DL (ref 13.2–17.1)
LDLC SERPL CALC-MCNC: 167 MG/DL (CALC)
MCH RBC QN AUTO: 30.7 PG (ref 27–33)
MCHC RBC AUTO-ENTMCNC: 34.9 G/DL (ref 32–36)
MCV RBC AUTO: 88 FL (ref 80–100)
NONHDLC SERPL-MCNC: 196 MG/DL (CALC)
PLATELET # BLD AUTO: 200 THOUSAND/UL (ref 140–400)
PMV BLD REES-ECKER: 9.3 FL (ref 7.5–12.5)
POTASSIUM SERPL-SCNC: 4.3 MMOL/L (ref 3.5–5.3)
PROT SERPL-MCNC: 7 G/DL (ref 6.1–8.1)
RBC # BLD AUTO: 4.33 MILLION/UL (ref 4.2–5.8)
SODIUM SERPL-SCNC: 139 MMOL/L (ref 135–146)
TRIGL SERPL-MCNC: 146 MG/DL
WBC # BLD AUTO: 3.4 THOUSAND/UL (ref 3.8–10.8)

## 2023-08-24 DIAGNOSIS — E78.2 MIXED HYPERLIPIDEMIA: Primary | ICD-10-CM

## 2023-08-24 RX ORDER — ROSUVASTATIN CALCIUM 10 MG/1
10 TABLET, COATED ORAL DAILY
Qty: 90 TABLET | Refills: 3 | Status: SHIPPED | OUTPATIENT
Start: 2023-08-24 | End: 2023-09-18 | Stop reason: SDUPTHER

## 2023-09-18 ENCOUNTER — OFFICE VISIT (OUTPATIENT)
Dept: FAMILY MEDICINE CLINIC | Facility: CLINIC | Age: 58
End: 2023-09-18
Payer: COMMERCIAL

## 2023-09-18 VITALS
HEART RATE: 65 BPM | BODY MASS INDEX: 29.76 KG/M2 | WEIGHT: 196.38 LBS | TEMPERATURE: 97.7 F | DIASTOLIC BLOOD PRESSURE: 72 MMHG | HEIGHT: 68 IN | SYSTOLIC BLOOD PRESSURE: 126 MMHG | RESPIRATION RATE: 16 BRPM

## 2023-09-18 DIAGNOSIS — Z00.00 WELL ADULT EXAM: Primary | ICD-10-CM

## 2023-09-18 DIAGNOSIS — C61 PROSTATE CANCER (HCC): ICD-10-CM

## 2023-09-18 DIAGNOSIS — E78.2 MIXED HYPERLIPIDEMIA: ICD-10-CM

## 2023-09-18 DIAGNOSIS — Z23 NEED FOR VACCINATION: ICD-10-CM

## 2023-09-18 PROCEDURE — 99396 PREV VISIT EST AGE 40-64: CPT | Performed by: FAMILY MEDICINE

## 2023-09-18 PROCEDURE — 90471 IMMUNIZATION ADMIN: CPT

## 2023-09-18 PROCEDURE — 90686 IIV4 VACC NO PRSV 0.5 ML IM: CPT

## 2023-09-18 RX ORDER — ROSUVASTATIN CALCIUM 20 MG/1
20 TABLET, COATED ORAL DAILY
Qty: 90 TABLET | Refills: 1 | Status: SHIPPED | OUTPATIENT
Start: 2023-09-18

## 2023-09-18 NOTE — PROGRESS NOTES
FAMILY PRACTICE HEALTH MAINTENANCE OFFICE VISIT  [facility]    NAME: Jaden Rutherford  AGE: 61 y.o. SEX: male  : 1965     DATE: 2023    Assessment and Plan     1. Well adult exam    2. Prostate cancer ([address])    3. Mixed hyperlipidemia  -     rosuvastatin (CRESTOR) 20 MG tablet; Take 1 tablet (20 mg total) by mouth daily  -     Comprehensive metabolic panel; Future; Expected date: 2023  -     Lipid Panel with Direct LDL reflex; Future; Expected date: 2023    4. Need for vaccination  -     influenza vaccine, quadrivalent, 0.5 mL, preservative-free        Patient Counseling:   Nutrition: Stressed importance of a well balanced diet, moderation of sodium/saturated fat, caloric balance and sufficient intake of fiber  Exercise: Stressed the importance of regular exercise with a goal of 150 minutes per week  Dental Health: Discussed daily flossing and brushing and regular dental visits     Immunizations reviewed: See Orders  Discussed benefits of:  Screening labs. BMI Counseling: Body mass index is 29.64 kg/m². Discussed with patient's BMI with him. The BMI is above normal. Nutrition recommendations include reducing portion sizes. No follow-ups on file.         Chief Complaint     Chief Complaint   Patient presents with   • Annual Exam     Lw cma       History of Present Illness     Pt is sched for a full physical        Pt was seen by the medical board - he was granted medical prison      Well Adult Physical   Patient here for a comprehensive physical exam.      Diet and Physical Activity  Diet: well balanced diet  Exercise: intermittently      Depression Screen  PHQ-2/9 Depression Screening            General Health  Hearing: Normal:  bilateral  Vision: wears glasses  Dental: regular dental visits    Reproductive Health  No issues       The following portions of the patient's history were reviewed and updated as appropriate: allergies, current medications, past family history, past medical history, past social history, past surgical history and problem list.    Review of Systems     Review of Systems   Constitutional: Negative for activity change, appetite change, chills, diaphoresis, fatigue, fever and unexpected weight change. HENT: Negative for congestion, dental problem, ear pain, mouth sores, sinus pressure, sinus pain, sore throat and trouble swallowing. Eyes: Negative for photophobia, discharge and itching. Respiratory: Negative for apnea, chest tightness and shortness of breath. Cardiovascular: Negative for chest pain, palpitations and leg swelling. Gastrointestinal: Negative for abdominal distention, abdominal pain, blood in stool, nausea and vomiting. Endocrine: Negative for cold intolerance, heat intolerance, polydipsia, polyphagia and polyuria. Genitourinary: Negative for difficulty urinating. Musculoskeletal: Positive for arthralgias, gait problem and myalgias. Skin: Negative for color change and wound. Neurological: Negative for dizziness, syncope, speech difficulty and headaches. Hematological: Negative for adenopathy. Psychiatric/Behavioral: Negative for agitation and behavioral problems.        Past Medical History     Past Medical History:   Diagnosis Date   • Cancer [facility])     prostate   • Colon polyp    • Dermatitis     Resolved 3/31/2016    • Dyshidrosis     Resolved 3/31/2016        Past Surgical History     Past Surgical History:   Procedure Laterality Date   • COLONOSCOPY     • CT SURGICAL ARTHROSCOPY SHOULDER W/ROTATOR CUFF RPR Left 10/14/2019    Procedure: SHOULDER ARTHROSCOPY, ROTATOR CUFF REPAIR AND SUBACROMIAL DECOMPRESSION;  Surgeon: Primitivo James MD;  Location: [facility];  Service: Orthopedics   • ROTATOR CUFF REPAIR     • SKIN BIOPSY     • US GUIDED PROSTATE BIOPSY  10/5/2021   • VASECTOMY         Social History     Social History     Socioeconomic History   • Marital status: /Civil Union     Spouse name: None   • Number of children: None   • Years of education: None   • Highest education level: None   Occupational History   • None   Tobacco Use   • Smoking status: Never   • Smokeless tobacco: Never   Vaping Use   • Vaping Use: Never used   Substance and Sexual Activity   • Alcohol use: Never   • Drug use: Never   • Sexual activity: None   Other Topics Concern   • None   Social History Narrative   • None     Social Determinants of Health     Financial Resource Strain: Not on file   Food Insecurity: Not on file   Transportation Needs: Not on file   Physical Activity: Not on file   Stress: Not on file   Social Connections: Not on file   Intimate Partner Violence: Not on file   Housing Stability: Not on file       Family History     Family History   Problem Relation Age of Onset   • Arthritis Mother    • Deep vein thrombosis Mother    • Prostate cancer Father    • Other Son         Blurring vision. Vision problems due to auto-immune.      • No Known Problems Sister    • No Known Problems Brother    • No Known Problems Maternal Aunt    • No Known Problems Maternal Uncle    • No Known Problems Paternal Aunt    • No Known Problems Paternal Uncle    • No Known Problems Maternal Grandmother    • No Known Problems Maternal Grandfather    • No Known Problems Paternal Grandmother    • No Known Problems Paternal Grandfather    • Mental illness Neg Hx        Current Medications       Current Outpatient Medications:   •  acetaminophen (TYLENOL) 325 mg tablet, Take 650 mg by mouth every 6 (six) hours as needed for mild pain, Disp: , Rfl:   •  gabapentin (Neurontin) 300 mg capsule, Take 2 capsules (600 mg total) by mouth 3 (three) times a day, Disp: 540 capsule, Rfl: 0  •  rosuvastatin (CRESTOR) 20 MG tablet, Take 1 tablet (20 mg total) by mouth daily, Disp: 90 tablet, Rfl: 1  •  sildenafil (VIAGRA) 100 mg tablet, Take 100 mg by mouth Once daily as needed, Disp: , Rfl:   •  tamsulosin (FLOMAX) 0.4 mg, Take 0.4 mg by mouth, Disp: , Rfl:      Allergies     No Known Allergies    Objective     /72   Pulse 65   Temp 97.7 °F (36.5 °C) (Tympanic)   Resp 16   Ht 5' 8.25" (1.734 m)   Wt 89.1 kg (196 lb 6 oz)   BMI 29.64 kg/m²      Physical Exam  Vitals and nursing note reviewed. Constitutional:       General: He is not in acute distress. Appearance: He is well-developed. He is not diaphoretic. HENT:      Head: Normocephalic and atraumatic. Right Ear: External ear normal.      Left Ear: External ear normal.      Nose: Nose normal.      Mouth/Throat:      Pharynx: No oropharyngeal exudate. Eyes:      General: No scleral icterus. Right eye: No discharge. Left eye: No discharge. Pupils: Pupils are equal, round, and reactive to light. Neck:      Thyroid: No thyromegaly. Cardiovascular:      Rate and Rhythm: Normal rate. Heart sounds: Normal heart sounds. No murmur heard. Pulmonary:      Effort: Pulmonary effort is normal. No respiratory distress. Breath sounds: Normal breath sounds. No wheezing. Abdominal:      General: Bowel sounds are normal. There is no distension. Palpations: Abdomen is soft. There is no mass. Tenderness: There is no abdominal tenderness. There is no guarding or rebound. Musculoskeletal:         General: Normal range of motion. Comments: Ambulates with a cane   Skin:     General: Skin is warm and dry. Findings: No erythema or rash. Neurological:      Mental Status: He is alert.       Coordination: Coordination normal.      Deep Tendon Reflexes: Reflexes normal.   Psychiatric:         Behavior: Behavior normal.           Vision Screening    Right eye Left eye Both eyes   Without correction      With correction 20/20 20/20 20/13       Recent Results (from the past 2016 hour(s))   Lipid Panel with Direct LDL reflex    Collection Time: 08/19/23  9:45 AM   Result Value Ref Range    Total Cholesterol 247 (H) <200 mg/dL    HDL 51 > OR = 40 mg/dL    Triglycerides 146 <150 mg/dL    LDL Calculated 167 (H) mg/dL (calc)    Chol HDLC Ratio 4.8 <5.0 (calc)    Non-HDL Cholesterol 196 (H) <130 mg/dL (calc)   Comprehensive metabolic panel    Collection Time: 08/19/23  9:45 AM   Result Value Ref Range    Glucose, Random 95 65 - 99 mg/dL    BUN 13 7 - 25 mg/dL    Creatinine 1.07 0.70 - 1.30 mg/dL    eGFR 80 > OR = 60 mL/min/1.73m2    SL AMB BUN/CREATININE RATIO SEE NOTE: 6 - 22 (calc)    Sodium 139 135 - 146 mmol/L    Potassium 4.3 3.5 - 5.3 mmol/L    Chloride 104 98 - 110 mmol/L    CO2 29 20 - 32 mmol/L    Calcium 9.8 8.6 - 10.3 mg/dL    Protein, Total 7.0 6.1 - 8.1 g/dL    Albumin 4.6 3.6 - 5.1 g/dL    Globulin 2.4 1.9 - 3.7 g/dL (calc)    Albumin/Globulin Ratio 1.9 1.0 - 2.5 (calc)    TOTAL BILIRUBIN 1.1 0.2 - 1.2 mg/dL    Alkaline Phosphatase 56 35 - 144 U/L    AST 17 10 - 35 U/L    ALT 12 9 - 46 U/L   CBC    Collection Time: 08/19/23  9:45 AM   Result Value Ref Range    White Blood Cell Count 3.4 (L) 3.8 - 10.8 Thousand/uL    Red Blood Cell Count 4.33 4.20 - 5.80 Million/uL    Hemoglobin 13.3 13.2 - 17.1 g/dL    HCT 38.1 (L) 38.5 - 50.0 %    MCV 88.0 80.0 - 100.0 fL    MCH 30.7 27.0 - 33.0 pg    MCHC 34.9 32.0 - 36.0 g/dL    RDW 12.5 11.0 - 15.0 %    Platelet Count 144 279 - 400 Thousand/uL    SL AMB MPV 9.3 7.5 - 12.5 fL           Saulo Caro, [facility], [address]

## 2023-11-19 DIAGNOSIS — M79.2 NEUROPATHIC PAIN: ICD-10-CM

## 2023-11-20 RX ORDER — GABAPENTIN 300 MG/1
CAPSULE ORAL
Qty: 540 CAPSULE | Refills: 0 | Status: SHIPPED | OUTPATIENT
Start: 2023-11-20

## 2023-11-28 LAB
ALBUMIN SERPL-MCNC: 4.4 G/DL (ref 3.6–5.1)
ALBUMIN/GLOB SERPL: 1.9 (CALC) (ref 1–2.5)
ALP SERPL-CCNC: 56 U/L (ref 35–144)
ALT SERPL-CCNC: 12 U/L (ref 9–46)
AST SERPL-CCNC: 14 U/L (ref 10–35)
BILIRUB SERPL-MCNC: 0.6 MG/DL (ref 0.2–1.2)
BUN SERPL-MCNC: 12 MG/DL (ref 7–25)
BUN/CREAT SERPL: NORMAL (CALC) (ref 6–22)
CALCIUM SERPL-MCNC: 9.2 MG/DL (ref 8.6–10.3)
CHLORIDE SERPL-SCNC: 106 MMOL/L (ref 98–110)
CHOLEST SERPL-MCNC: 208 MG/DL
CHOLEST/HDLC SERPL: 4.2 (CALC)
CO2 SERPL-SCNC: 28 MMOL/L (ref 20–32)
CREAT SERPL-MCNC: 1.08 MG/DL (ref 0.7–1.3)
GFR/BSA.PRED SERPLBLD CYS-BASED-ARV: 79 ML/MIN/1.73M2
GLOBULIN SER CALC-MCNC: 2.3 G/DL (CALC) (ref 1.9–3.7)
GLUCOSE SERPL-MCNC: 99 MG/DL (ref 65–99)
HDLC SERPL-MCNC: 49 MG/DL
LDLC SERPL CALC-MCNC: 138 MG/DL (CALC)
NONHDLC SERPL-MCNC: 159 MG/DL (CALC)
POTASSIUM SERPL-SCNC: 4 MMOL/L (ref 3.5–5.3)
PROT SERPL-MCNC: 6.7 G/DL (ref 6.1–8.1)
SODIUM SERPL-SCNC: 141 MMOL/L (ref 135–146)
TRIGL SERPL-MCNC: 105 MG/DL

## 2023-11-29 NOTE — RESULT ENCOUNTER NOTE
Lipids have improved. Cont on current dose we will discuss modification of meds at next follow up. Not at goal but certainly closer.

## 2024-02-20 DIAGNOSIS — M79.2 NEUROPATHIC PAIN: ICD-10-CM

## 2024-02-20 RX ORDER — GABAPENTIN 300 MG/1
CAPSULE ORAL
Qty: 540 CAPSULE | Refills: 1 | Status: SHIPPED | OUTPATIENT
Start: 2024-02-20

## 2024-04-30 ENCOUNTER — TELEPHONE (OUTPATIENT)
Age: 59
End: 2024-04-30

## 2024-04-30 DIAGNOSIS — Z13.6 SCREENING FOR CARDIOVASCULAR CONDITION: ICD-10-CM

## 2024-04-30 DIAGNOSIS — R52 BODY ACHES: ICD-10-CM

## 2024-04-30 DIAGNOSIS — E78.2 MIXED HYPERLIPIDEMIA: Primary | ICD-10-CM

## 2024-04-30 NOTE — TELEPHONE ENCOUNTER
Looks like he made a 15 minute appt for an urgenyt care follow up.  Appt should be changed raquel a 30 min appt.  Please do so.  I will order labs for him  
Pt called to request to have Dr Lombardi place bloodwork orders for him.  He went to urgent care.  His bloating had resolved at that point and Tylenol had relieved his body aches and back pain somewhat.  He apparently asked them to order bloodwork and they told him they do not do that, and that he should ask his PCP to place those orders.  He has an appt scheduled for Friday 5/3/24 with Dr Lombardi.  He is requesting bloodwork so he can have it done prior to that appt and it can be reviewed then.  Please call pt if bloodwork orders can be placed  
Spoke with patient and gave them providers message.  NFA  Cary Turner, PRASAD      
Spoke with patient and gave them providers message.  NFA  Cary Turner, PRASAD      
The patient called he would like to see Dr Lombardi   today   he is not feeling well he has body aches , fatigue , weakness ,stomach bloating and pain in his lower back   please call him thank you      
ordered  
yes  
Ambulatory

## 2024-05-01 LAB
ALBUMIN SERPL-MCNC: 4.4 G/DL (ref 3.6–5.1)
ALBUMIN/GLOB SERPL: 2 (CALC) (ref 1–2.5)
ALP SERPL-CCNC: 52 U/L (ref 35–144)
ALT SERPL-CCNC: 12 U/L (ref 9–46)
AST SERPL-CCNC: 16 U/L (ref 10–35)
BILIRUB SERPL-MCNC: 0.7 MG/DL (ref 0.2–1.2)
BUN SERPL-MCNC: 12 MG/DL (ref 7–25)
BUN/CREAT SERPL: NORMAL (CALC) (ref 6–22)
CALCIUM SERPL-MCNC: 9.1 MG/DL (ref 8.6–10.3)
CHLORIDE SERPL-SCNC: 105 MMOL/L (ref 98–110)
CO2 SERPL-SCNC: 30 MMOL/L (ref 20–32)
CREAT SERPL-MCNC: 0.94 MG/DL (ref 0.7–1.3)
GFR/BSA.PRED SERPLBLD CYS-BASED-ARV: 93 ML/MIN/1.73M2
GLOBULIN SER CALC-MCNC: 2.2 G/DL (CALC) (ref 1.9–3.7)
GLUCOSE SERPL-MCNC: 99 MG/DL (ref 65–99)
POTASSIUM SERPL-SCNC: 4 MMOL/L (ref 3.5–5.3)
PROT SERPL-MCNC: 6.6 G/DL (ref 6.1–8.1)
SODIUM SERPL-SCNC: 142 MMOL/L (ref 135–146)

## 2024-05-03 ENCOUNTER — OFFICE VISIT (OUTPATIENT)
Dept: FAMILY MEDICINE CLINIC | Facility: CLINIC | Age: 59
End: 2024-05-03
Payer: COMMERCIAL

## 2024-05-03 VITALS
TEMPERATURE: 97.9 F | HEART RATE: 57 BPM | DIASTOLIC BLOOD PRESSURE: 66 MMHG | SYSTOLIC BLOOD PRESSURE: 114 MMHG | BODY MASS INDEX: 28.19 KG/M2 | HEIGHT: 68 IN | WEIGHT: 186 LBS | RESPIRATION RATE: 17 BRPM

## 2024-05-03 DIAGNOSIS — R06.00 DYSPNEA, UNSPECIFIED TYPE: ICD-10-CM

## 2024-05-03 DIAGNOSIS — R10.813 RIGHT LOWER QUADRANT ABDOMINAL TENDERNESS, REBOUND TENDERNESS PRESENCE NOT SPECIFIED: ICD-10-CM

## 2024-05-03 DIAGNOSIS — E78.2 MIXED HYPERLIPIDEMIA: ICD-10-CM

## 2024-05-03 DIAGNOSIS — C61 PROSTATE CANCER (HCC): ICD-10-CM

## 2024-05-03 DIAGNOSIS — R53.83 OTHER FATIGUE: ICD-10-CM

## 2024-05-03 DIAGNOSIS — R14.0 ABDOMINAL BLOATING: Primary | ICD-10-CM

## 2024-05-03 DIAGNOSIS — Z13.6 SCREENING FOR CARDIOVASCULAR CONDITION: ICD-10-CM

## 2024-05-03 PROCEDURE — 99214 OFFICE O/P EST MOD 30 MIN: CPT | Performed by: FAMILY MEDICINE

## 2024-05-03 RX ORDER — OXYBUTYNIN CHLORIDE 10 MG/1
TABLET, EXTENDED RELEASE ORAL AS NEEDED
COMMUNITY
Start: 2024-05-02

## 2024-05-03 NOTE — PROGRESS NOTES
Assessment/Plan:    1. Abdominal bloating  -     CBC; Future    2. Prostate cancer (HCC)  -     CBC; Future  -     CT abdomen pelvis wo contrast; Future; Expected date: 05/03/2024    3. Other fatigue  -     CBC; Future  -     TSH, 3rd generation; Future  -     Lyme Total AB W Reflex to IGM/IGG; Future  -     LESLIE Comprehensive Panel; Future    4. Dyspnea, unspecified type  -     CBC; Future  -     TSH, 3rd generation; Future  -     Lyme Total AB W Reflex to IGM/IGG; Future  -     LESLIE Comprehensive Panel; Future    5. Screening for cardiovascular condition  -     CBC; Future  -     Lipid Panel with Direct LDL reflex; Future    6. Right lower quadrant abdominal tenderness, rebound tenderness presence not specified  -     CT abdomen pelvis wo contrast; Future; Expected date: 05/03/2024    7. Mixed hyperlipidemia    Offered a stress test for the dyspnea with stairs - pt thuinks its from the prostate treatment.         There are no Patient Instructions on file for this visit.    No follow-ups on file.    Subjective:      Patient ID: Didier Sharif is a 58 y.o. male.    Chief Complaint   Patient presents with   • Bloated     Had symptoms that started on Friday, feeling better now. Alea Bertrand LPN     • Generalized Body Aches   • Follow-up     Bloodwork review   • Joint Pain     Continuous joint pain in fingers and hands    • Extremity Weakness     Had body weakness, improving but still having some lightheadedness when it is hot       Pt is here to follow up labs  I only have a Cmp resulted - Nurse will go on quest site to find labs    Pt states Friday evening he had dinner and went for a walk - 3 miles or so  He came back and states he started having a bloated somach - states this continued - Saturday and Sunday  He did drink a lot of water  He took Dulcolax - this helped him.  States he felt like constpation  Sunday afternoon the pain went to his back  States he had muscle achs  Took tylenol - he responded and then  "had chills again    Since th achs and pains and bloating has improved    Pt states he is however deakling with his regular medical issues - pain in his finger  joints - seems to be side affects of his treatment of the oprostate  Also has incontinent issues - pt states he told urology and was given oxybutynin to take as needed.      Pt states he still deals with lightheadedness when the weather is hot or walking up steps.  Also gets tired    Pt states he experiences difficulty gerring out of bed - generally has low energy.      Pt states he could not take the meds for the lipids.  States it was making his body achy.  Also causing appetite issues.  Pt states he started taking fish oil inseatd                The following portions of the patient's history were reviewed and updated as appropriate: allergies, current medications, past family history, past medical history, past social history, past surgical history and problem list.    Review of Systems   Constitutional:  Positive for chills and fatigue.   Gastrointestinal:  Positive for abdominal pain.         Current Outpatient Medications   Medication Sig Dispense Refill   • acetaminophen (TYLENOL) 325 mg tablet Take 650 mg by mouth every 6 (six) hours as needed for mild pain     • gabapentin (NEURONTIN) 300 mg capsule TAKE 2 CAPSULES(600 MG) BY MOUTH THREE TIMES DAILY 540 capsule 1   • Omega-3 Fatty Acids (FISH OIL PO) Take by mouth     • oxybutynin (DITROPAN-XL) 10 MG 24 hr tablet if needed     • sildenafil (VIAGRA) 100 mg tablet Take 100 mg by mouth Once daily as needed     • tamsulosin (FLOMAX) 0.4 mg Take 0.4 mg by mouth       No current facility-administered medications for this visit.       Objective:    /66   Pulse 57   Temp 97.9 °F (36.6 °C)   Resp 17   Ht 5' 8.25\" (1.734 m)   Wt 84.4 kg (186 lb) Comment: patient reported -  weight today  BMI 28.07 kg/m²        Physical Exam  Vitals and nursing note reviewed.   Constitutional:       General: He is " not in acute distress.     Appearance: He is well-developed. He is not diaphoretic.   HENT:      Head: Normocephalic and atraumatic.      Right Ear: External ear normal.      Left Ear: External ear normal.      Nose: Nose normal.      Mouth/Throat:      Pharynx: No oropharyngeal exudate.   Eyes:      General: No scleral icterus.        Right eye: No discharge.         Left eye: No discharge.      Pupils: Pupils are equal, round, and reactive to light.   Neck:      Thyroid: No thyromegaly.   Cardiovascular:      Rate and Rhythm: Normal rate.      Heart sounds: Normal heart sounds. No murmur heard.  Pulmonary:      Effort: Pulmonary effort is normal. No respiratory distress.      Breath sounds: Normal breath sounds. No wheezing.   Abdominal:      General: Bowel sounds are normal. There is no distension.      Palpations: Abdomen is soft. There is no mass.      Tenderness: There is no abdominal tenderness. There is no guarding or rebound.   Musculoskeletal:         General: Normal range of motion.   Skin:     General: Skin is warm and dry.      Findings: No erythema or rash.   Neurological:      Mental Status: He is alert.      Coordination: Coordination normal.      Deep Tendon Reflexes: Reflexes normal.   Psychiatric:         Behavior: Behavior normal.                Frank Lombardi, DO

## 2024-05-06 LAB
ANA SER QL IF: NEGATIVE
B BURGDOR AB SER IA-ACNC: <0.9 INDEX
BASOPHILS # BLD AUTO: 21 CELLS/UL (ref 0–200)
BASOPHILS NFR BLD AUTO: 0.6 %
CHOLEST SERPL-MCNC: 193 MG/DL
CHOLEST/HDLC SERPL: 4 (CALC)
EOSINOPHIL # BLD AUTO: 140 CELLS/UL (ref 15–500)
EOSINOPHIL NFR BLD AUTO: 4 %
ERYTHROCYTE [DISTWIDTH] IN BLOOD BY AUTOMATED COUNT: 12.5 % (ref 11–15)
HCT VFR BLD AUTO: 40.2 % (ref 38.5–50)
HDLC SERPL-MCNC: 48 MG/DL
HGB BLD-MCNC: 13.6 G/DL (ref 13.2–17.1)
LDLC SERPL CALC-MCNC: 125 MG/DL (CALC)
LYMPHOCYTES # BLD AUTO: 1579 CELLS/UL (ref 850–3900)
LYMPHOCYTES NFR BLD AUTO: 45.1 %
MCH RBC QN AUTO: 29.8 PG (ref 27–33)
MCHC RBC AUTO-ENTMCNC: 33.8 G/DL (ref 32–36)
MCV RBC AUTO: 88 FL (ref 80–100)
MONOCYTES # BLD AUTO: 210 CELLS/UL (ref 200–950)
MONOCYTES NFR BLD AUTO: 6 %
NEUTROPHILS # BLD AUTO: 1551 CELLS/UL (ref 1500–7800)
NEUTROPHILS NFR BLD AUTO: 44.3 %
NONHDLC SERPL-MCNC: 145 MG/DL (CALC)
PLATELET # BLD AUTO: 215 THOUSAND/UL (ref 140–400)
PMV BLD REES-ECKER: 9.5 FL (ref 7.5–12.5)
RBC # BLD AUTO: 4.57 MILLION/UL (ref 4.2–5.8)
TRIGL SERPL-MCNC: 96 MG/DL
TSH SERPL-ACNC: 1.32 MIU/L (ref 0.4–4.5)
WBC # BLD AUTO: 3.5 THOUSAND/UL (ref 3.8–10.8)

## 2024-05-14 ENCOUNTER — TELEPHONE (OUTPATIENT)
Dept: FAMILY MEDICINE CLINIC | Facility: CLINIC | Age: 59
End: 2024-05-14

## 2024-05-14 DIAGNOSIS — E78.2 MIXED HYPERLIPIDEMIA: Primary | ICD-10-CM

## 2024-05-14 RX ORDER — ROSUVASTATIN CALCIUM 10 MG/1
10 TABLET, COATED ORAL DAILY
Start: 2024-05-14

## 2024-05-14 NOTE — TELEPHONE ENCOUNTER
Spoke to the patient, he stated that he wants to start at 10mg. [place] is out of the [place] but will be back on Sunday. He has 10mg Rosuvastatin at home, he stated that he can start that on Sunday.    Alea Bertrand LPN

## 2024-05-14 NOTE — TELEPHONE ENCOUNTER
Looking through labs looks like pt has elevated LDL.  I would suggest a low doase statin to try to bring this down and repeat labs in three months.  If pt is agreeable I will place orders

## 2024-05-20 ENCOUNTER — HOSPITAL ENCOUNTER (OUTPATIENT)
Dept: CT IMAGING | Facility: HOSPITAL | Age: 59
Discharge: HOME | End: 2024-05-20
Payer: COMMERCIAL

## 2024-05-20 DIAGNOSIS — C61 PROSTATE CANCER (HCC): ICD-10-CM

## 2024-05-20 DIAGNOSIS — R10.813 RIGHT LOWER QUADRANT ABDOMINAL TENDERNESS, REBOUND TENDERNESS PRESENCE NOT SPECIFIED: ICD-10-CM

## 2024-05-20 PROCEDURE — 74176 CT ABD & PELVIS W/O CONTRAST: CPT

## 2024-05-29 ENCOUNTER — TELEPHONE (OUTPATIENT)
Dept: FAMILY MEDICINE CLINIC | Facility: CLINIC | Age: 59
End: 2024-05-29

## 2024-05-29 NOTE — TELEPHONE ENCOUNTER
Called pt to discuss results.  Advised the Ct scan shows possible recurrent disease in the pelvis.  Pt has seen the study and he has already forwarded it to his oncology team.  Has a PSA to get for them.  Pt advised to continue follow up with them ASAp

## 2024-06-12 ENCOUNTER — TELEPHONE (OUTPATIENT)
Age: 59
End: 2024-06-12

## 2024-06-12 NOTE — TELEPHONE ENCOUNTER
The patient called, he wants to talk to Dr. Lombardi about some bad news he has for him.    Please Dr. Lombardi contact the patient

## 2024-06-12 NOTE — TELEPHONE ENCOUNTER
Called pt - had Pet - pt looks like he had metastatic spead of trhe prostate to bone nodes etc.  Pt seen by his surgeon - was suggested to possibly see med oncology - pt does not want to do chemo  Pt is going to try for holistic treatment in [place]

## 2024-08-09 ENCOUNTER — TELEPHONE (OUTPATIENT)
Age: 59
End: 2024-08-09

## 2024-08-09 NOTE — TELEPHONE ENCOUNTER
[facility] Disability stating they are looking for fax that they faxed over yesterday.     Please make sure this is not a scam as the could not give patient's address or phone number.  Please check solar[employer].    The number that was given was [phone]

## 2024-08-15 ENCOUNTER — TELEPHONE (OUTPATIENT)
Dept: FAMILY MEDICINE CLINIC | Facility: CLINIC | Age: 59
End: 2024-08-15

## 2024-08-15 NOTE — TELEPHONE ENCOUNTER
Patient's daughter dropped off 'Fatigue Questionnaire' for social security benefits to be filled out by Dr. Lombardi. Form is in white envelope. Placed envelope in Nurse Pending 1 basket. Please call [facility] [phone] when form is completed

## 2024-08-16 ENCOUNTER — HOSPITAL ENCOUNTER (EMERGENCY)
Facility: HOSPITAL | Age: 59
Discharge: HOME | End: 2024-08-16
Attending: EMERGENCY MEDICINE
Payer: COMMERCIAL

## 2024-08-16 ENCOUNTER — APPOINTMENT (EMERGENCY)
Dept: RADIOLOGY | Facility: HOSPITAL | Age: 59
End: 2024-08-16
Payer: COMMERCIAL

## 2024-08-16 VITALS
HEART RATE: 53 BPM | RESPIRATION RATE: 18 BRPM | DIASTOLIC BLOOD PRESSURE: 79 MMHG | TEMPERATURE: 97.6 F | OXYGEN SATURATION: 100 % | SYSTOLIC BLOOD PRESSURE: 126 MMHG

## 2024-08-16 DIAGNOSIS — R07.89 MUSCULOSKELETAL CHEST PAIN: Primary | ICD-10-CM

## 2024-08-16 LAB
ALBUMIN SERPL BCG-MCNC: 4.7 G/DL (ref 3.5–5)
ALP SERPL-CCNC: 102 U/L (ref 34–104)
ALT SERPL W P-5'-P-CCNC: 8 U/L (ref 7–52)
ANION GAP SERPL CALCULATED.3IONS-SCNC: 4 MMOL/L (ref 4–13)
AST SERPL W P-5'-P-CCNC: 20 U/L (ref 13–39)
BASOPHILS # BLD AUTO: 0.01 THOUSANDS/ÂΜL (ref 0–0.1)
BASOPHILS NFR BLD AUTO: 0 % (ref 0–1)
BILIRUB SERPL-MCNC: 1.72 MG/DL (ref 0.2–1)
BUN SERPL-MCNC: 8 MG/DL (ref 5–25)
CALCIUM SERPL-MCNC: 9.7 MG/DL (ref 8.4–10.2)
CARDIAC TROPONIN I PNL SERPL HS: <2 NG/L
CHLORIDE SERPL-SCNC: 105 MMOL/L (ref 96–108)
CO2 SERPL-SCNC: 29 MMOL/L (ref 21–32)
CREAT SERPL-MCNC: 0.85 MG/DL (ref 0.6–1.3)
EOSINOPHIL # BLD AUTO: 0.03 THOUSAND/ÂΜL (ref 0–0.61)
EOSINOPHIL NFR BLD AUTO: 1 % (ref 0–6)
ERYTHROCYTE [DISTWIDTH] IN BLOOD BY AUTOMATED COUNT: 13.5 % (ref 11.6–15.1)
GFR SERPL CREATININE-BSD FRML MDRD: 95 ML/MIN/1.73SQ M
GLUCOSE SERPL-MCNC: 91 MG/DL (ref 65–140)
HCT VFR BLD AUTO: 38.6 % (ref 36.5–49.3)
HGB BLD-MCNC: 12.5 G/DL (ref 12–17)
IMM GRANULOCYTES # BLD AUTO: 0.01 THOUSAND/UL (ref 0–0.2)
IMM GRANULOCYTES NFR BLD AUTO: 0 % (ref 0–2)
LYMPHOCYTES # BLD AUTO: 1.06 THOUSANDS/ÂΜL (ref 0.6–4.47)
LYMPHOCYTES NFR BLD AUTO: 24 % (ref 14–44)
MCH RBC QN AUTO: 29.3 PG (ref 26.8–34.3)
MCHC RBC AUTO-ENTMCNC: 32.4 G/DL (ref 31.4–37.4)
MCV RBC AUTO: 91 FL (ref 82–98)
MONOCYTES # BLD AUTO: 0.28 THOUSAND/ÂΜL (ref 0.17–1.22)
MONOCYTES NFR BLD AUTO: 6 % (ref 4–12)
NEUTROPHILS # BLD AUTO: 2.96 THOUSANDS/ÂΜL (ref 1.85–7.62)
NEUTS SEG NFR BLD AUTO: 69 % (ref 43–75)
NRBC BLD AUTO-RTO: 0 /100 WBCS
PLATELET # BLD AUTO: 186 THOUSANDS/UL (ref 149–390)
PMV BLD AUTO: 9.8 FL (ref 8.9–12.7)
POTASSIUM SERPL-SCNC: 4.7 MMOL/L (ref 3.5–5.3)
PROT SERPL-MCNC: 7.7 G/DL (ref 6.4–8.4)
RBC # BLD AUTO: 4.26 MILLION/UL (ref 3.88–5.62)
SODIUM SERPL-SCNC: 138 MMOL/L (ref 135–147)
WBC # BLD AUTO: 4.35 THOUSAND/UL (ref 4.31–10.16)

## 2024-08-16 PROCEDURE — 99285 EMERGENCY DEPT VISIT HI MDM: CPT | Performed by: EMERGENCY MEDICINE

## 2024-08-16 PROCEDURE — 80053 COMPREHEN METABOLIC PANEL: CPT

## 2024-08-16 PROCEDURE — 71045 X-RAY EXAM CHEST 1 VIEW: CPT

## 2024-08-16 PROCEDURE — 85025 COMPLETE CBC W/AUTO DIFF WBC: CPT

## 2024-08-16 PROCEDURE — 84484 ASSAY OF TROPONIN QUANT: CPT

## 2024-08-16 PROCEDURE — 93005 ELECTROCARDIOGRAM TRACING: CPT

## 2024-08-16 PROCEDURE — 36415 COLL VENOUS BLD VENIPUNCTURE: CPT

## 2024-08-16 PROCEDURE — 99285 EMERGENCY DEPT VISIT HI MDM: CPT

## 2024-08-16 RX ORDER — LIDOCAINE 50 MG/G
1 PATCH TOPICAL ONCE
Status: DISCONTINUED | OUTPATIENT
Start: 2024-08-16 | End: 2024-08-16 | Stop reason: HOSPADM

## 2024-08-16 RX ADMIN — LIDOCAINE 1 PATCH: 700 PATCH TOPICAL at 14:07

## 2024-08-16 NOTE — ED PROVIDER NOTES
History  Chief Complaint   Patient presents with    Chest Pain     C/o  R sided chest pain going on for 1 week. States, it  feels like it is underneath there ribs. Stage 4 prostate cancer. Denies n/v. C/o abdominal pain     58-year-old male with history of stage IV prostate cancer presenting for 1 week of right-sided chest pain.  No known precipitating factor.  Reports pain is on the right side and feels musculoskeletal.  Intermittent, worsens with deep breathing or laying on the right side.  Has not needed any pain\  medication.  Was diagnosed with stage IV prostate cancer this year and has been managing through holistic medicine.  Has not had chemo or radiation therapy for this.  Also has baseline abdominal pain with no acute changes.  No recent fever, headache, shortness of breath, abdominal pain, or other acute symptoms.        Chest Pain  Associated symptoms: no abdominal pain, no back pain, no cough, no fever, no palpitations, no shortness of breath and not vomiting        Prior to Admission Medications   Prescriptions Last Dose Informant Patient Reported? Taking?   Omega-3 Fatty Acids (FISH OIL PO)   Yes No   Sig: Take by mouth   acetaminophen (TYLENOL) 325 mg tablet   Yes No   Sig: Take 650 mg by mouth every 6 (six) hours as needed for mild pain   gabapentin (NEURONTIN) 300 mg capsule   No No   Sig: TAKE 2 CAPSULES(600 MG) BY MOUTH THREE TIMES DAILY   oxybutynin (DITROPAN-XL) 10 MG 24 hr tablet   Yes No   Sig: if needed   rosuvastatin (CRESTOR) 10 MG tablet   No No   Sig: Take 1 tablet (10 mg total) by mouth daily   sildenafil (VIAGRA) 100 mg tablet   Yes No   Sig: Take 100 mg by mouth Once daily as needed   tamsulosin (FLOMAX) 0.4 mg   Yes No   Sig: Take 0.4 mg by mouth      Facility-Administered Medications: None       Past Medical History:   Diagnosis Date    Cancer (HCC)     prostate    Colon polyp     Dermatitis     Resolved 3/31/2016     Dyshidrosis     Resolved 3/31/2016        Past Surgical History:    Procedure Laterality Date    COLONOSCOPY      OK SURGICAL ARTHROSCOPY SHOULDER W/ROTATOR CUFF RPR Left 10/14/2019    Procedure: SHOULDER ARTHROSCOPY, ROTATOR CUFF REPAIR AND SUBACROMIAL DECOMPRESSION;  Surgeon: Ata Williamson MD;  Location: [facility];  Service: Orthopedics    ROTATOR CUFF REPAIR      SKIN BIOPSY      US GUIDED PROSTATE BIOPSY  10/5/2021    VASECTOMY         Family History   Problem Relation Age of Onset    Arthritis Mother     Deep vein thrombosis Mother     Prostate cancer Father     Other Son         Blurring vision. Vision problems due to auto-immune.      No Known Problems Sister     No Known Problems Brother     No Known Problems Maternal Aunt     No Known Problems Maternal Uncle     No Known Problems Paternal Aunt     No Known Problems Paternal Uncle     No Known Problems Maternal Grandmother     No Known Problems Maternal Grandfather     No Known Problems Paternal Grandmother     No Known Problems Paternal Grandfather     Mental illness Neg Hx      I have reviewed and agree with the history as documented.    E-Cigarette/Vaping    E-Cigarette Use Never User      E-Cigarette/Vaping Substances    Nicotine No     THC No     CBD No     Flavoring No     Other No     Unknown No      Social History     Tobacco Use    Smoking status: Never     Passive exposure: Never    Smokeless tobacco: Never   Vaping Use    Vaping status: Never Used   Substance Use Topics    Alcohol use: Never    Drug use: Never        Review of Systems   Constitutional:  Negative for chills and fever.   HENT:  Negative for sore throat.    Eyes:  Negative for pain and visual disturbance.   Respiratory:  Negative for cough and shortness of breath.    Cardiovascular:  Positive for chest pain. Negative for palpitations.   Gastrointestinal:  Negative for abdominal pain and vomiting.   Genitourinary:  Negative for dysuria and hematuria.   Musculoskeletal:  Negative for arthralgias and back pain.   Skin:  Negative for color change  and rash.   Neurological:  Negative for seizures and syncope.   All other systems reviewed and are negative.      Physical Exam  ED Triage Vitals   Temperature Pulse Respirations Blood Pressure SpO2   08/16/24 1246 08/16/24 1242 08/16/24 1242 08/16/24 1242 08/16/24 1242   97.6 °F (36.4 °C) 59 18 118/75 100 %      Temp Source Heart Rate Source Patient Position - Orthostatic VS BP Location FiO2 (%)   08/16/24 1246 08/16/24 1242 08/16/24 1242 08/16/24 1242 --   Oral Monitor Sitting Right arm       Pain Score       08/16/24 1430       7             Orthostatic Vital Signs  Vitals:    08/16/24 1242 08/16/24 1330 08/16/24 1430   BP: 118/75 126/79    Pulse: 59 (!) 54 (!) 53   Patient Position - Orthostatic VS: Sitting Lying        Physical Exam  Vitals and nursing note reviewed.   Constitutional:       General: He is not in acute distress.     Appearance: He is well-developed.   HENT:      Head: Normocephalic and atraumatic.   Eyes:      Conjunctiva/sclera: Conjunctivae normal.   Cardiovascular:      Rate and Rhythm: Normal rate and regular rhythm.      Heart sounds: No murmur heard.  Pulmonary:      Effort: Pulmonary effort is normal. No respiratory distress.      Breath sounds: Normal breath sounds.   Abdominal:      Palpations: Abdomen is soft.      Tenderness: There is no abdominal tenderness.   Musculoskeletal:         General: No swelling.      Cervical back: Neck supple.   Skin:     General: Skin is warm and dry.      Capillary Refill: Capillary refill takes less than 2 seconds.   Neurological:      Mental Status: He is alert.   Psychiatric:         Mood and Affect: Mood normal.         ED Medications  Medications   lidocaine (LIDODERM) 5 % patch 1 patch (1 patch Topical Medication Applied 8/16/24 1407)       Diagnostic Studies  Results Reviewed       Procedure Component Value Units Date/Time    HS Troponin 0hr (reflex protocol) [[id number]]  (Normal) Collected: 08/16/24 1324    Lab Status: Final result Specimen:  Blood from Arm, Right Updated: 08/16/24 1402     hs TnI 0hr <2 ng/L     Comprehensive metabolic panel [[id number]]  (Abnormal) Collected: 08/16/24 1324    Lab Status: Final result Specimen: Blood from Arm, Right Updated: 08/16/24 1355     Sodium 138 mmol/L      Potassium 4.7 mmol/L      Chloride 105 mmol/L      CO2 29 mmol/L      ANION GAP 4 mmol/L      BUN 8 mg/dL      Creatinine 0.85 mg/dL      Glucose 91 mg/dL      Calcium 9.7 mg/dL      AST 20 U/L      ALT 8 U/L      Alkaline Phosphatase 102 U/L      Total Protein 7.7 g/dL      Albumin 4.7 g/dL      Total Bilirubin 1.72 mg/dL      eGFR 95 ml/min/1.73sq m     Narrative:      National Kidney Disease Foundation guidelines for Chronic Kidney Disease (CKD):     Stage 1 with normal or high GFR (GFR > 90 mL/min/1.73 square meters)    Stage 2 Mild CKD (GFR = 60-89 mL/min/1.73 square meters)    Stage 3A Moderate CKD (GFR = 45-59 mL/min/1.73 square meters)    Stage 3B Moderate CKD (GFR = 30-44 mL/min/1.73 square meters)    Stage 4 Severe CKD (GFR = 15-29 mL/min/1.73 square meters)    Stage 5 End Stage CKD (GFR <15 mL/min/1.73 square meters)  Note: GFR calculation is accurate only with a steady state creatinine    CBC and differential [[id number]] Collected: 08/16/24 1324    Lab Status: Final result Specimen: Blood from Arm, Right Updated: 08/16/24 1342     WBC 4.35 Thousand/uL      RBC 4.26 Million/uL      Hemoglobin 12.5 g/dL      Hematocrit 38.6 %      MCV 91 fL      MCH 29.3 pg      MCHC 32.4 g/dL      RDW 13.5 %      MPV 9.8 fL      Platelets 186 Thousands/uL      nRBC 0 /100 WBCs      Segmented % 69 %      Immature Grans % 0 %      Lymphocytes % 24 %      Monocytes % 6 %      Eosinophils Relative 1 %      Basophils Relative 0 %      Absolute Neutrophils 2.96 Thousands/µL      Absolute Immature Grans 0.01 Thousand/uL      Absolute Lymphocytes 1.06 Thousands/µL      Absolute Monocytes 0.28 Thousand/µL      Eosinophils Absolute 0.03 Thousand/µL      Basophils Absolute  0.01 Thousands/µL                    XR chest 1 view portable   ED Interpretation by Kostas Ritter MD (08/16 1432)   No acute cardiopulmonary pathology            Procedures  ECG 12 Lead Documentation Only    Date/Time: 8/16/2024 3:17 PM    Performed by: Kostas Ritter MD  Authorized by: Kostas Ritter MD    Patient location:  ED  Previous ECG:     Previous ECG:  Unavailable  Interpretation:     Interpretation: normal    Rate:     ECG rate assessment: normal    Rhythm:     Rhythm: sinus rhythm    Ectopy:     Ectopy: none    QRS:     QRS axis:  Normal  Conduction:     Conduction: normal    ST segments:     ST segments:  Normal  T waves:     T waves: normal          ED Course  ED Course as of 08/16/24 1527   Fri Aug 16, 2024   1337 58-year-old male with history of stage IV prostate cancer presenting for 1 week of right-sided chest pain.  No known precipitating factor.  Reports pain is on the right side and feels musculoskeletal.  Intermittent, worsens with deep breathing or laying on the right side.  Has not needed any pain\  medication.  Was diagnosed with stage IV prostate cancer this year and has been managing through holistic medicine.  Has not had chemo or radiation therapy for this.  Also has baseline abdominal pain with no acute changes.  No recent fever, headache, shortness of breath, abdominal pain, or other acute symptoms.   1356 Differential at this time includes MSK pain, ACS spectrum, nerve pain, costochondritis.  Low suspicion for PE, infection.   1358 EKG obtained demonstrating sinus bradycardia, otherwise no acute findings.   1423 CBC, CMP within normal limits.  Troponin less than 2.  Low suspicion for ACS at this time.  Provided lidocaine patch for suspected musculoskeletal pain.   1428 Plan to discharge home in stable condition.  Continue applying lidocaine patch for pain as needed.  Follow-up with PCP as needed.  Patient to follow-up with oncologist at [facility].                                        Medical Decision Making  See ED course.    Amount and/or Complexity of Data Reviewed  Labs: ordered.  Radiology: ordered and independent interpretation performed.    Risk  Prescription drug management.          Disposition  Final diagnoses:   Musculoskeletal chest pain     Time reflects when diagnosis was documented in both MDM as applicable and the Disposition within this note       Time User Action Codes Description Comment    8/16/2024  2:33 PM Kostas Ritter Annmarie [R07.89] Musculoskeletal chest pain           ED Disposition       ED Disposition   Discharge    Condition   Stable    Date/Time   Fri Aug 16, 2024  2:33 PM    Comment   Didier Hookshaydee discharge to home/self care.                   Follow-up Information       Follow up With Specialties Details Why Contact Info    Frank Lombardi,  Family Medicine, Wound Care  As needed [address] [place] [address]  [phone]              Discharge Medication List as of 8/16/2024  2:33 PM        CONTINUE these medications which have NOT CHANGED    Details   acetaminophen (TYLENOL) 325 mg tablet Take 650 mg by mouth every 6 (six) hours as needed for mild pain, Historical Med      gabapentin (NEURONTIN) 300 mg capsule TAKE 2 CAPSULES(600 MG) BY MOUTH THREE TIMES DAILY, Normal      Omega-3 Fatty Acids (FISH OIL PO) Take by mouth, Historical Med      oxybutynin (DITROPAN-XL) 10 MG 24 hr tablet if needed, Starting Thu 5/2/2024, Historical Med      rosuvastatin (CRESTOR) 10 MG tablet Take 1 tablet (10 mg total) by mouth daily, Starting Tue 5/14/2024, No Print      sildenafil (VIAGRA) 100 mg tablet Take 100 mg by mouth Once daily as needed, Starting Wed 6/21/2023, Historical Med      tamsulosin (FLOMAX) 0.4 mg Take 0.4 mg by mouth, Starting Mon 1/31/2022, Historical Med           No discharge procedures on file.    PDMP Review       None             ED Provider  Attending physically available and evaluated Didier Hookshaydee. I managed  the patient along with the ED Attending.    Electronically Signed by           Kostas Ritter MD  08/16/24 [id number]

## 2024-08-16 NOTE — TELEPHONE ENCOUNTER
Patient scheduled for Tues 8/20/24 @ 1:45pm to fill out form. Patient also wanted me to let Dr. Lombardi know he was in the e/r today (e/r visit in patient's chart)

## 2024-08-17 LAB
ATRIAL RATE: 52 BPM
P AXIS: 70 DEGREES
PR INTERVAL: 142 MS
QRS AXIS: 19 DEGREES
QRSD INTERVAL: 82 MS
QT INTERVAL: 450 MS
QTC INTERVAL: 418 MS
T WAVE AXIS: 70 DEGREES
VENTRICULAR RATE: 52 BPM

## 2024-08-17 PROCEDURE — 93010 ELECTROCARDIOGRAM REPORT: CPT | Performed by: INTERNAL MEDICINE

## 2024-08-19 NOTE — TELEPHONE ENCOUNTER
Cecilia from [facility] called to check on status of form that needed to be filled out. Will call next week if they have not received it.

## 2024-08-20 ENCOUNTER — OFFICE VISIT (OUTPATIENT)
Dept: FAMILY MEDICINE CLINIC | Facility: CLINIC | Age: 59
End: 2024-08-20
Payer: COMMERCIAL

## 2024-08-20 VITALS
HEIGHT: 68 IN | DIASTOLIC BLOOD PRESSURE: 70 MMHG | OXYGEN SATURATION: 98 % | TEMPERATURE: 97.4 F | SYSTOLIC BLOOD PRESSURE: 122 MMHG | HEART RATE: 64 BPM | WEIGHT: 169 LBS | BODY MASS INDEX: 25.61 KG/M2 | RESPIRATION RATE: 18 BRPM

## 2024-08-20 DIAGNOSIS — R63.4 WEIGHT LOSS: ICD-10-CM

## 2024-08-20 DIAGNOSIS — E78.2 MIXED HYPERLIPIDEMIA: ICD-10-CM

## 2024-08-20 DIAGNOSIS — Z02.71 DISABILITY EXAMINATION: ICD-10-CM

## 2024-08-20 DIAGNOSIS — C61 PROSTATE CANCER (HCC): Primary | ICD-10-CM

## 2024-08-20 DIAGNOSIS — R53.83 OTHER FATIGUE: ICD-10-CM

## 2024-08-20 PROCEDURE — 99214 OFFICE O/P EST MOD 30 MIN: CPT | Performed by: FAMILY MEDICINE

## 2024-08-20 NOTE — ASSESSMENT & PLAN NOTE
Pt had PET scan reviewed results with him  Pt has vast metastasis  Pt has been going to holistic treatment

## 2024-08-20 NOTE — PROGRESS NOTES
"Assessment/Plan:    1. Prostate cancer (HCC)  Assessment & Plan:  Pt had PET scan reviewed results with him  Pt has vast metastasis  Pt has been going to holistic treatment  2. Other fatigue  3. Disability examination  4. Mixed hyperlipidemia  Assessment & Plan:  Pt states he stopped this med  5. Weight loss      Pt states he is enagged in holistic sugar  Pt is not aloowed any sugar in his diet  Stopped all his meds.  PET scan reviewed - w pt.    Forms filled out for the pt.    Pt seen for over 35 minutes  - discusseing his holistic cancer treatment    There are no Patient Instructions on file for this visit.    No follow-ups on file.    Subjective:      Patient ID: Didier Sharif is a 58 y.o. male.    Chief Complaint   Patient presents with    forms      Daughter dropped off disability forms earlier  klpn       T us here at my request to fill out disability paperwork that was sent in that I did not have answerse for    Pt states he has the result of his pet scan on him    Pt states he is not taking any of his prescribed meds        The following portions of the patient's history were reviewed and updated as appropriate: allergies, current medications, past family history, past medical history, past social history, past surgical history and problem list.    Review of Systems   Musculoskeletal:  Positive for myalgias.   Neurological:  Positive for weakness and numbness. Negative for syncope.         No current outpatient medications on file.     No current facility-administered medications for this visit.       Objective:    /70   Pulse 64   Temp (!) 97.4 °F (36.3 °C)   Resp 18   Ht 5' 8.25\" (1.734 m)   Wt 76.7 kg (169 lb)   SpO2 98%   BMI 25.51 kg/m²        Physical Exam  Neurological:      Comments: Decreased reflexes Legs B/L  Global weakness  Gait dysfunction                Frank Lombardi, DO "

## 2024-08-21 NOTE — TELEPHONE ENCOUNTER
Lorena from [facility] called in to follow up on status of form since patient was seen in office yesterday 8/20. Lorena asked if we could fax completed form to [phone].    Please fax, thank you

## 2024-10-02 ENCOUNTER — NURSE TRIAGE (OUTPATIENT)
Age: 59
End: 2024-10-02

## 2024-10-02 NOTE — TELEPHONE ENCOUNTER
Follow up with patient. He did call the treatment company from [place] but they were not much help. Told  him to take apple cider vinegar , 2 tbs. Patient is scheduled for tomorrow . Aware to proceed to the ED if symptoms worsen .

## 2024-10-02 NOTE — TELEPHONE ENCOUNTER
Should be seen but has been going on for two weeks so tomorrow will be fine if we do not have an appt today - pref 30 minute appt.  He may want to contact his holiostioc doctor and ask if what he is taking can have any of this as a side affect as I am generally unfamiliar with holistic prescriptions.

## 2024-10-02 NOTE — TELEPHONE ENCOUNTER
"Please advise for scheduling. Patient wishes to see his PCP and was hoping for an appt today. Warm transfer to clerical , then to clinical without success.   Patient is on holistic medication for stage 4 prostate cancer. Taking Ivermectin and a fermented soy, treatment through a facility in [place] . Taking medication since July 15th, started with penile swelling two weeks ago and now noticing some ankle swelling .   Reason for Disposition   Entire penis is swollen (i.e., edema)    Answer Assessment - Initial Assessment Questions  1. SYMPTOM: \"What's the main symptom you're concerned about?\" (e.g., discharge from penis, rash, pain, itching, swelling)      Penile swelling   2. LOCATION: \"Where is the swelling located?\"      Penis  3. ONSET: \"When did swelling   start?\"      2 weeks  4. PAIN: \"Is there any pain?\" If Yes, ask: \"How bad is it?\"  (Scale 1-10; or mild, moderate, severe)      No pain  5. URINE: \"Any difficulty passing urine?\" If Yes, ask: \"When was the last time?\"      Denies   6. CAUSE: \"What do you think is causing the symptoms?\"      Holistic medication for prostate cancer  7. OTHER SYMPTOMS: \"Do you have any other symptoms?\" (e.g., fever, abdominal pain, blood in urine)      Mild Ankle swelling    Protocols used: Penis and Scrotum Symptoms-ADULT-[place]    "

## 2024-10-03 ENCOUNTER — HOSPITAL ENCOUNTER (OUTPATIENT)
Dept: VASCULAR ULTRASOUND | Facility: HOSPITAL | Age: 59
Discharge: HOME | End: 2024-10-03
Payer: COMMERCIAL

## 2024-10-03 ENCOUNTER — OFFICE VISIT (OUTPATIENT)
Dept: FAMILY MEDICINE CLINIC | Facility: CLINIC | Age: 59
End: 2024-10-03
Payer: COMMERCIAL

## 2024-10-03 VITALS
HEIGHT: 68 IN | WEIGHT: 170 LBS | SYSTOLIC BLOOD PRESSURE: 130 MMHG | DIASTOLIC BLOOD PRESSURE: 76 MMHG | TEMPERATURE: 96.8 F | HEART RATE: 62 BPM | RESPIRATION RATE: 16 BRPM | BODY MASS INDEX: 25.76 KG/M2

## 2024-10-03 DIAGNOSIS — N48.89 EDEMA OF PENIS: ICD-10-CM

## 2024-10-03 DIAGNOSIS — R60.0 BILATERAL LOWER EXTREMITY EDEMA: ICD-10-CM

## 2024-10-03 DIAGNOSIS — C61 PROSTATE CANCER (HCC): Primary | ICD-10-CM

## 2024-10-03 PROCEDURE — 99214 OFFICE O/P EST MOD 30 MIN: CPT | Performed by: FAMILY MEDICINE

## 2024-10-03 PROCEDURE — 93970 EXTREMITY STUDY: CPT

## 2024-10-03 PROCEDURE — 93970 EXTREMITY STUDY: CPT | Performed by: SURGERY

## 2024-10-03 RX ORDER — IVERMECTIN 3 MG/1
TABLET ORAL
Start: 2024-10-03 | End: 2025-10-01

## 2024-10-03 RX ORDER — FUROSEMIDE 20 MG
20 TABLET ORAL DAILY
Qty: 7 TABLET | Refills: 0 | Status: SHIPPED | OUTPATIENT
Start: 2024-10-03 | End: 2024-10-08 | Stop reason: SDUPTHER

## 2024-10-03 RX ORDER — TAMSULOSIN HYDROCHLORIDE 0.4 MG/1
CAPSULE ORAL
COMMUNITY
Start: 2024-08-23

## 2024-10-03 NOTE — PROGRESS NOTES
Assessment/Plan:    1. Prostate cancer (HCC)  -     ivermectin (STROMECTOL) 3 MG TABS; 36mg TID  2. Edema of penis  -     furosemide (LASIX) 20 mg tablet; Take 1 tablet (20 mg total) by mouth daily for 7 days  3. Bilateral lower extremity edema  -      VAS VENOUS DUPLEX - LOWER LIMB BILATERAL; Future; Expected date: 10/03/2024  -     furosemide (LASIX) 20 mg tablet; Take 1 tablet (20 mg total) by mouth daily for 7 days      Pt advised the ivermectin - can cause edema - he is taking very huigh dioses of that from the [facility] for [facility].  Clot is possible but less likely given B/L symptoms but he does have prostate cancer.   Pt advised if he does not have a clot on the doppler he has to decide with the cneter if he is going to cont with the meds - decrease the meds or stop the meds  Good news is he is going to follow up with [facility]    There are no Patient Instructions on file for this visit.    No follow-ups on file.    Subjective:      Patient ID: Didier Sharif is a 58 y.o. male.    Chief Complaint   Patient presents with    Groin Swelling     CMA         Pt called yesterday complaining of penis swelling and ankle edema  Penis swelling 2 weeks - started slow  - no pain.  Pt is able to urinate  No discharge area is not red    Ankle edema - he states this has been a month, sees iot more when he is taking a shower in the evening.     Pt states while he is here he feels sometimes he feels like he has pressure and pain in his left ear - on and off for two weeks    He is taking a holistic treatment for his prostate cancer - Fermented soy and ivermectin.  He has 8 bottles of the 90 of the soy    Pt is asking if I will take over writying his ivermectin    Pt is taking high doses of ivermectin holistically for his prostate cancer -         The following portions of the patient's history were reviewed and updated as appropriate: allergies, current medications, past family history, past medical history, past social  "history, past surgical history and problem list.    Review of Systems   Cardiovascular:  Positive for leg swelling.   Genitourinary:         Penis swelling         Current Outpatient Medications   Medication Sig Dispense Refill    furosemide (LASIX) 20 mg tablet Take 1 tablet (20 mg total) by mouth daily for 7 days 7 tablet 0    ivermectin (STROMECTOL) 3 MG TABS 36mg TID      tamsulosin (FLOMAX) 0.4 mg        No current facility-administered medications for this visit.       Objective:    /76   Pulse 62   Temp (!) 96.8 °F (36 °C) (Tympanic)   Resp 16   Ht 5' 8.25\" (1.734 m)   Wt 77.1 kg (170 lb) Comment: patient reported  BMI 25.66 kg/m²        Physical Exam  Genitourinary:     Comments: Penis swollen  Not red  No discharge  No signs of infection  Musculoskeletal:      Right lower leg: Edema present.      Left lower leg: Edema present.                Frank Lombardi, DO  "

## 2024-10-04 NOTE — RESULT ENCOUNTER NOTE
Please call pt let him know the study was negative for clots and or venous insuf.  Hopefully he will improve with the lasix and he can let us know how he does

## 2024-10-07 DIAGNOSIS — R60.0 BILATERAL LOWER EXTREMITY EDEMA: ICD-10-CM

## 2024-10-07 DIAGNOSIS — N48.89 EDEMA OF PENIS: ICD-10-CM

## 2024-10-08 DIAGNOSIS — N48.89 EDEMA OF PENIS: ICD-10-CM

## 2024-10-08 DIAGNOSIS — R60.0 BILATERAL LOWER EXTREMITY EDEMA: ICD-10-CM

## 2024-10-08 RX ORDER — FUROSEMIDE 20 MG
20 TABLET ORAL DAILY
Qty: 7 TABLET | Refills: 0 | Status: SHIPPED | OUTPATIENT
Start: 2024-10-08 | End: 2024-10-16

## 2024-10-08 RX ORDER — FUROSEMIDE 20 MG
TABLET ORAL
Qty: 7 TABLET | Refills: 0 | OUTPATIENT
Start: 2024-10-08

## 2024-10-08 NOTE — TELEPHONE ENCOUNTER
I am so happy he improved.  If the swelling is gone we should hold off on the lasix at this time.  I will send the refill in case he needs it in the future

## 2024-10-08 NOTE — TELEPHONE ENCOUNTER
Didier said the swelling is gone and will be going for chemo.  He also wanted to let you know you are very caring and he thanks you.    Ene Wiggins MA

## 2024-10-15 DIAGNOSIS — N48.89 EDEMA OF PENIS: ICD-10-CM

## 2024-10-15 DIAGNOSIS — R60.0 BILATERAL LOWER EXTREMITY EDEMA: ICD-10-CM

## 2024-10-16 RX ORDER — FUROSEMIDE 20 MG/1
TABLET ORAL
Qty: 7 TABLET | Refills: 0 | Status: SHIPPED | OUTPATIENT
Start: 2024-10-16 | End: 2024-10-21

## 2024-10-20 DIAGNOSIS — R60.0 BILATERAL LOWER EXTREMITY EDEMA: ICD-10-CM

## 2024-10-20 DIAGNOSIS — N48.89 EDEMA OF PENIS: ICD-10-CM

## 2024-10-21 RX ORDER — FUROSEMIDE 20 MG/1
TABLET ORAL
Qty: 7 TABLET | Refills: 0 | Status: SHIPPED | OUTPATIENT
Start: 2024-10-21

## 2024-12-03 ENCOUNTER — HOSPITAL ENCOUNTER (OUTPATIENT)
Dept: RADIOLOGY | Facility: IMAGING CENTER | Age: 59
Discharge: HOME | End: 2024-12-03

## 2024-12-03 DIAGNOSIS — Z13.820 OSTEOPOROSIS SCREENING: ICD-10-CM

## 2025-04-16 ENCOUNTER — EVALUATION (OUTPATIENT)
Dept: PHYSICAL THERAPY | Facility: CLINIC | Age: 60
End: 2025-04-16
Payer: COMMERCIAL

## 2025-04-16 DIAGNOSIS — M62.81 GENERALIZED MUSCLE WEAKNESS: ICD-10-CM

## 2025-04-16 DIAGNOSIS — C61 PROSTATE CANCER (HCC): Primary | ICD-10-CM

## 2025-04-16 PROCEDURE — 97162 PT EVAL MOD COMPLEX 30 MIN: CPT | Performed by: PHYSICAL THERAPIST

## 2025-04-16 PROCEDURE — 97110 THERAPEUTIC EXERCISES: CPT | Performed by: PHYSICAL THERAPIST

## 2025-04-16 NOTE — LETTER
2025    Dae Lucas MD  [address]  [facility] [address]    Patient: Didier Sharif   YOB: 1965   Date of Visit: 2025     Encounter Diagnosis     ICD-10-CM    1. Prostate cancer (HCC)  C61       2. Generalized muscle weakness  M62.81           Dear Dr. Dae Lucas MD:    Thank you for your recent referral of Didier Sharif. Please review the attached evaluation summary from Didier's recent visit.     Please verify that you agree with the plan of care by signing the attached order.     If you have any questions or concerns, please do not hesitate to call.     I sincerely appreciate the opportunity to share in the care of one of your patients and hope to have another opportunity to work with you in the near future.       Sincerely,    Adolfo Dee, PT      Referring Provider:      I certify that I have read the below Plan of Care and certify the need for these services furnished under this plan of treatment while under my care.                    Dae Lucas MD  [address]  [facility] [address]  Via Fax: [phone]          PT Evaluation     Today's date: 2025  Patient name: Didier Sharif  : 1965  MRN: [id number]  Referring provider: Dae Lucas MD  Dx:   Encounter Diagnosis     ICD-10-CM    1. Prostate cancer (HCC)  C61       2. Generalized muscle weakness  M62.81                      Assessment  Impairments: abnormal or restricted ROM, activity intolerance, impaired physical strength, pain with function and weight-bearing intolerance    Assessment details: The patient presents to PT following several rounds of chemotherapy.  He presents with decreased BLE strength, impaired gait, and pain with muscle function and this is limiting his ability to complete ADLs and job activities.  His BLE strength is quite limited so this will be the first thing we focus on as we begin PT.  As this improves we will then look to improve his stability and improve his level  of function.   The patient would benefit from PT to help improve his strength, decrease pain and improve his level of function.    Understanding of Dx/Px/POC: excellent     Prognosis: excellent    Goals  STG : (2 weeks) - Patient demonstrates independence with HEP to be able to transition to HEP in the long term.  (4 weeks) - Decrease lateral sway during gait to better improve dynamic balance..      LTG: (6 weeks) - Improve TUG time to less than 12 seconds to help demonstrate improved gait and stability.  (8 weeks) - Improve BLE strength to 4/5 to help improve his ability to walk long distances.  (8 weeks) - Improve 5x sit to stand to less than 15 seconds to demonstrate improvements in strength and level of function      Plan  Patient would benefit from: skilled physical therapy  Planned modality interventions: TENS, cryotherapy and thermotherapy: hydrocollator packs    Planned therapy interventions: therapeutic exercise, therapeutic activities, gait training, manual therapy and neuromuscular re-education    Frequency: 2x week  Duration in weeks: 8    Subjective Evaluation    History of Present Illness  Mechanism of injury: The patient reports that in October of 2021 the patient was first diagnosed with prostate cancer.  He had radiation in 2022 but it came back as stage 4 in 2024.  Recently he had 6 sessions of chemo.  This has caused pain and weakness in BLE.  His pain in his both of his calves and his hamstrings.  He also can get pain in his knee joints when he straightens out his legs.  He states that when chemo first started it would take a little while to recover but then he would go into his basement and use the treadmill.  After a few sessions he was no longer able to go to his basement and use his treadmill.  He has still been doing some short walks in his neighborhood.  The patient also has a lot of issues with walking up and down the stairs.  He reports that his balance is doing pretty good but he is  using a cane just to make sure.    Patient Goals  Patient goal: The patient wants to get back to walking his neighborhood and walk 3 miles and get back to walking on the treadmill.  He wants to cut back his pain and be more functional.  Pain  Current pain ratin  At worst pain ratin  Location: BLE pain        Objective     Strength/Myotome Testing     Left Hip   Planes of Motion   Flexion: 3+  Abduction: 3+  External rotation: 3+    Right Hip   Planes of Motion   Flexion: 3+  Abduction: 3+  External rotation: 3    Left Knee   Flexion: 3+  Extension: 4-    Right Knee   Flexion: 3  Extension: 4-    Left Ankle/Foot   Dorsiflexion: 3+  Plantar flexion: 3+    Right Ankle/Foot   Dorsiflexion: 3+  Plantar flexion: 3+    Additional Strength Details  Pain with resisted knee flexion    Ambulation     Ambulation: Level Surfaces     Additional Level Surfaces Ambulation Details  Lateral sway during gait and decreased gait speed.    Functional Assessment        Comments  TUG - 12 sec     5x sit to stand - 27 sec              Precautions:      POC Expires Auth Status Start Date Expiration Date PT Visit Limit           Date        Used 1       Remaining           Diagnosis:    Precautions:    Manuals        TCJ mobs                                        There Ex        Ankle Pumps 2x10       Standing HS curls 2x10       HS ST        Standing hip ABD        Standing hip ext        Seated march                                Neuro Re-Ed                                                                                                                 Ther Act                                         Modalities

## 2025-04-16 NOTE — PROGRESS NOTES
PT Evaluation     Today's date: 2025  Patient name: Didier Sharif  : 1965  MRN: [id number]  Referring provider: Dae Lucas MD  Dx:   Encounter Diagnosis     ICD-10-CM    1. Prostate cancer (HCC)  C61       2. Generalized muscle weakness  M62.81                      Assessment  Impairments: abnormal or restricted ROM, activity intolerance, impaired physical strength, pain with function and weight-bearing intolerance    Assessment details: The patient presents to PT following several rounds of chemotherapy.  He presents with decreased BLE strength, impaired gait, and pain with muscle function and this is limiting his ability to complete ADLs and job activities.  His BLE strength is quite limited so this will be the first thing we focus on as we begin PT.  As this improves we will then look to improve his stability and improve his level of function.   The patient would benefit from PT to help improve his strength, decrease pain and improve his level of function.    Understanding of Dx/Px/POC: excellent     Prognosis: excellent    Goals  STG : (2 weeks) - Patient demonstrates independence with HEP to be able to transition to HEP in the long term.  (4 weeks) - Decrease lateral sway during gait to better improve dynamic balance..      LTG: (6 weeks) - Improve TUG time to less than 12 seconds to help demonstrate improved gait and stability.  (8 weeks) - Improve BLE strength to 4/5 to help improve his ability to walk long distances.  (8 weeks) - Improve 5x sit to stand to less than 15 seconds to demonstrate improvements in strength and level of function      Plan  Patient would benefit from: skilled physical therapy  Planned modality interventions: TENS, cryotherapy and thermotherapy: hydrocollator packs    Planned therapy interventions: therapeutic exercise, therapeutic activities, gait training, manual therapy and neuromuscular re-education    Frequency: 2x week  Duration in weeks: 8    Subjective  Evaluation    History of Present Illness  Mechanism of injury: The patient reports that in 2021 the patient was first diagnosed with prostate cancer.  He had radiation in  but it came back as stage 4 in .  Recently he had 6 sessions of chemo.  This has caused pain and weakness in BLE.  His pain in his both of his calves and his hamstrings.  He also can get pain in his knee joints when he straightens out his legs.  He states that when chemo first started it would take a little while to recover but then he would go into his basement and use the treadmill.  After a few sessions he was no longer able to go to his basement and use his treadmill.  He has still been doing some short walks in his neighborhood.  The patient also has a lot of issues with walking up and down the stairs.  He reports that his balance is doing pretty good but he is using a cane just to make sure.    Patient Goals  Patient goal: The patient wants to get back to walking his neighborhood and walk 3 miles and get back to walking on the treadmill.  He wants to cut back his pain and be more functional.  Pain  Current pain ratin  At worst pain ratin  Location: BLE pain        Objective     Strength/Myotome Testing     Left Hip   Planes of Motion   Flexion: 3+  Abduction: 3+  External rotation: 3+    Right Hip   Planes of Motion   Flexion: 3+  Abduction: 3+  External rotation: 3    Left Knee   Flexion: 3+  Extension: 4-    Right Knee   Flexion: 3  Extension: 4-    Left Ankle/Foot   Dorsiflexion: 3+  Plantar flexion: 3+    Right Ankle/Foot   Dorsiflexion: 3+  Plantar flexion: 3+    Additional Strength Details  Pain with resisted knee flexion    Ambulation     Ambulation: Level Surfaces     Additional Level Surfaces Ambulation Details  Lateral sway during gait and decreased gait speed.    Functional Assessment        Comments  TUG - 12 sec     5x sit to stand - 27 sec              Precautions:      POC Expires Auth Status Start  Date Expiration Date PT Visit Limit           Date 4/16       Used 1       Remaining           Diagnosis:    Precautions:    Manuals 4/16       TCJ mobs                                        There Ex        Ankle Pumps 2x10       Standing HS curls 2x10       HS ST        Standing hip ABD        Standing hip ext        Seated march                                Neuro Re-Ed                                                                                                                 Ther Act                                         Modalities

## 2025-04-18 ENCOUNTER — OFFICE VISIT (OUTPATIENT)
Dept: PHYSICAL THERAPY | Facility: CLINIC | Age: 60
End: 2025-04-18
Payer: COMMERCIAL

## 2025-04-18 DIAGNOSIS — C61 PROSTATE CANCER (HCC): Primary | ICD-10-CM

## 2025-04-18 DIAGNOSIS — M62.81 GENERALIZED MUSCLE WEAKNESS: ICD-10-CM

## 2025-04-18 PROCEDURE — 97110 THERAPEUTIC EXERCISES: CPT | Performed by: PHYSICAL THERAPIST

## 2025-04-18 NOTE — PROGRESS NOTES
"Daily Note     Today's date: 2025  Patient name: Didier Sharif  : 1965  MRN: [id number]  Referring provider: Dae Lucas MD  Dx:   Encounter Diagnosis     ICD-10-CM    1. Prostate cancer (HCC)  C61       2. Generalized muscle weakness  M62.81                      Subjective: The patient reports that his hamstrings are a little sore today and he had some trouble sleeping last night.      Objective: See treatment diary below      Assessment: Tolerated treatment well. Patient demonstrated fatigue post treatment and would benefit from continued PT    The patient did fairly well for his first treatment session.  I made sure to give him breaks in between his exercises but he seemed to tolerate them well.  I can tell that the patient is motivated as he pushed himself during the session.    Plan: Continue per plan of care.      Precautions:      POC Expires Auth Status Start Date Expiration Date PT Visit Limit           Date       Used 1 2      Remaining           Diagnosis:    Precautions:    Manuals       TCJ mobs                                        There Ex        Ankle Pumps 2x10 2x10      Standing HS curls 2x10 2x10      LTR  x15ea      SLR  x10ea      HS ST  4x15\"      Standing hip ABD  x10ea      Standing hip ext        Seated march  2x10      LAQ  4# x10, x5 ea      NuStep for muscle endurance  5'                      Neuro Re-Ed        Bridge  2x10                                                                                                       Ther Act                                         Modalities                                                              "

## 2025-04-21 ENCOUNTER — OFFICE VISIT (OUTPATIENT)
Dept: PHYSICAL THERAPY | Facility: CLINIC | Age: 60
End: 2025-04-21
Attending: INTERNAL MEDICINE
Payer: COMMERCIAL

## 2025-04-21 DIAGNOSIS — C61 PROSTATE CANCER (HCC): Primary | ICD-10-CM

## 2025-04-21 DIAGNOSIS — M62.81 GENERALIZED MUSCLE WEAKNESS: ICD-10-CM

## 2025-04-21 PROCEDURE — 97110 THERAPEUTIC EXERCISES: CPT | Performed by: PHYSICAL THERAPIST

## 2025-04-21 NOTE — PROGRESS NOTES
"Daily Note     Today's date: 2025  Patient name: Didier Sharif  : 1965  MRN: [id number]  Referring provider: Dae Lucas MD  Dx:   Encounter Diagnosis     ICD-10-CM    1. Prostate cancer (HCC)  C61       2. Generalized muscle weakness  M62.81                      Subjective: The patient reports that he is already feeling a little better.  He was able to walk 2.5 miles the one day over the weekend and was only a little sore the next day.      Objective: See treatment diary below      Assessment: Tolerated treatment well. Patient demonstrated fatigue post treatment and would benefit from continued PT    The patient did a good job of tolerating his first treatment session so I added three additional exercises today.  I was impressed that he was able to do standing calf raises without having cramping pain in his calves.  His sit to stands and standing hip extensions looked great and he had good muscle activation.      Plan: Continue per plan of care.      Precautions:      POC Expires Auth Status Start Date Expiration Date PT Visit Limit           Date      Used 1 2      Remaining           Diagnosis:    Precautions:    Manuals      TCJ mobs                                        There Ex        Ankle Pumps 2x10 2x10 2x10     Calf raises    2x10     Standing HS curls 2x10 2x10 2x10     LTR  x15ea x20ea     SLR  x10ea x10ea     HS ST  4x15\" 4x15\"     Standing hip ABD  x10ea x10ea     Standing hip ext   x10ea     Seated march  2x10      LAQ  4# x10, x5 ea 4# x10, x5 ea     NuStep for muscle endurance  5' 5'                     Neuro Re-Ed        Bridge  2x10 2x10                                                                                                      Ther Act              sit to stand      2x10                     Modalities                                                                "

## 2025-04-23 ENCOUNTER — OFFICE VISIT (OUTPATIENT)
Dept: PHYSICAL THERAPY | Facility: CLINIC | Age: 60
End: 2025-04-23
Attending: INTERNAL MEDICINE
Payer: COMMERCIAL

## 2025-04-23 DIAGNOSIS — C61 PROSTATE CANCER (HCC): Primary | ICD-10-CM

## 2025-04-23 DIAGNOSIS — M62.81 GENERALIZED MUSCLE WEAKNESS: ICD-10-CM

## 2025-04-23 PROCEDURE — 97110 THERAPEUTIC EXERCISES: CPT | Performed by: PHYSICAL THERAPIST

## 2025-04-23 PROCEDURE — 97530 THERAPEUTIC ACTIVITIES: CPT | Performed by: PHYSICAL THERAPIST

## 2025-04-28 ENCOUNTER — OFFICE VISIT (OUTPATIENT)
Dept: PHYSICAL THERAPY | Facility: CLINIC | Age: 60
End: 2025-04-28
Attending: INTERNAL MEDICINE
Payer: COMMERCIAL

## 2025-04-28 DIAGNOSIS — M62.81 GENERALIZED MUSCLE WEAKNESS: ICD-10-CM

## 2025-04-28 DIAGNOSIS — C61 PROSTATE CANCER (HCC): Primary | ICD-10-CM

## 2025-04-28 PROCEDURE — 97530 THERAPEUTIC ACTIVITIES: CPT | Performed by: PHYSICAL THERAPIST

## 2025-04-28 PROCEDURE — 97110 THERAPEUTIC EXERCISES: CPT | Performed by: PHYSICAL THERAPIST

## 2025-04-28 NOTE — PROGRESS NOTES
"Daily Note     Today's date: 2025  Patient name: Didier Sharif  : 1965  MRN: [id number]  Referring provider: Dae Lucas MD  Dx:   Encounter Diagnosis     ICD-10-CM    1. Prostate cancer (HCC)  C61       2. Generalized muscle weakness  M62.81                      Subjective: The patient reports that he is feeling pretty good.      Objective: See treatment diary below      Assessment: Tolerated treatment well. Patient demonstrated fatigue post treatment and would benefit from continued PT    The patient is feeling better so I progressed him from using the NuStep to using the bike to work on his muscle endurance.  S/L clams were added in today and he demonstrated good muscle activation with this.  We have been steadily pushing the patient more and I can tell he is tired by the end of the session.    Plan: Continue per plan of care.      Precautions:      POC Expires Auth Status Start Date Expiration Date PT Visit Limit           Date    Used 1 2 3 4 5   Remaining           Diagnosis:    Precautions:    Manuals    TCJ mobs                                        There Ex        Ankle Pumps 2x10 2x10 2x10     Calf raises    2x10 2x10 2x10   Standing HS curls 2x10 2x10 2x10 2x10 2x10   LTR  x15ea x20ea x15ea x15ea   SLR  x10ea x10ea 2x10ea 2x10ea   HS ST  4x15\" 4x15\" 4x15\"    Standing hip ABD  x10ea x10ea x10ea x10ea   Standing hip ext   x10ea x10ea x10ea   Seated march  2x10      LAQ  4# x10, x5 ea 4# x10, x5 ea 4# x10, x5    NuStep for muscle endurance  5' 5' 5' Bike 5' resistance L1   S/L clams     GTB 2x10ea           Neuro Re-Ed        Bridge  2x10 2x10 2x10 2x10                                                                                                    Ther Act              sit to stand      2x10  2x10  2x10   Squat machine       70# x30  70# x30   Modalities                                                                    "

## 2025-04-30 ENCOUNTER — OFFICE VISIT (OUTPATIENT)
Dept: PHYSICAL THERAPY | Facility: CLINIC | Age: 60
End: 2025-04-30
Attending: INTERNAL MEDICINE
Payer: COMMERCIAL

## 2025-04-30 DIAGNOSIS — C61 PROSTATE CANCER (HCC): Primary | ICD-10-CM

## 2025-04-30 DIAGNOSIS — M62.81 GENERALIZED MUSCLE WEAKNESS: ICD-10-CM

## 2025-04-30 PROCEDURE — 97110 THERAPEUTIC EXERCISES: CPT

## 2025-04-30 PROCEDURE — 97530 THERAPEUTIC ACTIVITIES: CPT

## 2025-04-30 NOTE — PROGRESS NOTES
"Daily Note     Today's date: 2025  Patient name: Didier Sharif  : 1965  MRN: [id number]  Referring provider: Dae Lucas MD  Dx:   Encounter Diagnosis     ICD-10-CM    1. Prostate cancer (HCC)  C61       2. Generalized muscle weakness  M62.81             Start Time: 1045  Stop Time: 1130  Total time in clinic (min): 45 minutes      Subjective: Patient reports no new complaints or major changes and notes no significant soreness after progressions last session.       Objective: See treatment diary below      Assessment: Tolerated treatment well. Cont with current POC focused on B LE strengthening and functional exercises with good tolerance and no adverse response noted t/o despite moderate increases in muscular fatigue. Rest breaks required between exercises and sets of exercises due to moderate muscular fatigue, but no signs of overexertion or onset of pain t/o session. No adverse response to recent progressions of warm up on recumbent bike and clamshells during last session. Cont to progress as able. Patient demonstrated fatigue post treatment and would benefit from continued PT      Plan: Continue per plan of care.      Precautions:      POC Expires Auth Status Start Date Expiration Date PT Visit Limit           Date    Used 6 2 3 4 5   Remaining           Diagnosis:    Precautions:    Manuals    TCJ mobs                                        There Ex        Ankle Pumps  2x10 2x10     Calf raises  2x10  2x10 2x10 2x10   Standing HS curls 2x10 2x10 2x10 2x10 2x10   LTR x15ea x15ea x20ea x15ea x15ea   SLR 2x10ea x10ea x10ea 2x10ea 2x10ea   HS ST 4x15\" 4x15\" 4x15\" 4x15\"    Standing hip ABD  x10ea x10ea x10ea x10ea   Standing hip ext   x10ea x10ea x10ea   Seated march  2x10      LAQ 4# x10, x5 ea 4# x10, x5 ea 4# x10, x5 ea 4# x10, x5    NuStep for muscle endurance Bike 5' resistance L1 5' 5' 5' Bike 5' resistance L1   S/L clams GTB 2x10 ea    GTB 2x10ea      "      Neuro Re-Ed        Bridge 2x10 2x10 2x10 2x10 2x10                                                                                                    Ther Act              sit to stand      2x10  2x10  2x10   Squat machine  70# x30     70# x30  70# x30   Modalities

## 2025-05-06 ENCOUNTER — APPOINTMENT (OUTPATIENT)
Dept: PHYSICAL THERAPY | Facility: CLINIC | Age: 60
End: 2025-05-06
Attending: INTERNAL MEDICINE
Payer: COMMERCIAL

## 2025-05-09 ENCOUNTER — APPOINTMENT (OUTPATIENT)
Dept: PHYSICAL THERAPY | Facility: CLINIC | Age: 60
End: 2025-05-09
Attending: INTERNAL MEDICINE
Payer: COMMERCIAL

## 2025-05-12 ENCOUNTER — OFFICE VISIT (OUTPATIENT)
Dept: PHYSICAL THERAPY | Facility: CLINIC | Age: 60
End: 2025-05-12
Attending: INTERNAL MEDICINE
Payer: COMMERCIAL

## 2025-05-12 DIAGNOSIS — C61 PROSTATE CANCER (HCC): Primary | ICD-10-CM

## 2025-05-12 DIAGNOSIS — M62.81 GENERALIZED MUSCLE WEAKNESS: ICD-10-CM

## 2025-05-12 PROCEDURE — 97112 NEUROMUSCULAR REEDUCATION: CPT | Performed by: PHYSICAL THERAPIST

## 2025-05-12 PROCEDURE — 97110 THERAPEUTIC EXERCISES: CPT

## 2025-05-12 NOTE — PROGRESS NOTES
"Daily Note     Today's date: 2025  Patient name: Didier Sharif  : 1965  MRN: [id number]  Referring provider: Dae Lucas MD  Dx:   Encounter Diagnosis     ICD-10-CM    1. Prostate cancer (HCC)  C61       2. Generalized muscle weakness  M62.81           Start Time: 1140  Stop Time: 1226  Total time in clinic (min): 46 minutes      Subjective: Patient reports that he has been dealing with various doctor appts and tests since his white blood cell count was lower than usual and has an appt with hematology this week. Patient otherwise offers no new complaints or changes since last session.      Objective: See treatment diary below      Assessment: Tolerated treatment well. Cont with B LE strengthening POC with good tolerance and no adverse response noted t/o despite moderate increases in muscular fatigue. Improvements in tolerance to current exercise program in which patient required fewer rest breaks between exercises and sets of exercises as compared to previous session. Some verbal cueing required for proper form/recall of supine exercise, with good carryover noted. No sgins of overexertion t/o session. Cont to progress as able. Patient demonstrated fatigue post treatment and would benefit from continued PT      Plan: Continue per plan of care.      Precautions:      POC Expires Auth Status Start Date Expiration Date PT Visit Limit           Date    Used 6 7 3 4 5   Remaining           Diagnosis:    Precautions:    Manuals    TCJ mobs                                        There Ex        Ankle Pumps   2x10     Calf raises  2x10 2x10 2x10 2x10 2x10   Standing HS curls 2x10 2x10 2x10 2x10 2x10   LTR x15ea x15ea x20ea x15ea x15ea   SLR 2x10ea 2x10ea x10ea 2x10ea 2x10ea   HS ST 4x15\" 4x15\"ea 4x15\" 4x15\"    Standing hip ABD  x10ea x10ea x10ea x10ea   Standing hip ext   x10ea x10ea x10ea   Seated march        LAQ 4# x10, x5 ea 4# x10, x5ea 4# x10, x5 ea 4# x10, x5  "   NuStep for muscle endurance Bike 5' resistance L1 Bike 5' resistance L1 5' 5' Bike 5' resistance L1   S/L clams GTB 2x10 ea GTB 2x10 ea   GTB 2x10ea           Neuro Re-Ed        Bridge 2x10 2x10 2x10 2x10 2x10                                                                                                    Ther Act              sit to stand    2x10  2x10  2x10  2x10   Squat machine  70# x30  70# 30x   70# x30  70# x30   Modalities

## 2025-05-14 ENCOUNTER — EVALUATION (OUTPATIENT)
Dept: PHYSICAL THERAPY | Facility: CLINIC | Age: 60
End: 2025-05-14
Attending: INTERNAL MEDICINE
Payer: COMMERCIAL

## 2025-05-14 DIAGNOSIS — M62.81 GENERALIZED MUSCLE WEAKNESS: Primary | ICD-10-CM

## 2025-05-14 PROCEDURE — 97110 THERAPEUTIC EXERCISES: CPT | Performed by: PHYSICAL THERAPIST

## 2025-05-14 PROCEDURE — 97530 THERAPEUTIC ACTIVITIES: CPT | Performed by: PHYSICAL THERAPIST

## 2025-05-14 NOTE — PROGRESS NOTES
PT Discharge    Today's date: 2025  Patient name: Didier Sharif  : 1965  MRN: [id number]  Referring provider: Dae Lucas MD  Dx:   Encounter Diagnosis     ICD-10-CM    1. Generalized muscle weakness  M62.81                        Assessment  Impairments: abnormal or restricted ROM, activity intolerance, impaired physical strength, pain with function and weight-bearing intolerance    Assessment details: The patient has made great progress since the start of PT.  His strength is significantly better and this is allowing him to complete more functional activities with less fatigue.  His gait is much improved and this can be seen during his TUG test.  His improvements in strength can also be seen as his 5x sit to stand went from 27 seconds to 11 seconds.  Overall the patient is doing much better so me and the patient both agree that he is ready to be discharged.      Understanding of Dx/Px/POC: excellent     Prognosis: excellent    Goals  STG : (2 weeks) - Patient demonstrates independence with HEP to be able to transition to HEP in the long term.  - 100%  (4 weeks) - Decrease lateral sway during gait to better improve dynamic balance.  - 90%    LTG: (6 weeks) - Improve TUG time to less than 12 seconds to help demonstrate improved gait and stability.  - 100%  (8 weeks) - Improve BLE strength to 4/5 to help improve his ability to walk long distances.  - 65%  (8 weeks) - Improve 5x sit to stand to less than 15 seconds to demonstrate improvements in strength and level of function.  - 100%      Plan  Patient would benefit from: skilled physical therapy  Planned modality interventions: TENS, cryotherapy and thermotherapy: hydrocollator packs    Planned therapy interventions: therapeutic exercise, therapeutic activities, gait training, manual therapy and neuromuscular re-education    Plan details: Discharge    Subjective Evaluation    History of Present Illness  Mechanism of injury: The patient reports that in  2021 the patient was first diagnosed with prostate cancer.  He had radiation in  but it came back as stage 4 in .  Recently he had 6 sessions of chemo.  This has caused pain and weakness in BLE.  His pain in his both of his calves and his hamstrings.  He also can get pain in his knee joints when he straightens out his legs.  He states that when chemo first started it would take a little while to recover but then he would go into his basement and use the treadmill.  After a few sessions he was no longer able to go to his basement and use his treadmill.  He has still been doing some short walks in his neighborhood.  The patient also has a lot of issues with walking up and down the stairs.  He reports that his balance is doing pretty good but he is using a cane just to make sure.      Discharge - The patient reports that PT has helped a lot.  The patient states that his leg pain is doing much better and he is no longer getting the spasms in his calves and hamstrings.  He does not rely on his cane but he does keep it with him just in case he loses his balance.  He still has a feeling of fatigue but he is very active.    Patient Goals  Patient goal: The patient wants to get back to walking his neighborhood and walk 3 miles and get back to walking on the treadmill.  He wants to cut back his pain and be more functional.  Pain  Current pain ratin  At worst pain ratin  Location: BLE pain        Objective     Strength/Myotome Testing     Left Hip   Planes of Motion   Flexion: 4-  Abduction: 4-  External rotation: 4-    Right Hip   Planes of Motion   Flexion: 4-  Abduction: 4  External rotation: 3+    Left Knee   Flexion: 4-  Extension: 4    Right Knee   Flexion: 4  Extension: 4    Left Ankle/Foot   Dorsiflexion: 4  Plantar flexion: 4    Right Ankle/Foot   Dorsiflexion: 4  Plantar flexion: 4    Ambulation     Ambulation: Level Surfaces     Additional Level Surfaces Ambulation Details  Lateral sway  "during gait and decreased gait speed.    Functional Assessment        Comments  TUG - 6 sec     5x sit to stand - 11 sec              Precautions:      POC Expires Auth Status Start Date Expiration Date PT Visit Limit           Date 4/30 5/12 5/14 4/23 4/28   Used 6 7 8 4 5   Remaining           Diagnosis:    Precautions:    Manuals 4/30 5/12 5/14 4/23 4/28   TCJ mobs                                        There Ex        Ankle Pumps        Calf raises  2x10 2x10 2x10 2x10 2x10   Standing HS curls 2x10 2x10  2x10 2x10   LTR x15ea x15ea x20ea x15ea x15ea   SLR 2x10ea 2x10ea x10ea 2x10ea 2x10ea   HS ST 4x15\" 4x15\"ea  4x15\"    Standing hip ABD  x10ea  x10ea x10ea   Standing hip ext    x10ea x10ea   Seated march        LAQ 4# x10, x5 ea 4# x10, x5ea  4# x10, x5    NuStep for muscle endurance Bike 5' resistance L1 Bike 5' resistance L1 Bike 5' 5' Bike 5' resistance L1   S/L clams GTB 2x10 ea GTB 2x10 ea   GTB 2x10ea           Neuro Re-Ed        Bridge 2x10 2x10 2x10 2x10 2x10                                                                                                    Ther Act              sit to stand    2x10  2x10  2x10  2x10   Squat machine  70# x30  70# 30x   70# x30  70# x30   Modalities                                                              "

## (undated) DEVICE — VAPR COOLPULSE 90 ELECTRODE WITH HAND CONTROLS 90 DEGREES SUCTION WITH INTEGRATED HANDPIECE: Brand: VAPR COOLPULSE

## (undated) DEVICE — POSITIONER TRIMANO LIMB BEACH CHAIR

## (undated) DEVICE — SPONGE GAUZE 4 X 9

## (undated) DEVICE — GLOVE SRG BIOGEL 7.5

## (undated) DEVICE — CANNULA 5.75 X 70MM BARREL SHAPED BOWL

## (undated) DEVICE — NEEDLE SUT SCORPION MULTIFIRE

## (undated) DEVICE — BLADE SHAVER EXCALIBUR 4MM 13CM COOLCUT

## (undated) DEVICE — GLOVE INDICATOR PI UNDERGLOVE SZ 6.5 BLUE

## (undated) DEVICE — DUAL SPIKE ADAPTER: Brand: CONMED

## (undated) DEVICE — INTENDED FOR TISSUE SEPARATION, AND OTHER PROCEDURES THAT REQUIRE A SHARP SURGICAL BLADE TO PUNCTURE OR CUT.: Brand: BARD-PARKER SAFETY BLADES SIZE 11, STERILE

## (undated) DEVICE — ASTOUND IMPERVIOUS SURGICAL GOWN: Brand: CONVERTORS

## (undated) DEVICE — SUT FIBERLINK #2 26IN AR-7235

## (undated) DEVICE — TUBING ARTHROSCOPIC WAVE  MAIN PUMP

## (undated) DEVICE — 3M™ TEGADERM™ TRANSPARENT FILM DRESSING FRAME STYLE, 1624W, 2-3/8 IN X 2-3/4 IN (6 CM X 7 CM), 100/CT 4CT/CASE: Brand: 3M™ TEGADERM™

## (undated) DEVICE — CHLORAPREP HI-LITE 26ML ORANGE

## (undated) DEVICE — GLOVE SRG BIOGEL 6.5

## (undated) DEVICE — GLOVE INDICATOR PI UNDERGLOVE SZ 7.5 BLUE

## (undated) DEVICE — Device

## (undated) DEVICE — BLADE SHAVER TORPEDO 4MM 13CM  COOLCUT

## (undated) DEVICE — TUBING SUCTION 5MM X 12 FT

## (undated) DEVICE — BURR  OVAL 4MM 13CM 8 FLUTE COOLCUT

## (undated) DEVICE — TIBURON BEACH CHAIR SHOULDER DRAPE: Brand: CONVERTORS

## (undated) DEVICE — SUT ETHILON 4-0 PS-2 18 IN 1667G

## (undated) DEVICE — 3M™ TEGADERM™ TRANSPARENT FILM DRESSING FRAME STYLE, 1626W, 4 IN X 4-3/4 IN (10 CM X 12 CM), 50/CT 4CT/CASE: Brand: 3M™ TEGADERM™

## (undated) DEVICE — PACK ARTHROSCOPY

## (undated) DEVICE — FIBERTAPE 2MM X 7IN AR-7237-7

## (undated) DEVICE — CANNULA BUTTON 8 X 30MM PASSPORT

## (undated) DEVICE — OCCLUSIVE GAUZE STRIP,3% BISMUTH TRIBROMOPHENATE IN PETROLATUM BLEND: Brand: XEROFORM